# Patient Record
Sex: FEMALE | Race: WHITE | NOT HISPANIC OR LATINO | Employment: FULL TIME | URBAN - METROPOLITAN AREA
[De-identification: names, ages, dates, MRNs, and addresses within clinical notes are randomized per-mention and may not be internally consistent; named-entity substitution may affect disease eponyms.]

---

## 2023-10-09 ENCOUNTER — OFFICE VISIT (OUTPATIENT)
Dept: FAMILY MEDICINE CLINIC | Facility: CLINIC | Age: 53
End: 2023-10-09
Payer: COMMERCIAL

## 2023-10-09 VITALS
HEIGHT: 66 IN | DIASTOLIC BLOOD PRESSURE: 82 MMHG | OXYGEN SATURATION: 98 % | WEIGHT: 136 LBS | BODY MASS INDEX: 21.86 KG/M2 | SYSTOLIC BLOOD PRESSURE: 146 MMHG | RESPIRATION RATE: 14 BRPM | HEART RATE: 78 BPM

## 2023-10-09 DIAGNOSIS — Z00.00 PHYSICAL EXAM, ANNUAL: ICD-10-CM

## 2023-10-09 DIAGNOSIS — F32.A ANXIETY AND DEPRESSION: Chronic | ICD-10-CM

## 2023-10-09 DIAGNOSIS — F17.210 CIGARETTE SMOKER: ICD-10-CM

## 2023-10-09 DIAGNOSIS — G43.809 OTHER MIGRAINE WITHOUT STATUS MIGRAINOSUS, NOT INTRACTABLE: ICD-10-CM

## 2023-10-09 DIAGNOSIS — F44.5 PSYCHOGENIC NONEPILEPTIC SEIZURE: ICD-10-CM

## 2023-10-09 DIAGNOSIS — R07.9 CHEST PAIN, UNSPECIFIED TYPE: ICD-10-CM

## 2023-10-09 DIAGNOSIS — Z12.2 ENCOUNTER FOR SCREENING FOR LUNG CANCER: ICD-10-CM

## 2023-10-09 DIAGNOSIS — Z00.00 MEDICARE ANNUAL WELLNESS VISIT, SUBSEQUENT: Primary | ICD-10-CM

## 2023-10-09 DIAGNOSIS — K06.1 GINGIVAL HYPERPLASIA: ICD-10-CM

## 2023-10-09 DIAGNOSIS — Z72.0 TOBACCO USE: ICD-10-CM

## 2023-10-09 DIAGNOSIS — R41.89 COGNITIVE DECLINE: ICD-10-CM

## 2023-10-09 DIAGNOSIS — I10 ESSENTIAL HYPERTENSION: ICD-10-CM

## 2023-10-09 DIAGNOSIS — R87.810 CERVICAL HIGH RISK HPV (HUMAN PAPILLOMAVIRUS) TEST POSITIVE: ICD-10-CM

## 2023-10-09 DIAGNOSIS — J44.1 CHRONIC OBSTRUCTIVE PULMONARY DISEASE WITH ACUTE EXACERBATION: ICD-10-CM

## 2023-10-09 DIAGNOSIS — F43.10 PTSD (POST-TRAUMATIC STRESS DISORDER): ICD-10-CM

## 2023-10-09 DIAGNOSIS — Z12.31 ENCOUNTER FOR SCREENING MAMMOGRAM FOR MALIGNANT NEOPLASM OF BREAST: ICD-10-CM

## 2023-10-09 DIAGNOSIS — F41.9 ANXIETY AND DEPRESSION: Chronic | ICD-10-CM

## 2023-10-09 RX ORDER — CARVEDILOL 12.5 MG/1
12.5 TABLET ORAL 2 TIMES DAILY WITH MEALS
Qty: 180 TABLET | Refills: 1 | Status: SHIPPED | OUTPATIENT
Start: 2023-10-09

## 2023-10-09 RX ORDER — AMLODIPINE BESYLATE 5 MG/1
5 TABLET ORAL DAILY
Qty: 30 TABLET | Refills: 1 | Status: SHIPPED | OUTPATIENT
Start: 2023-10-09

## 2023-10-09 NOTE — PROGRESS NOTES
The ABCs of the Annual Wellness Visit  Subsequent Medicare Wellness Visit    Subjective    Arcelia Quintero is a 53 y.o. female who presents for a Subsequent Medicare Wellness Visit.    The following portions of the patient's history were reviewed and   updated as appropriate: allergies, current medications, past family history, past medical history, past social history, past surgical history, and problem list.    Compared to one year ago, the patient feels her physical   health is worse.    Compared to one year ago, the patient feels her mental   health is worse.    Recent Hospitalizations:  This patient has had a [facility] admission record on file within the last 365 days.    Current Medical Providers:  Patient Care Team:  Coby Ely PA-C as PCP - General (Physician Assistant)  Carolina Tello MD as Consulting Physician (Neurology)  Elliott Hunt MD as Consulting Physician (Gastroenterology)  Ryder Delarosa III, MD as Cardiologist (Cardiology)    Outpatient Medications Prior to Visit   Medication Sig Dispense Refill    Dulera 200-5 MCG/ACT inhaler       lactulose (CHRONULAC) 10 GM/15ML solution Take 45 mL by mouth 2 (Two) Times a Day.      linaclotide (Linzess) 145 MCG capsule capsule Take 1 capsule by mouth Every Morning Before Breakfast. 90 capsule 3    pantoprazole (PROTONIX) 40 MG EC tablet Take 1 tablet by mouth Daily. 30 tablet 3    polyethylene glycol (MIRALAX) 17 GM/SCOOP powder Take 17 g by mouth Daily. 850 g 2    rizatriptan (Maxalt) 10 MG tablet Take 1 tablet at onset of headache.  May repeat once in 2 hours.  Do not take more than 2 tabs a day or 8 tabs a month 8 tablet 3    Spiriva Respimat 2.5 MCG/ACT aerosol solution inhaler       topiramate (Topamax) 25 MG tablet Take 1 tablet at night for 1 week then one tablet twice a day for 1 week then 1 tablet in the morning and 2 tablets at night for 1 week 42 tablet 0    topiramate (Topamax) 50 MG tablet Take 1 tablet by mouth 2 (Two)  Times a Day. 60 tablet 5    amLODIPine (NORVASC) 10 MG tablet Take 1 tablet by mouth Daily. 90 tablet 1    carvedilol (COREG) 6.25 MG tablet Take 1 tablet by mouth 2 (Two) Times a Day With Meals. 180 tablet 1     No facility-administered medications prior to visit.       No opioid medication identified on active medication list. I have reviewed chart for other potential  high risk medication/s and harmful drug interactions in the elderly.        Aspirin is not on active medication list.  Aspirin use is not indicated based on review of current medical condition/s. Risk of harm outweighs potential benefits.  .    Patient Active Problem List   Diagnosis    Anxiety and depression    Chronic idiopathic constipation    Dyslipidemia    Emphysema/COPD    Primary hypertension    GERD (gastroesophageal reflux disease)    Irritable bowel syndrome    Migraine    H/O Seizures on Keppra    Carpal tunnel syndrome    Internal hemorrhoids    Hemoptysis    Atypical chest pain    Agoraphobia    Genital ulcer, female    Tobacco use    Bilateral leg numbness    Low back pain    History of Guillain[place] syndrome due to influenza immunization    COPD (chronic obstructive pulmonary disease)    Cervical high risk HPV (human papillomavirus) test positive    Abnormal Pap smear of cervix    PTSD (post-traumatic stress disorder)    History of coma    AMS (altered mental status)    Hypertensive emergency    Hypokalemia    History of stroke    CKD (chronic kidney disease), stage III    Former smoker    Psychogenic nonepileptic seizure     Advance Care Planning   Advance Care Planning     Advance Directive is not on file.  ACP discussion was held with the patient during this visit. Patient does not have an advance directive, declines further assistance.     Objective    Vitals:    10/09/23 1502   BP: 146/82   BP Location: Left arm   Patient Position: Sitting   Cuff Size: Adult   Pulse: 78   Resp: 14   SpO2: 98%   Weight: 61.7 kg (136 lb)  "  Height: 167.6 cm (65.98\")   PainSc:   6   PainLoc: Head  Comment: Headache     Estimated body mass index is 21.96 kg/mý as calculated from the following:    Height as of this encounter: 167.6 cm (65.98\").    Weight as of this encounter: 61.7 kg (136 lb).    BMI is within normal parameters. No other follow-up for BMI required.      Does the patient have evidence of cognitive impairment? Yes          HEALTH RISK ASSESSMENT    Smoking Status:  Social History     Tobacco Use   Smoking Status Every Day    Packs/day: 2.00    Years: 41.00    Additional pack years: 0.00    Total pack years: 82.00    Types: Cigarettes    Start date: 1981    Last attempt to quit: 2022    Years since quittin.0    Passive exposure: Current   Smokeless Tobacco Never   Tobacco Comments    quit with chantix in past     Alcohol Consumption:  Social History     Substance and Sexual Activity   Alcohol Use No     Fall Risk Screen:    STEADI Fall Risk Assessment was completed, and patient is at LOW risk for falls.Assessment completed on:10/9/2023    Depression Screening:      10/9/2023     3:05 PM   PHQ-2/PHQ-9 Depression Screening   Little Interest or Pleasure in Doing Things 1-->several days   Feeling Down, Depressed or Hopeless 1-->several days   Trouble Falling or Staying Asleep, or Sleeping Too Much 3-->nearly every day   Feeling Tired or Having Little Energy 3-->nearly every day   Poor Appetite or Overeating 0-->not at all   Feeling Bad about Yourself - or that You are a Failure or Have Let Yourself or Your Family Down 0-->not at all   Trouble Concentrating on Things, Such as Reading the Newspaper or Watching Television 1-->several days   Moving or Speaking So Slowly that Other People Could Have Noticed? Or the Opposite - Being So Fidgety 1-->several days   Thoughts that You Would be Better Off Dead or of Hurting Yourself in Some Way 1-->several days   PHQ-9: Brief Depression Severity Measure Score 11   If You Checked Off Any " Problems, How Difficult Have These Problems Made It For You to Do Your Work, Take Care of Things at Home, or Get Along with Other People? somewhat difficult       Health Habits and Functional and Cognitive Screening:       No data to display                Age-appropriate Screening Schedule:  Refer to the list below for future screening recommendations based on patient's age, sex and/or medical conditions. Orders for these recommended tests are listed in the plan section. The patient has been provided with a written plan.    Health Maintenance   Topic Date Due    MAMMOGRAM  Never done    COVID-19 Vaccine (1) Never done    ZOSTER VACCINE (1 of 2) Never done    TDAP/TD VACCINES (2 - Td or Tdap) 08/03/2021    Pneumococcal Vaccine 0-64 (2 - PCV) 09/01/2021    LUNG CANCER SCREENING  11/15/2022    PAP SMEAR  09/01/2023    LIPID PANEL  07/06/2024    ANNUAL PHYSICAL  10/09/2024    COLORECTAL CANCER SCREENING  09/15/2025    HEPATITIS C SCREENING  Completed                  CMS Preventative Services Quick Reference  Risk Factors Identified During Encounter  Depression/Dysphoria: Referral to Mental Health specialist  Immunizations Discussed/Encouraged: Influenza, Shingrix, and COVID19  Dental Screening Recommended  Vision Screening Recommended  The above risks/problems have been discussed with the patient.  Pertinent information has been shared with the patient in the After Visit Summary.  An After Visit Summary and PPPS were made available to the patient.    Follow Up:   Next Medicare Wellness visit to be scheduled in 1 year.       Additional E&M Note during same encounter follows:  Patient has multiple medical problems which are significant and separately identifiable that require additional work above and beyond the Medicare Wellness Visit.      Chief Complaint  Medicare Wellness-subsequent, Headache, and Annual Exam    Subjective        HPI  Arcelia Quintero is also being seen today for Annual Physical Exam.  Patient has not  "been seen recently.  She has multiple concerns today:  migraines, tobacco abuse, memory/cognitive decline, gingival hyperplasia, chest pain, hypertension.    Review of Systems   Constitutional:  Positive for fatigue. Negative for chills and fever.   HENT:  Positive for mouth sores. Negative for congestion, ear pain, postnasal drip, rhinorrhea, sinus pressure and sore throat.    Eyes:  Negative for visual disturbance.   Respiratory:  Negative for cough, shortness of breath and wheezing.    Cardiovascular:  Positive for chest pain. Negative for palpitations and leg swelling.   Gastrointestinal:  Negative for abdominal pain, blood in stool, diarrhea, nausea and vomiting.   Endocrine: Negative for polyuria.   Genitourinary:  Negative for dysuria, flank pain, frequency and hematuria.   Musculoskeletal:  Negative for arthralgias, gait problem and myalgias.   Skin:  Negative for rash.   Neurological:  Negative for dizziness, seizures and confusion.   Hematological:  Does not bruise/bleed easily.   Psychiatric/Behavioral:  Positive for agitation, dysphoric mood and sleep disturbance. Negative for self-injury and suicidal ideas. The patient is nervous/anxious.        Objective   Vital Signs:  /82 (BP Location: Left arm, Patient Position: Sitting, Cuff Size: Adult)   Pulse 78   Resp 14   Ht 167.6 cm (65.98\")   Wt 61.7 kg (136 lb)   SpO2 98%   BMI 21.96 kg/mý     Physical Exam  Vitals reviewed.   Constitutional:       General: She is not in acute distress.     Appearance: Normal appearance. She is well-developed. She is not ill-appearing or diaphoretic.   HENT:      Head: Normocephalic and atraumatic.      Right Ear: Hearing, tympanic membrane, ear canal and external ear normal.      Left Ear: Hearing, tympanic membrane, ear canal and external ear normal.      Nose: Nose normal.      Right Sinus: No maxillary sinus tenderness or frontal sinus tenderness.      Left Sinus: No maxillary sinus tenderness or frontal " sinus tenderness.      Mouth/Throat:      Dentition: Has dentures. Gingival swelling present.      Pharynx: Uvula midline.      Comments: Not wearing dentures due to gingival hyperplasia.  No lesions appreciated on gums/oral area.  Eyes:      General: Lids are normal.      Extraocular Movements: Extraocular movements intact.      Conjunctiva/sclera: Conjunctivae normal.   Neck:      Thyroid: No thyroid mass or thyromegaly.      Trachea: Trachea and phonation normal.   Cardiovascular:      Rate and Rhythm: Normal rate and regular rhythm.      Heart sounds: Normal heart sounds.   Pulmonary:      Effort: Pulmonary effort is normal.      Breath sounds: Normal breath sounds.   Abdominal:      General: Bowel sounds are normal. There is no distension.      Palpations: Abdomen is soft. Abdomen is not rigid.      Tenderness: There is no abdominal tenderness. There is no guarding.   Musculoskeletal:         General: Normal range of motion.      Cervical back: Normal range of motion.      Right lower leg: No edema.      Left lower leg: No edema.   Lymphadenopathy:      Cervical: No cervical adenopathy.      Right cervical: No superficial cervical adenopathy.     Left cervical: No superficial cervical adenopathy.   Skin:     General: Skin is warm.      Findings: No erythema or rash.      Nails: There is no clubbing.   Neurological:      Mental Status: She is alert and oriented to person, place, and time.      Coordination: Coordination normal.      Gait: Gait normal.      Deep Tendon Reflexes: Reflexes are normal and symmetric.      Comments: CN grossly intact   Psychiatric:         Attention and Perception: Attention and perception normal. She is attentive.         Mood and Affect: Mood is anxious and depressed.         Speech: Speech normal.         Behavior: Behavior normal. Behavior is cooperative.         Thought Content: Thought content normal.         Cognition and Memory: Memory is impaired.         Judgment: Judgment  normal.          Assessment and Plan   Diagnoses and all orders for this visit:    1. Medicare annual wellness visit, subsequent (Primary)    2. Physical exam, annual  PE completed  Preventative labs ordered  Colonoscopy scheduled  Mammogram - referral placed  Gynecologist - referral placed  Dentist - not going, encouraged follow-up  Ophthalmologist - not going, denies issues  Dermatologist - not going, denies issues  Vaccinations discussed - patient declines    3. Cervical high risk HPV (human papillomavirus) test positive  -     Ambulatory Referral to Gynecology    4. Psychogenic nonepileptic seizure  Followed by neurology.  She requests wellbutrin to help her quit smoking.  I declined unless neurology is okay with this.  She will follow-up with neurology.    5. Cognitive decline  Patient states she has cognitive issues.  Recommend discussing with her neurologist.    6. Other migraine without status migrainosus, not intractable  Followed by neurology, Dr. Tello.  She states her headaches have been worse since switching medication.  She will call neurology and follow-up with them regarding this.    7. Essential hypertension  -     carvedilol (COREG) 12.5 MG tablet; Take 1 tablet by mouth 2 (Two) Times a Day With Meals.  Dispense: 180 tablet; Refill: 1  -     amLODIPine (NORVASC) 5 MG tablet; Take 1 tablet by mouth Daily.  Dispense: 30 tablet; Refill: 1  Reduce amlodipine to 5 mg.  Plan to discontinue given gingival hyperplasia.  Increase carvedilol from 6.25 to 12.5 mg twice daily.  Return in 1 month.    8. Chest pain, unspecified type  -     Ambulatory Referral to [facility]  Episodic chest pain.  None today.  Has multiple risk factors.  Will start referral to cardiology.    9. Gingival hyperplasia  Try to reduce amlodipine and discontinue.    10. Anxiety and depression  -     Ambulatory Referral to Behavioral Health    11. PTSD (post-traumatic stress disorder)  -     Ambulatory  Referral to Behavioral Health  Recommend follow-up with behavioral health.  Referral started.  She denies any suicidal thoughts or plans today.    12. Chronic obstructive pulmonary disease with acute exacerbation    13. Tobacco use  -     Discontinue: nicotine (NICOTROL) 10 MG inhaler; Inhale 1 puff As Needed for Smoking Cessation.  Dispense: 168 each; Refill: 1    14. Cigarette smoker  -      CT Chest Low Dose Cancer Screening WO; Future    15. Encounter for screening for lung cancer  -      CT Chest Low Dose Cancer Screening WO; Future    16. Encounter for screening mammogram for malignant neoplasm of breast  -     Mammo Screening Digital Tomosynthesis Bilateral With CAD; Future    Plan of care reviewed with patient at the conclusion of today's visit. Education was provided regarding diet , nutrition , exercise, weight management, supplements, preventative screenings, vaccinations, and mental health diagnosis, management and any prescribed or recommended OTC medications.  Patient verbalizes understanding of and agreement with management plan.          I spent 40 minutes caring for Arcelia on this date of service. This time includes time spent by me in the following activities:preparing for the visit, reviewing tests, obtaining and/or reviewing a separately obtained history, performing a medically appropriate examination and/or evaluation , counseling and educating the patient/family/caregiver, ordering medications, tests, or procedures, referring and communicating with other health care professionals , documenting information in the medical record, independently interpreting results and communicating that information with the patient/family/caregiver, and care coordination  Follow Up   Return in about 4 weeks (around 11/6/2023) for Recheck, HTN.  Patient was given instructions and counseling regarding her condition or for health maintenance advice. Please see specific information pulled into the AVS if appropriate.

## 2023-10-10 ENCOUNTER — TELEPHONE (OUTPATIENT)
Dept: FAMILY MEDICINE CLINIC | Facility: CLINIC | Age: 53
End: 2023-10-10
Payer: MEDICARE

## 2023-10-10 DIAGNOSIS — Z72.0 TOBACCO USE: ICD-10-CM

## 2023-10-10 NOTE — TELEPHONE ENCOUNTER
Caller: [facility] [id number] - [facility] [address] AT [facility] - [phone]  - [phone]     Relationship: Pharmacy    Best call back number: [phone]           What medications are you currently taking:   Current Outpatient Medications on File Prior to Visit   Medication Sig Dispense Refill    amLODIPine (NORVASC) 5 MG tablet Take 1 tablet by mouth Daily. 30 tablet 1    carvedilol (COREG) 12.5 MG tablet Take 1 tablet by mouth 2 (Two) Times a Day With Meals. 180 tablet 1    Dulera 200-5 MCG/ACT inhaler       lactulose (CHRONULAC) 10 GM/15ML solution Take 45 mL by mouth 2 (Two) Times a Day.      linaclotide (Linzess) 145 MCG capsule capsule Take 1 capsule by mouth Every Morning Before Breakfast. 90 capsule 3    nicotine (NICOTROL) 10 MG inhaler Inhale 1 puff As Needed for Smoking Cessation. 168 each 1    pantoprazole (PROTONIX) 40 MG EC tablet Take 1 tablet by mouth Daily. 30 tablet 3    polyethylene glycol (MIRALAX) 17 GM/SCOOP powder Take 17 g by mouth Daily. 850 g 2    rizatriptan (Maxalt) 10 MG tablet Take 1 tablet at onset of headache.  May repeat once in 2 hours.  Do not take more than 2 tabs a day or 8 tabs a month 8 tablet 3    Spiriva Respimat 2.5 MCG/ACT aerosol solution inhaler       topiramate (Topamax) 25 MG tablet Take 1 tablet at night for 1 week then one tablet twice a day for 1 week then 1 tablet in the morning and 2 tablets at night for 1 week 42 tablet 0    topiramate (Topamax) 50 MG tablet Take 1 tablet by mouth 2 (Two) Times a Day. 60 tablet 5     No current facility-administered medications on file prior to visit.          When did you start taking these medications:     Which medication are you concerned about:    nicotine (NICOTROL) 10 MG inhaler       Who prescribed you this medication: NILES POE    What are your concerns: [facility] CALLED AND IS NEEDING CLARIFICATION ON INSTRUCTION OF USE FOR INHALER.     How long have you  had these concerns:

## 2023-10-16 RX ORDER — TOPIRAMATE 25 MG/1
TABLET ORAL
Qty: 42 TABLET | Refills: 3 | Status: SHIPPED | OUTPATIENT
Start: 2023-10-16

## 2023-10-16 NOTE — TELEPHONE ENCOUNTER
Rx Refill Note  Requested Prescriptions     Pending Prescriptions Disp Refills    topiramate (TOPAMAX) 25 MG tablet [Pharmacy Med Name: TOPIRAMATE 25 MG TABLET] 42 tablet 0     Sig: TAKE ONE TABLET BY MOUTH ONCE NIGHTLY FOR 7 DAYS THEN TAKE 1 TABLET BY MOUTH TWICE A DAY FOR 7 DAYS THEN TAKE ONE TABLET BY MOUTH EVERY MORNING AND 2 TABLETS AT NIGHT FOR 7 DAYS      Last filled:09/28/23  Last office visit with prescribing clinician: 9/28/2023      Next office visit with prescribing clinician: 2/1/2024     Yuliya Gorman MA  10/16/23, 13:18 EDT

## 2023-10-18 ENCOUNTER — OFFICE VISIT (OUTPATIENT)
Dept: OBSTETRICS AND GYNECOLOGY | Facility: CLINIC | Age: 53
End: 2023-10-18
Payer: COMMERCIAL

## 2023-10-18 ENCOUNTER — OFFICE VISIT (OUTPATIENT)
Dept: CARDIOLOGY | Facility: HOSPITAL | Age: 53
End: 2023-10-18
Payer: MEDICARE

## 2023-10-18 VITALS
SYSTOLIC BLOOD PRESSURE: 158 MMHG | WEIGHT: 134.2 LBS | DIASTOLIC BLOOD PRESSURE: 90 MMHG | HEIGHT: 66 IN | BODY MASS INDEX: 21.57 KG/M2

## 2023-10-18 VITALS
SYSTOLIC BLOOD PRESSURE: 125 MMHG | DIASTOLIC BLOOD PRESSURE: 72 MMHG | OXYGEN SATURATION: 96 % | HEART RATE: 64 BPM | HEIGHT: 66 IN | RESPIRATION RATE: 16 BRPM | BODY MASS INDEX: 21.5 KG/M2 | WEIGHT: 133.8 LBS | TEMPERATURE: 96.7 F

## 2023-10-18 DIAGNOSIS — R06.09 DYSPNEA ON EXERTION: ICD-10-CM

## 2023-10-18 DIAGNOSIS — E78.5 DYSLIPIDEMIA: ICD-10-CM

## 2023-10-18 DIAGNOSIS — R53.83 OTHER FATIGUE: ICD-10-CM

## 2023-10-18 DIAGNOSIS — Z87.42 HISTORY OF ABNORMAL CERVICAL PAP SMEAR: ICD-10-CM

## 2023-10-18 DIAGNOSIS — Z01.411 ENCOUNTER FOR GYNECOLOGICAL EXAMINATION WITH ABNORMAL FINDING: Primary | ICD-10-CM

## 2023-10-18 DIAGNOSIS — R07.89 OTHER CHEST PAIN: Primary | ICD-10-CM

## 2023-10-18 NOTE — PROGRESS NOTES
Chief Complaint  Chest Pain and Hypertension (/)    Subjective      History of Present Illness {CC  Problem List  Visit  Diagnosis   Encounters  Notes  Medications  Labs  Result Review Imaging  Media :23}     Arcelia Quintero, 53 y.o. female with past medical history significant for anxiety, hyperlipidemia, emphysema/COPD, hypertension, GERD, IBS, seizures, Ingrid Barré syndrome, PTSD, CVA, and CKD stage III, who presents to [facility] for Chest Pain and Hypertension (/)  Patient was evaluated by primary care on 10/9/2023 at which time she had complaints of chest pain.  No EKG was completed at that time.    Since time of evaluation by primary care, she continues to have chest pain occurring at least 4 times a week. Pain is located in center of chest/around left breast. Pain is described as a strain. No exacerbating or relieving factors. Pain is not associated to activity, and appears random. Lasting anywhere from a few minutes to a few hours. She endorses chronic shortness of air which is slightly worse recently. Denies syncope.  She endorses dizziness, mild lower extremity swelling, occasional palpitations noted at rest, and worsening fatigue.  At time of evaluation by primary care, amlodipine was decreased due to mouth sores.  Patient was instructed to increase carvedilol to 12.5 mg twice daily but at time of today's visit, that has not been done.  She does intermittently check blood pressure at home which she states runs 120-130 systolic.  Patient states that she is aware of elevated cholesterol levels.  She states she was taken off her cholesterol medicine approximately 6 years ago and has not been on medication since that time.  Upon further evaluation, atorvastatin was found to be discontinued by primary care on 7/17/2023.  Patient did have concern for liver dysfunction, she has been evaluated by GI.  Unknown why atorvastatin was discontinued.    Of note, patient previously  "followed with Dr. Delarosa with cardiology.    She works as a cook, and is active at work  Caffeine intake of cup of 2 cups of coffee, multiple mountain dews daily  Water intake is inadequate  Nicotine use of 1 PPD   No ETOH      Mother with cardiac problems, data deficient     Stress test 12/10/2021:  Left ventricular ejection fraction is normal. (Calculated EF = 61%).  Prominent apical thinning artifact. No definitive scar or ischemia.  Impressions are consistent with a low risk study.    Echocardiogram 10/31/2019:  Left ventricular systolic function is normal.  Estimated EF appears to be in the range of 66 - 70%.      Objective     Vital Signs:   Vitals:    10/18/23 0808 10/18/23 0809 10/18/23 0810   BP: 140/79 132/84 125/72   BP Location: Right arm Left arm Left arm   Patient Position: Sitting Standing Sitting   Cuff Size: Adult Adult Adult   Pulse: 65 64 64   Resp:   16   Temp: 96.7 °F (35.9 °C) 96.7 °F (35.9 °C) 96.7 °F (35.9 °C)   TempSrc: Temporal Temporal Temporal   SpO2: 97% 96% 96%   Weight:   60.7 kg (133 lb 12.8 oz)   Height:   167.6 cm (66\")     Body mass index is 21.6 kg/m².  Physical Exam  Vitals and nursing note reviewed.   Constitutional:       Appearance: Normal appearance.   HENT:      Head: Normocephalic.   Eyes:      Pupils: Pupils are equal, round, and reactive to light.   Cardiovascular:      Rate and Rhythm: Normal rate and regular rhythm.      Pulses: Normal pulses.      Heart sounds: Normal heart sounds. No murmur heard.  Pulmonary:      Effort: Pulmonary effort is normal.      Breath sounds: Normal breath sounds.   Abdominal:      General: Bowel sounds are normal.      Palpations: Abdomen is soft.   Musculoskeletal:         General: Normal range of motion.      Cervical back: Normal range of motion.      Right lower leg: No edema.      Left lower leg: No edema.   Skin:     General: Skin is warm and dry.      Capillary Refill: Capillary refill takes less than 2 seconds.   Neurological:      " Mental Status: She is alert and oriented to person, place, and time.   Psychiatric:         Mood and Affect: Mood normal.         Thought Content: Thought content normal.                Data Reviewed:{ Labs  Result Review  Imaging  Med Tab  Media :23}     Lab Results   Component Value Date    WBC 4.56 07/06/2023    HGB 13.6 07/06/2023    HCT 40.5 07/06/2023    MCV 90.8 07/06/2023     07/06/2023      Lab Results   Component Value Date    GLUCOSE 95 07/06/2023    BUN 12 07/06/2023    CREATININE 1.00 07/06/2023    EGFR 67.9 07/06/2023    BCR 12.0 07/06/2023    K 3.9 07/06/2023    CO2 27.0 07/06/2023    CALCIUM 9.2 07/06/2023    ALBUMIN 4.3 07/05/2023    BILITOT 0.4 07/05/2023    AST 14 07/05/2023    ALT 10 07/05/2023      Lab Results   Component Value Date    CHOL 163 07/06/2023    CHLPL 139 10/16/2015    TRIG 152 (H) 07/06/2023    HDL 32 (L) 07/06/2023     (H) 07/06/2023      Adult Transthoracic Echo Complete W/ Cont if Necessary Per Protocol (10/31/2019 11:12)  ECG 12 Lead Altered Mental Status (07/05/2023 07:45)  Stress Test With Myocardial Perfusion (1 Day) (12/10/2021 09:56)    Assessment & Plan   Assessment and Plan {CC Problem List  Visit Diagnosis  ROS  Review (Popup)  Health Maintenance  Quality  BestPractice  Medications  SmartSets  SnapShot Encounters  Media :23}     1. Other chest pain  -Chest pain is described as atypical, however this has been ongoing and is very concerning to the patient.  This requires further evaluation  -We will obtain stress test to evaluate for any ischemic causes for patient's symptoms  -The 10-year ASCVD risk score (Franklin BEASLEY, et al., 2019) is: 7.2%    Values used to calculate the score:      Age: 53 years      Sex: Female      Is Non- : No      Diabetic: No      Tobacco smoker: Yes      Systolic Blood Pressure: 125 mmHg      Is BP treated: Yes      HDL Cholesterol: 32 mg/dL      Total Cholesterol: 163 mg/dL   - Stress Test  With Myocardial Perfusion (1 Day); Future  - Adult Transthoracic Echo Complete W/ Cont if Necessary Per Protocol; Future    2. Dyspnea on exertion  -Patient has known COPD, however she states shortness of air has been worsening recently  - Stress Test With Myocardial Perfusion (1 Day); Future  - Adult Transthoracic Echo Complete W/ Cont if Necessary Per Protocol; Future    3. Other fatigue  - Fatigue also worsening recently  - Stress Test With Myocardial Perfusion (1 Day); Future  - Adult Transthoracic Echo Complete W/ Cont if Necessary Per Protocol; Future    4. Dyslipidemia  -Patient noted to have dyslipidemia. Statin was discontinued in July of 2023  - Stress Test With Myocardial Perfusion (1 Day); Future  - Adult Transthoracic Echo Complete W/ Cont if Necessary Per Protocol; Future      Follow Up {Instructions Charge Capture  Follow-up Communications :23}     Return in about 4 weeks (around 11/15/2023) for Chest pain, Result review, Hypertension.    Patient was given instructions and counseling regarding her condition or for health maintenance advice. Please see specific information pulled into the AVS if appropriate.  Patient was instructed to call the [facility] with any questions, concerns, or worsening symptoms.    Dictated Utilizing Dragon Dictation   Please note that portions of this note were completed with a voice recognition program.   Part of this note may be an electronic transcription/translation of spoken language to printed text using the Dragon Dictation System.

## 2023-10-18 NOTE — PROGRESS NOTES
"Chief Complaint  Arcelia Quintero is a 53 y.o.  female presenting for Gynecologic Exam (New GYN, establish care.  Abnormal pap in  with no follow-up.  )    History of Present Illness  Arcelia is a pleasant but quiet 53-year-old woman,  13 para 4, here for GYN exam, and follow-up of abnormal Pap.  She is postmenopausal and uses no hormone replacement therapy.    She had a low-grade squamous intraepithelial lesion on Pap in 2020.  No follow-up.  She is willing to repeat the Pap smear today, and make further plan of care after receiving the Pap results.  She was given reassurance that I do the colposcopies, and can get her back in during the next few weeks if abnormal Pap persists.  She is a smoker, half to 1 pack/day.  She is not currently sexually active for about 1 year.    When she was sexually active, there was no postcoital bleeding.      Her last mammogram was \"years ago\", but she has an appointment.  Her last colonoscopy was 2020; she has an appointment for endoscopy and colonoscopy on .  We will update Pap smear today.      She has a complex medical history;  (See below).    The following portions of the patient's history were reviewed and updated as appropriate: allergies, current medications, past family history, past medical history, past social history, past surgical history, and problem list.    Allergies   Allergen Reactions    Codeine Hives    Influenza Vaccines Other (See Comments)     Got GB syndrome    Propofol Other (See Comments)     Propofol infusion reaction, weakness, tremors, paralysis         Current Outpatient Medications:     amLODIPine (NORVASC) 5 MG tablet, Take 1 tablet by mouth Daily., Disp: 30 tablet, Rfl: 1    carvedilol (COREG) 12.5 MG tablet, Take 1 tablet by mouth 2 (Two) Times a Day With Meals. (Patient taking differently: Take 0.5 tablets by mouth 2 (Two) Times a Day With Meals.), Disp: 180 tablet, Rfl: 1    Dulera 200-5 MCG/ACT inhaler, " Inhale 1 puff 2 (Two) Times a Day., Disp: , Rfl:     lactulose (CHRONULAC) 10 GM/15ML solution, Take 45 mL by mouth 2 (Two) Times a Day., Disp: , Rfl:     linaclotide (Linzess) 145 MCG capsule capsule, Take 1 capsule by mouth Every Morning Before Breakfast., Disp: 90 capsule, Rfl: 3    nicotine (NICOTROL) 10 MG inhaler, Inhale 1 puff Every 2 (Two) Hours. (Patient not taking: Reported on 10/18/2023), Disp: 168 each, Rfl: 1    pantoprazole (PROTONIX) 40 MG EC tablet, Take 1 tablet by mouth Daily., Disp: 30 tablet, Rfl: 3    polyethylene glycol (MIRALAX) 17 GM/SCOOP powder, Take 17 g by mouth Daily. (Patient not taking: Reported on 10/18/2023), Disp: 850 g, Rfl: 2    rizatriptan (Maxalt) 10 MG tablet, Take 1 tablet at onset of headache.  May repeat once in 2 hours.  Do not take more than 2 tabs a day or 8 tabs a month, Disp: 8 tablet, Rfl: 3    Spiriva Respimat 2.5 MCG/ACT aerosol solution inhaler, Inhale 2 puffs Daily., Disp: , Rfl:     topiramate (TOPAMAX) 25 MG tablet, TAKE ONE TABLET BY MOUTH ONCE NIGHTLY FOR 7 DAYS THEN TAKE 1 TABLET BY MOUTH TWICE A DAY FOR 7 DAYS THEN TAKE ONE TABLET BY MOUTH EVERY MORNING AND 2 TABLETS AT NIGHT FOR 7 DAYS, Disp: 42 tablet, Rfl: 3    topiramate (Topamax) 50 MG tablet, Take 1 tablet by mouth 2 (Two) Times a Day. (Patient not taking: Reported on 10/18/2023), Disp: 60 tablet, Rfl: 5    Past Medical History:   Diagnosis Date    Abnormal Pap smear of cervix     Allergic rhinitis     Asthma     Atypical chest pain     Brain tumor     Status post brain tumor resection in 1989.    Candidiasis of mouth     Cervical high risk HPV (human papillomavirus) test positive 09/01/2020    CKD (chronic kidney disease), stage III     Colon polyps 09/09/2020    sessile and tubular adenoma    Conversion disorder     COPD (chronic obstructive pulmonary disease)     COVID-19     Elevated liver enzymes     Former smoker 07/05/2023    H/O Helicobacter infection      Status post EGD 9/4/2012, pathology  "revealed H. pylori infection.    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     Neurology evaluation thought it was numbness from carpal tunnel and not Guillain-Reynolds    Hyperlipidemia     Hypertension     Influenza     Internal hemorrhoids     Low grade squamous intraepith lesion on cytologic smear cervix (lgsil) 09/01/2020    Median neuropathy     Migraine     PTSD (post-traumatic stress disorder)     Seizures     2 seizures after surgery    Small fiber neuropathy     Stroke     Tinea corporis         Past Surgical History:   Procedure Laterality Date    BRAIN SURGERY      status post brain tumor resection    COLONOSCOPY      polyp removal    LIVER BIOPSY      UPPER GASTROINTESTINAL ENDOSCOPY      Status post EGD 9/4/2012, pathology revealed H. pylori infection.       Objective  /90   Ht 167.6 cm (66\")   Wt 60.9 kg (134 lb 3.2 oz)   LMP  (LMP Unknown)   Breastfeeding No   BMI 21.66 kg/m²     Physical Exam  Vitals and nursing note reviewed. Exam conducted with a chaperone present.   Constitutional:       General: She is not in acute distress.     Appearance: Normal appearance. She is not ill-appearing.   HENT:      Head: Normocephalic.   Neck:      Thyroid: No thyroid mass or thyromegaly.   Cardiovascular:      Rate and Rhythm: Normal rate and regular rhythm.      Heart sounds: Normal heart sounds. No murmur heard.  Pulmonary:      Effort: Pulmonary effort is normal. No respiratory distress.      Breath sounds: Normal breath sounds.   Chest:   Breasts:     Right: No inverted nipple, mass or nipple discharge.      Left: No inverted nipple, mass or nipple discharge.   Abdominal:      Palpations: Abdomen is soft. There is no mass.      Tenderness: There is no abdominal tenderness.   Genitourinary:     General: Normal vulva.      Labia:         Right: No rash, tenderness or lesion.         Left: No rash, tenderness or lesion.       Vagina: Normal. No erythema. "      Cervix: No discharge, lesion or erythema.      Uterus: Not enlarged and not tender.       Adnexa:         Right: No mass or tenderness.          Left: No mass or tenderness.        Comments: No leukoplakia or atypical vessels.  No abnormal discharge.  Prominent uterus, but probably wnl for parity (grand multip).  Anus appears wnl.  No rectal exam performed.  Lymphadenopathy:      Upper Body:      Right upper body: No supraclavicular or axillary adenopathy.      Left upper body: No supraclavicular or axillary adenopathy.   Skin:     General: Skin is warm and dry.   Neurological:      Mental Status: She is alert and oriented to person, place, and time.   Psychiatric:         Mood and Affect: Mood normal.         Behavior: Behavior normal.         Assessment/Plan   Diagnoses and all orders for this visit:    1. Encounter for gynecological examination with abnormal finding (Primary)    2. History of abnormal cervical Pap smear  -     LIQUID-BASED PAP SMEAR WITH HPV GENOTYPING IF ASCUS ([facility])    Encouraged to keep all her appts / praised for getting back in to take care of herself.  Will plan to colpo/Bx/ECC right away if abnormal pap persists, or has worsened.    Procedures    40 to 64: Counseling/Anticipatory Guidance Discussed: misuse of tobacco, screenings, and self-breast exam    Return in about 1 year (around 10/18/2024) for Annual physical.    Mary Lr, APRN  10/18/2023

## 2023-10-18 NOTE — TELEPHONE ENCOUNTER
Rx Refill Note  Requested Prescriptions     Pending Prescriptions Disp Refills    Dulera 200-5 MCG/ACT inhaler [Pharmacy Med Name: DULERA 200 MCG-5 MCG INHALER] 13 g      Sig: INHALE TWO PUFFS BY MOUTH TWICE A DAY      Last office visit with prescribing clinician: 2/13/2023   Last telemedicine visit with prescribing clinician: Visit date not found   Next office visit with prescribing clinician: Visit date not found                         Would you like a call back once the refill request has been completed: [] Yes [] No    If the office needs to give you a call back, can they leave a voicemail: [] Yes [] No    Laury Bertrand MA  10/18/23, 08:01 EDT

## 2023-10-20 ENCOUNTER — OFFICE VISIT (OUTPATIENT)
Age: 53
End: 2023-10-20
Payer: MEDICARE

## 2023-10-20 VITALS
WEIGHT: 137.8 LBS | DIASTOLIC BLOOD PRESSURE: 84 MMHG | SYSTOLIC BLOOD PRESSURE: 130 MMHG | HEART RATE: 71 BPM | HEIGHT: 66 IN | OXYGEN SATURATION: 97 % | BODY MASS INDEX: 22.14 KG/M2

## 2023-10-20 DIAGNOSIS — F51.05 INSOMNIA DUE TO OTHER MENTAL DISORDER: ICD-10-CM

## 2023-10-20 DIAGNOSIS — Z79.899 MEDICATION MANAGEMENT: ICD-10-CM

## 2023-10-20 DIAGNOSIS — F99 INSOMNIA DUE TO OTHER MENTAL DISORDER: ICD-10-CM

## 2023-10-20 DIAGNOSIS — F33.2 SEVERE EPISODE OF RECURRENT MAJOR DEPRESSIVE DISORDER, WITHOUT PSYCHOTIC FEATURES: Primary | ICD-10-CM

## 2023-10-20 RX ORDER — MIRTAZAPINE 7.5 MG/1
7.5 TABLET, FILM COATED ORAL NIGHTLY
Qty: 30 TABLET | Refills: 1 | Status: SHIPPED | OUTPATIENT
Start: 2023-10-20

## 2023-10-20 RX ORDER — FLUOXETINE HYDROCHLORIDE 20 MG/1
20 CAPSULE ORAL DAILY
COMMUNITY
Start: 2023-10-18
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
## 2023-10-23 RX ORDER — MOMETASONE FUROATE AND FORMOTEROL FUMARATE DIHYDRATE 200; 5 UG/1; UG/1
2 AEROSOL RESPIRATORY (INHALATION) 2 TIMES DAILY
Qty: 18 G | Refills: 2 | Status: SHIPPED | OUTPATIENT
Start: 2023-10-23 | End: 2024-01-21

## 2023-10-24 LAB
1OH-MIDAZOLAM UR QL SCN: NOT DETECTED NG/MG CREAT
6MAM UR QL SCN: NEGATIVE NG/ML
7AMINOCLONAZEPAM/CREAT UR: NOT DETECTED NG/MG CREAT
A-OH ALPRAZ/CREAT UR: NOT DETECTED NG/MG CREAT
A-OH-TRIAZOLAM/CREAT UR CFM: NOT DETECTED NG/MG CREAT
ACP UR QL CFM: NOT DETECTED
ALPRAZ/CREAT UR CFM: NOT DETECTED NG/MG CREAT
AMPHETAMINES UR QL SCN: NEGATIVE NG/ML
APAP UR QL SCN: NORMAL UG/ML
APAP UR QL: NORMAL
APAP UR-MCNC: PRESENT UG/ML
BARBITURATES UR QL SCN: NEGATIVE NG/ML
BENZODIAZ SCN METH UR: NEGATIVE
BUPRENORPHINE UR QL SCN: NEGATIVE
BUPRENORPHINE/CREAT UR: NOT DETECTED NG/MG CREAT
CANNABINOIDS UR QL SCN: NEGATIVE NG/ML
CARISOPRODOL UR QL: NEGATIVE NG/ML
CLONAZEPAM/CREAT UR CFM: NOT DETECTED NG/MG CREAT
COCAINE+BZE UR QL SCN: NEGATIVE NG/ML
CREAT UR-MCNC: 198 MG/DL
D-METHORPHAN UR-MCNC: NOT DETECTED NG/ML
D-METHORPHAN+LEVORPHANOL UR QL: NOT DETECTED
DESALKYLFLURAZ/CREAT UR: NOT DETECTED NG/MG CREAT
DIAZEPAM/CREAT UR: NOT DETECTED NG/MG CREAT
ETHANOL UR QL SCN: NEGATIVE G/DL
ETHANOL UR QL SCN: NEGATIVE NG/ML
FENTANYL CTO UR SCN-MCNC: NEGATIVE NG/ML
FENTANYL/CREAT UR: NOT DETECTED NG/MG CREAT
FLUNITRAZEPAM UR QL SCN: NOT DETECTED NG/MG CREAT
GABAPENTIN UR-MCNC: NEGATIVE UG/ML
HYPNOTICS UR QL SCN: NEGATIVE
KETAMINE UR QL: NOT DETECTED
LORAZEPAM/CREAT UR: NOT DETECTED NG/MG CREAT
MEPERIDINE UR QL SCN: NEGATIVE NG/ML
METHADONE UR QL SCN: NEGATIVE NG/ML
METHADONE+METAB UR QL SCN: NEGATIVE NG/ML
MIDAZOLAM/CREAT UR CFM: NOT DETECTED NG/MG CREAT
MISCELLANEOUS, UR: NEGATIVE
NORBUPRENORPHINE/CREAT UR: NOT DETECTED NG/MG CREAT
NORDIAZEPAM/CREAT UR: NOT DETECTED NG/MG CREAT
NORFENTANYL/CREAT UR: NOT DETECTED NG/MG CREAT
NORFLUNITRAZEPAM UR-MCNC: NOT DETECTED NG/MG CREAT
NORKETAMINE UR-MCNC: NOT DETECTED UG/ML
OPIATES UR SCN-MCNC: NEGATIVE NG/ML
OTHER HALLUCINOGENS UR: NEGATIVE
OXAZEPAM/CREAT UR: NOT DETECTED NG/MG CREAT
OXYCODONE CTO UR SCN-MCNC: NEGATIVE NG/ML
PCP UR QL SCN: NEGATIVE NG/ML
PRESCRIBED MEDICATIONS: NORMAL
PRESCRIBED MEDICATIONS: NORMAL
PROPOXYPH UR QL SCN: NEGATIVE NG/ML
TAPENTADOL CTO UR SCN-MCNC: NEGATIVE NG/ML
TEMAZEPAM/CREAT UR: NOT DETECTED NG/MG CREAT
TRAMADOL UR QL SCN: NEGATIVE NG/ML
ZALEPLON UR-MCNC: NOT DETECTED NG/ML
ZOLPIDEM PHENYL-4-CARB UR QL SCN: NOT DETECTED
ZOLPIDEM UR QL SCN: NOT DETECTED
ZOPICLONE-N-OXIDE UR-MCNC: NOT DETECTED NG/ML

## 2023-10-26 LAB — REF LAB TEST METHOD: NORMAL

## 2023-11-02 ENCOUNTER — OFFICE VISIT (OUTPATIENT)
Dept: OBSTETRICS AND GYNECOLOGY | Facility: CLINIC | Age: 53
End: 2023-11-02
Payer: MEDICARE

## 2023-11-02 VITALS
RESPIRATION RATE: 16 BRPM | BODY MASS INDEX: 21.64 KG/M2 | DIASTOLIC BLOOD PRESSURE: 82 MMHG | SYSTOLIC BLOOD PRESSURE: 140 MMHG | WEIGHT: 134 LBS

## 2023-11-02 DIAGNOSIS — R87.610 ASCUS WITH POSITIVE HIGH RISK HPV CERVICAL: Primary | ICD-10-CM

## 2023-11-02 DIAGNOSIS — R87.810 ASCUS WITH POSITIVE HIGH RISK HPV CERVICAL: Primary | ICD-10-CM

## 2023-11-03 LAB — REF LAB TEST METHOD: NORMAL

## 2023-11-06 ENCOUNTER — OFFICE VISIT (OUTPATIENT)
Dept: FAMILY MEDICINE CLINIC | Facility: CLINIC | Age: 53
End: 2023-11-06
Payer: MEDICARE

## 2023-11-06 VITALS
SYSTOLIC BLOOD PRESSURE: 142 MMHG | BODY MASS INDEX: 21.83 KG/M2 | HEART RATE: 77 BPM | DIASTOLIC BLOOD PRESSURE: 82 MMHG | HEIGHT: 66 IN | OXYGEN SATURATION: 96 % | WEIGHT: 135.8 LBS

## 2023-11-06 DIAGNOSIS — K06.1 GINGIVAL HYPERPLASIA: ICD-10-CM

## 2023-11-06 DIAGNOSIS — J44.1 CHRONIC OBSTRUCTIVE PULMONARY DISEASE WITH ACUTE EXACERBATION: ICD-10-CM

## 2023-11-06 DIAGNOSIS — I10 ESSENTIAL HYPERTENSION: Primary | ICD-10-CM

## 2023-11-06 PROCEDURE — 1159F MED LIST DOCD IN RCRD: CPT | Performed by: PHYSICIAN ASSISTANT

## 2023-11-06 PROCEDURE — 99214 OFFICE O/P EST MOD 30 MIN: CPT | Performed by: PHYSICIAN ASSISTANT

## 2023-11-06 PROCEDURE — 3079F DIAST BP 80-89 MM HG: CPT | Performed by: PHYSICIAN ASSISTANT

## 2023-11-06 PROCEDURE — 1160F RVW MEDS BY RX/DR IN RCRD: CPT | Performed by: PHYSICIAN ASSISTANT

## 2023-11-06 PROCEDURE — 3077F SYST BP >= 140 MM HG: CPT | Performed by: PHYSICIAN ASSISTANT

## 2023-11-06 RX ORDER — AMLODIPINE BESYLATE 5 MG/1
5 TABLET ORAL DAILY
Qty: 90 TABLET | Refills: 1 | Status: SHIPPED | OUTPATIENT
Start: 2023-11-06

## 2023-11-06 NOTE — PROGRESS NOTES
Chief Complaint   Patient presents with    Hypertension       Arcelia Quintero is a pleasant 53 y.o. female who is here for routine follow-up of gingival hyperplasia, hypertension and COPD.  Patient is compliant on all medications.  Blood pressure is borderline today.  She states it has been normal when at other appointments.  No symptoms of hypertension.  Her gums are feeling better.  She has chronic dry mouth and using mouth wash.  She is using Breztri and feels this is working better than other inhalers.    Past Medical History:   Diagnosis Date    Abnormal Pap smear of cervix     Allergic rhinitis     Asthma     Atypical chest pain     Brain tumor     Status post brain tumor resection in 1989.    Candidiasis of mouth     Cervical high risk HPV (human papillomavirus) test positive 09/01/2020    CKD (chronic kidney disease), stage III     Colon polyps 09/09/2020    sessile and tubular adenoma    Conversion disorder     COPD (chronic obstructive pulmonary disease)     COVID-19     Elevated liver enzymes     Former smoker 07/05/2023    H/O Helicobacter infection      Status post EGD 9/4/2012, pathology revealed H. pylori infection.    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     Neurology evaluation thought it was numbness from carpal tunnel and not Guillain-Cataldo    Hyperlipidemia     Hypertension     Influenza     Internal hemorrhoids     Low grade squamous intraepith lesion on cytologic smear cervix (lgsil) 09/01/2020    Median neuropathy     Migraine     PTSD (post-traumatic stress disorder)     Seizures     2 seizures after surgery    Small fiber neuropathy     Stroke     Tinea corporis        Past Surgical History:   Procedure Laterality Date    BRAIN SURGERY      status post brain tumor resection    COLONOSCOPY      polyp removal    LIVER BIOPSY      UPPER GASTROINTESTINAL ENDOSCOPY      Status post EGD 9/4/2012, pathology revealed H. pylori infection.  "      Family History   Adopted: Yes   Problem Relation Age of Onset    Lung disease Mother     Heart disease Mother     Stroke Mother     Throat cancer Mother     Uterine cancer Mother     Ovarian cancer Mother     Prostate cancer Father     No Known Problems Sister     No Known Problems Brother     Breast cancer Other     Diabetes Other     Colon cancer Other        Social History     Socioeconomic History    Marital status: Legally    Tobacco Use    Smoking status: Every Day     Packs/day: 0.50     Years: 41.00     Additional pack years: 0.00     Total pack years: 20.50     Types: Cigarettes     Start date: 1981     Last attempt to quit: 2022     Years since quittin.1     Passive exposure: Current    Smokeless tobacco: Never    Tobacco comments:     quit with chantix in past   Vaping Use    Vaping Use: Never used   Substance and Sexual Activity    Alcohol use: No    Drug use: No    Sexual activity: Yes     Partners: Male     Birth control/protection: Post-menopausal       Allergies   Allergen Reactions    Codeine Hives    Influenza Vaccines Other (See Comments)     Got GB syndrome    Propofol Other (See Comments)     Propofol infusion reaction, weakness, tremors, paralysis       ROS  Review of Systems   Constitutional:  Negative for chills and fever.   HENT:  Negative for mouth sores.    Respiratory:  Negative for cough, shortness of breath and wheezing.    Cardiovascular:  Negative for chest pain, palpitations and leg swelling.   Neurological:  Negative for dizziness and headache.       Vitals:    23 1308   BP: 142/82   Pulse: 77   SpO2: 96%   Weight: 61.6 kg (135 lb 12.8 oz)   Height: 167.6 cm (65.98\")     Body mass index is 21.93 kg/m².    Current Outpatient Medications on File Prior to Visit   Medication Sig Dispense Refill    carvedilol (COREG) 12.5 MG tablet Take 1 tablet by mouth 2 (Two) Times a Day With Meals. (Patient taking differently: Take 0.5 tablets by mouth 2 (Two) Times " a Day With Meals.) 180 tablet 1    FLUoxetine (PROzac) 20 MG capsule Take 1 capsule by mouth Daily.      lactulose (CHRONULAC) 10 GM/15ML solution Take 45 mL by mouth 2 (Two) Times a Day.      linaclotide (Linzess) 145 MCG capsule capsule Take 1 capsule by mouth Every Morning Before Breakfast. 90 capsule 3    mirtazapine (REMERON) 7.5 MG tablet Take 1 tablet by mouth Every Night. 30 tablet 1    pantoprazole (PROTONIX) 40 MG EC tablet Take 1 tablet by mouth Daily. 30 tablet 3    rizatriptan (Maxalt) 10 MG tablet Take 1 tablet at onset of headache.  May repeat once in 2 hours.  Do not take more than 2 tabs a day or 8 tabs a month 8 tablet 3    topiramate (TOPAMAX) 25 MG tablet TAKE ONE TABLET BY MOUTH ONCE NIGHTLY FOR 7 DAYS THEN TAKE 1 TABLET BY MOUTH TWICE A DAY FOR 7 DAYS THEN TAKE ONE TABLET BY MOUTH EVERY MORNING AND 2 TABLETS AT NIGHT FOR 7 DAYS 42 tablet 3    [DISCONTINUED] amLODIPine (NORVASC) 5 MG tablet Take 1 tablet by mouth Daily. 30 tablet 1    [DISCONTINUED] Dulera 200-5 MCG/ACT inhaler INHALE TWO PUFFS BY MOUTH TWICE A DAY 18 g 2    [DISCONTINUED] Spiriva Respimat 2.5 MCG/ACT aerosol solution inhaler Inhale 2 puffs Daily.      [DISCONTINUED] nicotine (NICOTROL) 10 MG inhaler Inhale 1 puff Every 2 (Two) Hours. (Patient not taking: Reported on 2023) 168 each 1     No current facility-administered medications on file prior to visit.       Results for orders placed or performed in visit on 23   TISSUE EXAM, [facility] ([facility])    Specimen: Cervix; Tissue   Result Value Ref Range    Reference Lab Report       [facility]  [address] [place] [address]  Phone: [phone] or [phone]  Fax: [phone]  Christopher Mills M.D., Medical Director    PATIENT NAME                           LABORATORY NO.  SOTERO LEA                          [id number]  [id number]                         AGE              SEX  SSN           CLIENT REF #  [facility]     53      1970      [id number]   [id number]    [address] [facility] [address]   REQUESTING M.D.     ATTENDING M.D.     COPY TO.  [address] [place]  DATE COLLECTED      DATE RECEIVED      DATE REPORTED  11/02/2023 11/02/2023 11/03/2023    DIAGNOSIS:  A.   ENDOCERVIX, CURETTINGS:  Extremely scant superficial fragments of squamous mucosa with focal  features suggestive of mild  HPV cytopathic effect  Negative for high-grade dysplasia or carcinoma    B.   CERVIX, BIOPSY, 12:00:  Squamous mucosa with focal reactive changes  Negative  for dysplasia or carcinoma    C.   CERVIX, BIOPSY, 5:00:  Squamous mucosa with reactive changes  Negative for dysplasia or carcinoma      CLINICAL HISTORY:  Atypical squamous cells of undetermined significance with positive high risk  HPV cervical    SPECIMENS RECEIVED:  A.  ENDOCERVIX, CURETTINGS  B.  CERVIX, BIOPSY , 12:00  C.  CERVIX, BIOPSY , 5:00    MICROSCOPIC DESCRIPTION:  Tissue blocks are prepared and slides are examined microscopically on all  specimens. See diagnosis for details.    Professional interpretation rendered by Juan Alberto Munguia M.D.,F.C.A.P. at [employer] [employer], [address] [place] [address].    GROSS DESCRIPTION:  A.  Labeled \"ECC\".  Consists of a small amount of tan soft tissue measuring  0.1 x 0.1 x 0.1 cm in aggregate and is filtered and submitted entirely in 1  block.  The specimen may not survive processing.  MARCELO  B.  Labeled \"cervix 12:00\".  Consists of 1 piece of tan soft tissue measuring  0.2 x 0.2 x 0.1 cm and is submitted entirely in 1 block.  C.   Labeled \"5:00\".  Consists of 1 piece of tan soft tissue measuring 0.3 x 0.2 x  0.2 cm and is submitted entirely in 1 block.    REVIEWED, DIAGNOSED AND ELECTRONICALLY  SIGNED BY:    Juan Alberto Munguia M.D.,F.C.A.P.  CPT CODES:  88305x3         PE    Physical Exam  Vitals reviewed.   Constitutional:       General: She is not in acute distress.   "   Appearance: Normal appearance. She is well-developed and normal weight. She is not ill-appearing or diaphoretic.   HENT:      Head: Normocephalic and atraumatic.   Eyes:      Extraocular Movements: Extraocular movements intact.      Conjunctiva/sclera: Conjunctivae normal.   Pulmonary:      Effort: No respiratory distress.   Musculoskeletal:         General: Normal range of motion.      Cervical back: Normal range of motion.   Neurological:      General: No focal deficit present.      Mental Status: She is alert.   Psychiatric:         Attention and Perception: She is attentive.         Mood and Affect: Mood normal.         Speech: Speech normal.         Behavior: Behavior normal. Behavior is cooperative.         Thought Content: Thought content normal.         Judgment: Judgment normal.         BMI is within normal parameters. No other follow-up for BMI required.      A/P    Diagnoses and all orders for this visit:    1. Essential hypertension (Primary)  -     amLODIPine (NORVASC) 5 MG tablet; Take 1 tablet by mouth Daily.  Dispense: 90 tablet; Refill: 1  Borderline today.  Tolerating medication changes.  No symptoms of hypertension.  States BP has been good at other appointment.    2. Chronic obstructive pulmonary disease with acute exacerbation  -     Budeson-Glycopyrrol-Formoterol (BREZTRI) 160-9-4.8 MCG/ACT aerosol inhaler; Inhale 2 puffs 2 (Two) Times a Day.  Dispense: 10.7 g; Refill: 11  Breztri is working well.  Continues to smoke.  Aware she needs to quit.    3. Gingival hyperplasia  Improving with reduction in CCB.       Plan of care reviewed with patient at the conclusion of today's visit. Education was provided regarding diagnosis, management and any prescribed or recommended OTC medications.  Patient verbalizes understanding of and agreement with management plan.    Dictated Utilizing Dragon Dictation     Please note that portions of this note were completed with a voice recognition program.     Part  of this note may be an electronic transcription/translation of spoken language to printed text using the Dragon Dictation System.    Return in about 5 months (around 4/6/2024) for Recheck, month follup.     Coby Ely PA-C

## 2023-11-09 ENCOUNTER — HOSPITAL ENCOUNTER (OUTPATIENT)
Dept: CARDIOLOGY | Facility: HOSPITAL | Age: 53
Discharge: HOME | End: 2023-11-09
Payer: MEDICARE

## 2023-11-15 RX ORDER — SODIUM, POTASSIUM,MAG SULFATES 17.5-3.13G
1 SOLUTION, RECONSTITUTED, ORAL ORAL EVERY 12 HOURS
Qty: 354 ML | Refills: 0 | Status: SHIPPED | OUTPATIENT
Start: 2023-11-15

## 2023-11-17 DIAGNOSIS — K21.9 GASTROESOPHAGEAL REFLUX DISEASE, UNSPECIFIED WHETHER ESOPHAGITIS PRESENT: ICD-10-CM

## 2023-11-17 RX ORDER — PANTOPRAZOLE SODIUM 40 MG/1
40 TABLET, DELAYED RELEASE ORAL DAILY
Qty: 90 TABLET | Refills: 1 | Status: SHIPPED | OUTPATIENT
Start: 2023-11-17

## 2023-11-17 NOTE — TELEPHONE ENCOUNTER
Rx Refill Note  Requested Prescriptions     Pending Prescriptions Disp Refills    pantoprazole (PROTONIX) 40 MG EC tablet [Pharmacy Med Name: PANTOPRAZOLE SOD DR 40 MG TAB] 30 tablet 3     Sig: TAKE 1 TABLET BY MOUTH DAILY      Last office visit with prescribing clinician: 11/6/2023     Next office visit with prescribing clinician: 4/8/2024   Melissa Gee MA  11/17/23, 10:18 EST

## 2023-11-20 ENCOUNTER — OFFICE VISIT (OUTPATIENT)
Age: 53
End: 2023-11-20
Payer: MEDICARE

## 2023-11-20 VITALS
SYSTOLIC BLOOD PRESSURE: 154 MMHG | HEIGHT: 66 IN | DIASTOLIC BLOOD PRESSURE: 92 MMHG | WEIGHT: 140.7 LBS | HEART RATE: 75 BPM | BODY MASS INDEX: 22.61 KG/M2 | OXYGEN SATURATION: 95 %

## 2023-11-20 DIAGNOSIS — F51.05 INSOMNIA DUE TO OTHER MENTAL DISORDER: ICD-10-CM

## 2023-11-20 DIAGNOSIS — F33.2 SEVERE EPISODE OF RECURRENT MAJOR DEPRESSIVE DISORDER, WITHOUT PSYCHOTIC FEATURES: Primary | ICD-10-CM

## 2023-11-20 DIAGNOSIS — F99 INSOMNIA DUE TO OTHER MENTAL DISORDER: ICD-10-CM

## 2023-11-20 PROCEDURE — 1159F MED LIST DOCD IN RCRD: CPT | Performed by: NURSE PRACTITIONER

## 2023-11-20 PROCEDURE — 99214 OFFICE O/P EST MOD 30 MIN: CPT | Performed by: NURSE PRACTITIONER

## 2023-11-20 PROCEDURE — 3080F DIAST BP >= 90 MM HG: CPT | Performed by: NURSE PRACTITIONER

## 2023-11-20 PROCEDURE — 3077F SYST BP >= 140 MM HG: CPT | Performed by: NURSE PRACTITIONER

## 2023-11-20 PROCEDURE — 1160F RVW MEDS BY RX/DR IN RCRD: CPT | Performed by: NURSE PRACTITIONER

## 2023-11-20 RX ORDER — MIRTAZAPINE 7.5 MG/1
15 TABLET, FILM COATED ORAL NIGHTLY
Qty: 30 TABLET | Refills: 0 | Status: SHIPPED | OUTPATIENT
Start: 2023-11-20

## 2023-11-20 RX ORDER — FLUOXETINE HYDROCHLORIDE 20 MG/1
20 CAPSULE ORAL DAILY
Qty: 30 CAPSULE | Refills: 0 | Status: SHIPPED | OUTPATIENT
Start: 2023-11-20
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
## 2023-11-21 ENCOUNTER — HOSPITAL ENCOUNTER (OUTPATIENT)
Dept: CARDIOLOGY | Facility: HOSPITAL | Age: 53
Discharge: HOME | End: 2023-11-21
Admitting: NURSE PRACTITIONER
Payer: MEDICARE

## 2023-11-21 VITALS — BODY MASS INDEX: 22.6 KG/M2 | WEIGHT: 140.65 LBS | HEIGHT: 66 IN

## 2023-11-21 DIAGNOSIS — R07.89 OTHER CHEST PAIN: ICD-10-CM

## 2023-11-21 DIAGNOSIS — R06.09 DYSPNEA ON EXERTION: ICD-10-CM

## 2023-11-21 DIAGNOSIS — E78.5 DYSLIPIDEMIA: ICD-10-CM

## 2023-11-21 DIAGNOSIS — R53.83 OTHER FATIGUE: ICD-10-CM

## 2023-11-21 PROCEDURE — 93306 TTE W/DOPPLER COMPLETE: CPT

## 2023-11-22 LAB
ASCENDING AORTA: 2.8 CM
BH CV ECHO MEAS - AO MAX PG: 5.4 MMHG
BH CV ECHO MEAS - AO MEAN PG: 2.9 MMHG
BH CV ECHO MEAS - AO ROOT DIAM: 3.3 CM
BH CV ECHO MEAS - AO V2 MAX: 116.1 CM/SEC
BH CV ECHO MEAS - AO V2 VTI: 24.6 CM
BH CV ECHO MEAS - AVA(I,D): 2.34 CM2
BH CV ECHO MEAS - EDV(CUBED): 108.5 ML
BH CV ECHO MEAS - EDV(MOD-SP2): 81 ML
BH CV ECHO MEAS - EDV(MOD-SP4): 74 ML
BH CV ECHO MEAS - EF(MOD-BP): 61 %
BH CV ECHO MEAS - EF(MOD-SP2): 61.7 %
BH CV ECHO MEAS - EF(MOD-SP4): 60.8 %
BH CV ECHO MEAS - ESV(CUBED): 33.2 ML
BH CV ECHO MEAS - ESV(MOD-SP2): 31 ML
BH CV ECHO MEAS - ESV(MOD-SP4): 29 ML
BH CV ECHO MEAS - FS: 32.6 %
BH CV ECHO MEAS - IVS/LVPW: 0.99 CM
BH CV ECHO MEAS - IVSD: 0.82 CM
BH CV ECHO MEAS - LA DIMENSION: 3 CM
BH CV ECHO MEAS - LAT PEAK E' VEL: 10.3 CM/SEC
BH CV ECHO MEAS - LV DIASTOLIC VOL/BSA (35-75): 43.1 CM2
BH CV ECHO MEAS - LV MASS(C)D: 130.3 GRAMS
BH CV ECHO MEAS - LV MAX PG: 3.3 MMHG
BH CV ECHO MEAS - LV MEAN PG: 1.54 MMHG
BH CV ECHO MEAS - LV SYSTOLIC VOL/BSA (12-30): 16.9 CM2
BH CV ECHO MEAS - LV V1 MAX: 91.4 CM/SEC
BH CV ECHO MEAS - LV V1 VTI: 18.4 CM
BH CV ECHO MEAS - LVIDD: 4.8 CM
BH CV ECHO MEAS - LVIDS: 3.2 CM
BH CV ECHO MEAS - LVOT AREA: 3.1 CM2
BH CV ECHO MEAS - LVOT DIAM: 2 CM
BH CV ECHO MEAS - LVPWD: 0.83 CM
BH CV ECHO MEAS - MED PEAK E' VEL: 8.6 CM/SEC
BH CV ECHO MEAS - MV A MAX VEL: 84.5 CM/SEC
BH CV ECHO MEAS - MV DEC TIME: 0.26 SEC
BH CV ECHO MEAS - MV E MAX VEL: 69.1 CM/SEC
BH CV ECHO MEAS - MV E/A: 0.82
BH CV ECHO MEAS - MV MAX PG: 3.6 MMHG
BH CV ECHO MEAS - MV MEAN PG: 1.62 MMHG
BH CV ECHO MEAS - MV V2 VTI: 26.6 CM
BH CV ECHO MEAS - MVA(VTI): 2.17 CM2
BH CV ECHO MEAS - PA ACC SLOPE: 545.3 CM/SEC2
BH CV ECHO MEAS - PA ACC TIME: 0.17 SEC
BH CV ECHO MEAS - PA V2 MAX: 98.2 CM/SEC
BH CV ECHO MEAS - SI(MOD-SP2): 29.1 ML/M2
BH CV ECHO MEAS - SI(MOD-SP4): 26.2 ML/M2
BH CV ECHO MEAS - SV(LVOT): 57.8 ML
BH CV ECHO MEAS - SV(MOD-SP2): 50 ML
BH CV ECHO MEAS - SV(MOD-SP4): 45 ML
BH CV ECHO MEAS - TAPSE (>1.6): 2.7 CM
BH CV ECHO MEASUREMENTS AVERAGE E/E' RATIO: 7.31
BH CV VAS BP LEFT ARM: NORMAL MMHG
BH CV XLRA - RV BASE: 2.6 CM
BH CV XLRA - RV LENGTH: 7.8 CM
BH CV XLRA - RV MID: 2 CM
BH CV XLRA - TDI S': 11.8 CM/SEC
IVRT: 109 MS
LEFT ATRIUM VOLUME INDEX: 19.2 ML/M2

## 2023-11-22 NOTE — PROGRESS NOTES
Your echocardiogram is normal.  Your heart contracts normally.  There are no significant valvular abnormalities noted.    If you have any questions regarding this, we can discuss in further detail at our next follow-up appointment.    Sincerely yours,        Karol NORIEGA

## 2023-11-27 ENCOUNTER — TELEPHONE (OUTPATIENT)
Dept: GASTROENTEROLOGY | Facility: CLINIC | Age: 53
End: 2023-11-27
Payer: MEDICARE

## 2023-11-27 NOTE — TELEPHONE ENCOUNTER
Hub staff attempted to follow warm transfer process and was unsuccessful     Caller: Arcelia Quintero    Relationship to patient: Self    Best call back number: [phone]    Patient is needing: PATIENT CALLED IN AND STATED THAT SHE WAS RETURNING A MISSED CALL. PATIENT STATED OKAY TO CALL BACK ANYTIME, BUT SHE CAN NOT GET INTO HER VOICEMAIL TO RETRIEVE MESSAGES. PLEASE CALL BACK.

## 2023-11-29 ENCOUNTER — OUTSIDE FACILITY SERVICE (OUTPATIENT)
Dept: GASTROENTEROLOGY | Facility: CLINIC | Age: 53
End: 2023-11-29
Payer: MEDICARE

## 2023-11-29 PROCEDURE — 43239 EGD BIOPSY SINGLE/MULTIPLE: CPT | Performed by: INTERNAL MEDICINE

## 2023-11-29 PROCEDURE — 88305 TISSUE EXAM BY PATHOLOGIST: CPT

## 2023-11-29 PROCEDURE — 45385 COLONOSCOPY W/LESION REMOVAL: CPT | Performed by: INTERNAL MEDICINE

## 2023-11-30 ENCOUNTER — LAB REQUISITION (OUTPATIENT)
Dept: LAB | Facility: HOSPITAL | Age: 53
End: 2023-11-30
Payer: MEDICARE

## 2023-11-30 DIAGNOSIS — R10.13 EPIGASTRIC PAIN: ICD-10-CM

## 2023-11-30 DIAGNOSIS — K21.9 GASTRO-ESOPHAGEAL REFLUX DISEASE WITHOUT ESOPHAGITIS: ICD-10-CM

## 2023-11-30 DIAGNOSIS — K44.9 DIAPHRAGMATIC HERNIA WITHOUT OBSTRUCTION OR GANGRENE: ICD-10-CM

## 2023-11-30 DIAGNOSIS — K59.00 CONSTIPATION, UNSPECIFIED: ICD-10-CM

## 2023-11-30 DIAGNOSIS — D12.4 BENIGN NEOPLASM OF DESCENDING COLON: ICD-10-CM

## 2023-11-30 DIAGNOSIS — K58.9 IRRITABLE BOWEL SYNDROME WITHOUT DIARRHEA: ICD-10-CM

## 2023-11-30 DIAGNOSIS — K29.70 GASTRITIS, UNSPECIFIED, WITHOUT BLEEDING: ICD-10-CM

## 2023-11-30 DIAGNOSIS — Z86.010 PERSONAL HISTORY OF COLONIC POLYPS: ICD-10-CM

## 2023-12-01 LAB — REF LAB TEST METHOD: NORMAL

## 2023-12-02 DIAGNOSIS — F99 INSOMNIA DUE TO OTHER MENTAL DISORDER: ICD-10-CM

## 2023-12-02 DIAGNOSIS — F51.05 INSOMNIA DUE TO OTHER MENTAL DISORDER: ICD-10-CM

## 2023-12-02 DIAGNOSIS — F33.2 SEVERE EPISODE OF RECURRENT MAJOR DEPRESSIVE DISORDER, WITHOUT PSYCHOTIC FEATURES: ICD-10-CM

## 2023-12-04 ENCOUNTER — HOSPITAL ENCOUNTER (OUTPATIENT)
Dept: MAMMOGRAPHY | Facility: HOSPITAL | Age: 53
Discharge: HOME | End: 2023-12-04
Payer: MEDICARE

## 2023-12-04 ENCOUNTER — APPOINTMENT (OUTPATIENT)
Dept: OTHER | Facility: HOSPITAL | Age: 53
End: 2023-12-04
Payer: MEDICARE

## 2023-12-04 ENCOUNTER — HOSPITAL ENCOUNTER (OUTPATIENT)
Dept: CT IMAGING | Facility: HOSPITAL | Age: 53
Discharge: HOME | End: 2023-12-04
Admitting: PHYSICIAN ASSISTANT
Payer: MEDICARE

## 2023-12-04 DIAGNOSIS — Z12.2 ENCOUNTER FOR SCREENING FOR LUNG CANCER: ICD-10-CM

## 2023-12-04 DIAGNOSIS — F17.210 CIGARETTE SMOKER: ICD-10-CM

## 2023-12-04 DIAGNOSIS — Z12.31 ENCOUNTER FOR SCREENING MAMMOGRAM FOR MALIGNANT NEOPLASM OF BREAST: ICD-10-CM

## 2023-12-04 PROCEDURE — 77063 BREAST TOMOSYNTHESIS BI: CPT

## 2023-12-04 PROCEDURE — 71271 CT THORAX LUNG CANCER SCR C-: CPT

## 2023-12-04 PROCEDURE — 77067 SCR MAMMO BI INCL CAD: CPT

## 2023-12-04 RX ORDER — MIRTAZAPINE 7.5 MG/1
15 TABLET, FILM COATED ORAL NIGHTLY
Qty: 30 TABLET | Refills: 0 | OUTPATIENT
Start: 2023-12-04

## 2023-12-04 NOTE — TELEPHONE ENCOUNTER
Rx Refill Note  Requested Prescriptions     Pending Prescriptions Disp Refills    mirtazapine (REMERON) 7.5 MG tablet [Pharmacy Med Name: MIRTAZAPINE 7.5 MG TABLET] 30 tablet 0     Sig: TAKE TWO TABLETS BY MOUTH ONCE NIGHTLY      Last office visit with prescribing clinician: 11/20/2023     Next office visit with prescribing clinician: 12/21/2023     It looks like the original prescription was for 2 tablets every night with a dispense of 30 tablets to equal a 15 day supply.

## 2023-12-05 ENCOUNTER — TELEPHONE (OUTPATIENT)
Dept: FAMILY MEDICINE CLINIC | Facility: CLINIC | Age: 53
End: 2023-12-05
Payer: MEDICARE

## 2023-12-05 ENCOUNTER — HOSPITAL ENCOUNTER (OUTPATIENT)
Dept: CARDIOLOGY | Facility: HOSPITAL | Age: 53
Discharge: HOME | End: 2023-12-05
Admitting: NURSE PRACTITIONER
Payer: MEDICARE

## 2023-12-05 VITALS
WEIGHT: 140.65 LBS | DIASTOLIC BLOOD PRESSURE: 90 MMHG | SYSTOLIC BLOOD PRESSURE: 156 MMHG | HEIGHT: 66 IN | BODY MASS INDEX: 22.6 KG/M2 | HEART RATE: 61 BPM

## 2023-12-05 DIAGNOSIS — R06.09 DYSPNEA ON EXERTION: ICD-10-CM

## 2023-12-05 DIAGNOSIS — R53.83 OTHER FATIGUE: ICD-10-CM

## 2023-12-05 DIAGNOSIS — R07.89 OTHER CHEST PAIN: ICD-10-CM

## 2023-12-05 DIAGNOSIS — E78.5 DYSLIPIDEMIA: ICD-10-CM

## 2023-12-05 DIAGNOSIS — J18.9 PNEUMONIA OF LEFT LOWER LOBE DUE TO INFECTIOUS ORGANISM: Primary | ICD-10-CM

## 2023-12-05 LAB
BH CV REST NUCLEAR ISOTOPE DOSE: 9.5 MCI
BH CV STRESS BP STAGE 1: NORMAL
BH CV STRESS DURATION MIN STAGE 1: 3
BH CV STRESS DURATION MIN STAGE 2: 1
BH CV STRESS DURATION SEC STAGE 1: 0
BH CV STRESS DURATION SEC STAGE 2: 16
BH CV STRESS GRADE STAGE 1: 10
BH CV STRESS GRADE STAGE 2: 12
BH CV STRESS HR STAGE 1: 101
BH CV STRESS HR STAGE 2: 117
BH CV STRESS METS STAGE 1: 5
BH CV STRESS METS STAGE 2: 7.5
BH CV STRESS NUCLEAR ISOTOPE DOSE: 31.9 MCI
BH CV STRESS O2 STAGE 1: 96
BH CV STRESS PROTOCOL 1: NORMAL
BH CV STRESS PROTOCOL 2 BP STAGE 1: NORMAL
BH CV STRESS PROTOCOL 2 BP STAGE 3: 105
BH CV STRESS PROTOCOL 2 BP STAGE 4: 105
BH CV STRESS PROTOCOL 2 COMMENTS STAGE 1: NORMAL
BH CV STRESS PROTOCOL 2 DOSE REGADENOSON STAGE 1: 0.4
BH CV STRESS PROTOCOL 2 DURATION MIN STAGE 1: 1
BH CV STRESS PROTOCOL 2 DURATION MIN STAGE 2: 1
BH CV STRESS PROTOCOL 2 DURATION MIN STAGE 3: 1
BH CV STRESS PROTOCOL 2 DURATION MIN STAGE 4: 1
BH CV STRESS PROTOCOL 2 HR STAGE 1: 108
BH CV STRESS PROTOCOL 2 HR STAGE 2: 106
BH CV STRESS PROTOCOL 2 HR STAGE 4: NORMAL
BH CV STRESS PROTOCOL 2 O2 STAGE 1: 97
BH CV STRESS PROTOCOL 2 O2 STAGE 2: 98
BH CV STRESS PROTOCOL 2 O2 STAGE 3: 98
BH CV STRESS PROTOCOL 2 O2 STAGE 4: 95
BH CV STRESS PROTOCOL 2 STAGE 1: 1
BH CV STRESS PROTOCOL 2 STAGE 2: 2
BH CV STRESS PROTOCOL 2 STAGE 3: 3
BH CV STRESS PROTOCOL 2 STAGE 4: 4
BH CV STRESS PROTOCOL 2: NORMAL
BH CV STRESS RECOVERY BP: NORMAL MMHG
BH CV STRESS RECOVERY HR: 75 BPM
BH CV STRESS RECOVERY O2: 95 %
BH CV STRESS SPEED STAGE 1: 1.7
BH CV STRESS SPEED STAGE 2: 2.5
BH CV STRESS STAGE 1: 1
BH CV STRESS STAGE 2: 2
LV EF NUC BP: 71 %
MAXIMAL PREDICTED HEART RATE: 168 BPM
PERCENT MAX PREDICTED HR: 71.43 %
STRESS BASELINE BP: NORMAL MMHG
STRESS BASELINE HR: 63 BPM
STRESS O2 SAT REST: 95 %
STRESS PERCENT HR: 84 %
STRESS POST EXERCISE DUR MIN: 4 MIN
STRESS POST EXERCISE DUR SEC: 0 SEC
STRESS POST O2 SAT PEAK: 95 %
STRESS POST PEAK BP: NORMAL MMHG
STRESS POST PEAK HR: 120 BPM
STRESS TARGET HR: 143 BPM

## 2023-12-05 PROCEDURE — 93017 CV STRESS TEST TRACING ONLY: CPT

## 2023-12-05 PROCEDURE — 0 TECHNETIUM SESTAMIBI: Performed by: NURSE PRACTITIONER

## 2023-12-05 PROCEDURE — A9500 TC99M SESTAMIBI: HCPCS | Performed by: NURSE PRACTITIONER

## 2023-12-05 PROCEDURE — 78452 HT MUSCLE IMAGE SPECT MULT: CPT

## 2023-12-05 PROCEDURE — 25010000002 REGADENOSON 0.4 MG/5ML SOLUTION: Performed by: NURSE PRACTITIONER

## 2023-12-05 RX ORDER — PREDNISONE 20 MG/1
20 TABLET ORAL DAILY
Qty: 5 TABLET | Refills: 0 | Status: SHIPPED | OUTPATIENT
Start: 2023-12-05 | End: 2023-12-10

## 2023-12-05 RX ORDER — LEVOFLOXACIN 750 MG/1
750 TABLET, FILM COATED ORAL DAILY
Qty: 7 TABLET | Refills: 0 | Status: SHIPPED | OUTPATIENT
Start: 2023-12-05

## 2023-12-05 RX ORDER — ALBUTEROL SULFATE 90 UG/1
2 AEROSOL, METERED RESPIRATORY (INHALATION)
Status: DISCONTINUED | OUTPATIENT
Start: 2023-12-05 | End: 2023-12-06 | Stop reason: HOSPADM

## 2023-12-05 RX ORDER — CAFFEINE CITRATE 20 MG/ML
60 SOLUTION INTRAVENOUS ONCE
Status: DISCONTINUED | OUTPATIENT
Start: 2023-12-05 | End: 2023-12-06 | Stop reason: HOSPADM

## 2023-12-05 RX ORDER — REGADENOSON 0.08 MG/ML
0.4 INJECTION, SOLUTION INTRAVENOUS ONCE
Status: COMPLETED | OUTPATIENT
Start: 2023-12-05 | End: 2023-12-05

## 2023-12-05 RX ADMIN — TECHNETIUM TC 99M SESTAMIBI 1 DOSE: 1 INJECTION INTRAVENOUS at 11:15

## 2023-12-05 RX ADMIN — TECHNETIUM TC 99M SESTAMIBI 1 DOSE: 1 INJECTION INTRAVENOUS at 09:00

## 2023-12-05 RX ADMIN — REGADENOSON 0.4 MG: 0.08 INJECTION, SOLUTION INTRAVENOUS at 11:10

## 2023-12-05 NOTE — TELEPHONE ENCOUNTER
Reviewed recent CT chest with patient.  It shows LLL pneumonia.  Patient states that she has been coughing and feeling more short of breath.  She thought it was just her COPD getting worse.  Will treat with steroids and levaquin.  Patient will call if symptoms do not improve with medications.  Would want to recheck chest x-ray if symptoms persist.

## 2023-12-08 ENCOUNTER — TELEPHONE (OUTPATIENT)
Dept: CARDIOLOGY | Facility: HOSPITAL | Age: 53
End: 2023-12-08
Payer: MEDICARE

## 2023-12-08 DIAGNOSIS — R07.89 OTHER CHEST PAIN: ICD-10-CM

## 2023-12-08 DIAGNOSIS — R94.39 ABNORMAL NUCLEAR STRESS TEST: Primary | ICD-10-CM

## 2023-12-08 RX ORDER — ATORVASTATIN CALCIUM 40 MG/1
40 TABLET, FILM COATED ORAL DAILY
Qty: 30 TABLET | Refills: 1 | Status: SHIPPED | OUTPATIENT
Start: 2023-12-08

## 2023-12-08 RX ORDER — ASPIRIN 81 MG/1
81 TABLET ORAL DAILY
Start: 2023-12-08

## 2023-12-08 NOTE — TELEPHONE ENCOUNTER
Attempted to call patient to discuss recent stress test.  No answer.  Message was left asking patient to call back.      Called to speak with patient regarding recent stress test.  When asked about ongoing symptoms, patient endorses having ongoing chest pain, and shortness of air.  Of note, patient has had recent diagnosis of pneumonia and is currently being treated with antibiotics and steroids.  After discussion with patient, decision was made to set patient up for cardiac catheterization as stress test did show some abnormality, and she has significant risk factors such as hyperlipidemia, ongoing tobacco use, and medical noncompliance. She has been off her atorvastatin and aspirin for multiple months. She is unable to tell me the reason for this.  We discussed restarting aspirin 81 mg daily, and atorvastatin at 40 mg daily.  I will send in a script for the atorvastatin. She is agreeable to plan and will await call from scheduling to set up cardiac catheterization.

## 2023-12-11 ENCOUNTER — TELEPHONE (OUTPATIENT)
Dept: FAMILY MEDICINE CLINIC | Facility: CLINIC | Age: 53
End: 2023-12-11
Payer: MEDICARE

## 2023-12-11 DIAGNOSIS — R94.39 ABNORMAL NUCLEAR STRESS TEST: Primary | ICD-10-CM

## 2023-12-11 DIAGNOSIS — E78.5 DYSLIPIDEMIA: ICD-10-CM

## 2023-12-11 DIAGNOSIS — R06.09 DYSPNEA ON EXERTION: ICD-10-CM

## 2023-12-11 PROBLEM — R07.89 OTHER CHEST PAIN: Status: ACTIVE | Noted: 2023-12-08

## 2023-12-16 ENCOUNTER — PREP FOR SURGERY (OUTPATIENT)
Dept: OTHER | Facility: HOSPITAL | Age: 53
End: 2023-12-16
Payer: MEDICARE

## 2023-12-16 RX ORDER — ASPIRIN 81 MG/1
81 TABLET ORAL DAILY
OUTPATIENT
Start: 2023-12-17

## 2023-12-16 RX ORDER — ASPIRIN 81 MG/1
324 TABLET, CHEWABLE ORAL ONCE
OUTPATIENT
Start: 2023-12-16 | End: 2023-12-16

## 2023-12-27 ENCOUNTER — HOSPITAL ENCOUNTER (OUTPATIENT)
Facility: HOSPITAL | Age: 53
Setting detail: HOSPITAL OUTPATIENT SURGERY
Discharge: HOME | End: 2023-12-27
Attending: INTERNAL MEDICINE | Admitting: INTERNAL MEDICINE
Payer: MEDICARE

## 2023-12-27 VITALS
HEIGHT: 67 IN | BODY MASS INDEX: 21.45 KG/M2 | SYSTOLIC BLOOD PRESSURE: 168 MMHG | TEMPERATURE: 97.8 F | RESPIRATION RATE: 15 BRPM | HEART RATE: 60 BPM | OXYGEN SATURATION: 92 % | WEIGHT: 136.69 LBS | DIASTOLIC BLOOD PRESSURE: 88 MMHG

## 2023-12-27 DIAGNOSIS — R07.89 OTHER CHEST PAIN: ICD-10-CM

## 2023-12-27 DIAGNOSIS — R94.39 ABNORMAL NUCLEAR STRESS TEST: ICD-10-CM

## 2023-12-27 LAB
ALBUMIN SERPL-MCNC: 4.3 G/DL (ref 3.5–5.2)
ALBUMIN/GLOB SERPL: 2 G/DL
ALP SERPL-CCNC: 94 U/L (ref 39–117)
ALT SERPL W P-5'-P-CCNC: 10 U/L (ref 1–33)
ANION GAP SERPL CALCULATED.3IONS-SCNC: 9 MMOL/L (ref 5–15)
AST SERPL-CCNC: 9 U/L (ref 1–32)
BILIRUB SERPL-MCNC: 0.5 MG/DL (ref 0–1.2)
BUN SERPL-MCNC: 15 MG/DL (ref 6–20)
BUN/CREAT SERPL: 17.4 (ref 7–25)
CALCIUM SPEC-SCNC: 9.4 MG/DL (ref 8.6–10.5)
CHLORIDE SERPL-SCNC: 104 MMOL/L (ref 98–107)
CHOLEST SERPL-MCNC: 185 MG/DL (ref 0–200)
CO2 SERPL-SCNC: 26 MMOL/L (ref 22–29)
CREAT BLDA-MCNC: 0.8 MG/DL (ref 0.6–1.3)
CREAT SERPL-MCNC: 0.86 MG/DL (ref 0.57–1)
DEPRECATED RDW RBC AUTO: 42.5 FL (ref 37–54)
EGFRCR SERPLBLD CKD-EPI 2021: 81.4 ML/MIN/1.73
ERYTHROCYTE [DISTWIDTH] IN BLOOD BY AUTOMATED COUNT: 13.1 % (ref 12.3–15.4)
GLOBULIN UR ELPH-MCNC: 2.2 GM/DL
GLUCOSE SERPL-MCNC: 92 MG/DL (ref 65–99)
HCT VFR BLD AUTO: 44.4 % (ref 34–46.6)
HDLC SERPL-MCNC: 45 MG/DL (ref 40–60)
HGB BLD-MCNC: 15.1 G/DL (ref 12–15.9)
LDLC SERPL CALC-MCNC: 120 MG/DL (ref 0–100)
LDLC/HDLC SERPL: 2.61 {RATIO}
MCH RBC QN AUTO: 30.4 PG (ref 26.6–33)
MCHC RBC AUTO-ENTMCNC: 34 G/DL (ref 31.5–35.7)
MCV RBC AUTO: 89.5 FL (ref 79–97)
PLATELET # BLD AUTO: 204 10*3/MM3 (ref 140–450)
PMV BLD AUTO: 10.5 FL (ref 6–12)
POTASSIUM SERPL-SCNC: 4.4 MMOL/L (ref 3.5–5.2)
PROT SERPL-MCNC: 6.5 G/DL (ref 6–8.5)
RBC # BLD AUTO: 4.96 10*6/MM3 (ref 3.77–5.28)
SODIUM SERPL-SCNC: 139 MMOL/L (ref 136–145)
TRIGL SERPL-MCNC: 112 MG/DL (ref 0–150)
VLDLC SERPL-MCNC: 20 MG/DL (ref 5–40)
WBC NRBC COR # BLD AUTO: 7.39 10*3/MM3 (ref 3.4–10.8)

## 2023-12-27 PROCEDURE — C1769 GUIDE WIRE: HCPCS | Performed by: INTERNAL MEDICINE

## 2023-12-27 PROCEDURE — 25010000002 MIDAZOLAM PER 1 MG: Performed by: INTERNAL MEDICINE

## 2023-12-27 PROCEDURE — 25010000002 HEPARIN (PORCINE) PER 1000 UNITS: Performed by: INTERNAL MEDICINE

## 2023-12-27 PROCEDURE — 93458 L HRT ARTERY/VENTRICLE ANGIO: CPT | Performed by: INTERNAL MEDICINE

## 2023-12-27 PROCEDURE — 25810000003 SODIUM CHLORIDE 0.9 % SOLUTION: Performed by: PHYSICIAN ASSISTANT

## 2023-12-27 PROCEDURE — C1894 INTRO/SHEATH, NON-LASER: HCPCS | Performed by: INTERNAL MEDICINE

## 2023-12-27 PROCEDURE — 85027 COMPLETE CBC AUTOMATED: CPT | Performed by: PHYSICIAN ASSISTANT

## 2023-12-27 PROCEDURE — 80061 LIPID PANEL: CPT | Performed by: PHYSICIAN ASSISTANT

## 2023-12-27 PROCEDURE — 82565 ASSAY OF CREATININE: CPT

## 2023-12-27 PROCEDURE — 25810000003 SODIUM CHLORIDE 0.9 % SOLUTION: Performed by: INTERNAL MEDICINE

## 2023-12-27 PROCEDURE — 25010000002 FENTANYL CITRATE (PF) 50 MCG/ML SOLUTION: Performed by: INTERNAL MEDICINE

## 2023-12-27 PROCEDURE — 25510000001 IOPAMIDOL PER 1 ML: Performed by: INTERNAL MEDICINE

## 2023-12-27 PROCEDURE — 80053 COMPREHEN METABOLIC PANEL: CPT | Performed by: PHYSICIAN ASSISTANT

## 2023-12-27 RX ORDER — HEPARIN SODIUM 1000 [USP'U]/ML
INJECTION, SOLUTION INTRAVENOUS; SUBCUTANEOUS
Status: DISCONTINUED | OUTPATIENT
Start: 2023-12-27 | End: 2023-12-27 | Stop reason: HOSPADM

## 2023-12-27 RX ORDER — LIDOCAINE HYDROCHLORIDE 10 MG/ML
INJECTION, SOLUTION EPIDURAL; INFILTRATION; INTRACAUDAL; PERINEURAL
Status: DISCONTINUED | OUTPATIENT
Start: 2023-12-27 | End: 2023-12-27 | Stop reason: HOSPADM

## 2023-12-27 RX ORDER — ASPIRIN 81 MG/1
81 TABLET ORAL DAILY
Status: DISCONTINUED | OUTPATIENT
Start: 2023-12-28 | End: 2023-12-27 | Stop reason: HOSPADM

## 2023-12-27 RX ORDER — ACETAMINOPHEN 325 MG/1
650 TABLET ORAL EVERY 4 HOURS PRN
Status: DISCONTINUED | OUTPATIENT
Start: 2023-12-27 | End: 2023-12-27 | Stop reason: HOSPADM

## 2023-12-27 RX ORDER — NITROGLYCERIN 0.4 MG/1
0.4 TABLET SUBLINGUAL
Status: DISCONTINUED | OUTPATIENT
Start: 2023-12-27 | End: 2023-12-27 | Stop reason: HOSPADM

## 2023-12-27 RX ORDER — ASPIRIN 81 MG/1
324 TABLET, CHEWABLE ORAL ONCE
Status: COMPLETED | OUTPATIENT
Start: 2023-12-27 | End: 2023-12-27

## 2023-12-27 RX ORDER — MIDAZOLAM HYDROCHLORIDE 1 MG/ML
INJECTION INTRAMUSCULAR; INTRAVENOUS
Status: DISCONTINUED | OUTPATIENT
Start: 2023-12-27 | End: 2023-12-27 | Stop reason: HOSPADM

## 2023-12-27 RX ORDER — NICARDIPINE HCL-0.9% SOD CHLOR 1 MG/10 ML
SYRINGE (ML) INTRAVENOUS
Status: DISCONTINUED | OUTPATIENT
Start: 2023-12-27 | End: 2023-12-27 | Stop reason: HOSPADM

## 2023-12-27 RX ORDER — FENTANYL CITRATE 50 UG/ML
INJECTION, SOLUTION INTRAMUSCULAR; INTRAVENOUS
Status: DISCONTINUED | OUTPATIENT
Start: 2023-12-27 | End: 2023-12-27 | Stop reason: HOSPADM

## 2023-12-27 RX ADMIN — SODIUM CHLORIDE 191.4 ML: 9 INJECTION, SOLUTION INTRAVENOUS at 11:48

## 2023-12-27 RX ADMIN — ASPIRIN 324 MG: 81 TABLET, CHEWABLE ORAL at 11:53

## 2023-12-27 NOTE — H&P
[facility] Cardiology   History and Physical           IDENTIFICATION: 53-year-old female residing in [place], [place]      Active Hospital Problems    Diagnosis  POA    Abnormal nuclear stress test [R94.39]  Unknown    Other chest pain [R07.89]  Unknown   Problem list:   Anxiety  Hyperlipidemia  Emphysema/COPD  Hypertension  GERD  IBS  Seizures Guillain-Barré syndrome  PTSD  CVA  CKD stage III  Ongoing tobacco use, 1 PPD  Sleep apnea-has been ordered home oxygen at  but does not use it.                 History of Present Illness: Arcelia Quintero is a 53-year-old female with above medical history, and multiple cardiac risk factors, who presents today for left heart catheterization for definitive evaluation following abnormal stress test.  Patient has recent history of atypical, midsternal/left-sided chest pain/strain, without exacerbating or alleviating factors.  Pain lasts anywhere from a few minutes to few hours.  Stress test 12/5 showed small sized infarct with mild judson-infarct ischemia.  Patient does have chronic dyspnea, which she feels has worsened recently.  She endorses dizziness, mild LE edema, palpitations, and fatigue, but denies any syncopal episode.  Amlodipine recently decreased due to mouth sores.  Patient checks intermittent blood pressure at home, which typically runs 120s to 130s systolic.  She is currently free of chest pain or shortness of breath.    Allergies   Allergen Reactions    Codeine Hives    Influenza Vaccines Other (See Comments)     Got GB syndrome    Propofol Other (See Comments)     Propofol infusion reaction, weakness, tremors, paralysis       Prior to Admission medications    Medication Sig Start Date End Date Taking? Authorizing Provider   amLODIPine (NORVASC) 5 MG tablet Take 1 tablet by mouth Daily. 11/6/23  Yes Coby Ely PA-C   aspirin 81 MG EC tablet Take 1 tablet by mouth Daily. 12/8/23  Yes Karol Fitch APRN    atorvastatin (LIPITOR) 40 MG tablet Take 1 tablet by mouth Daily. 12/8/23  Yes Karol Fitch APRN   Budeson-Glycopyrrol-Formoterol (BREZTRI) 160-9-4.8 MCG/ACT aerosol inhaler Inhale 2 puffs 2 (Two) Times a Day. 11/6/23  Yes Coby Ely PA-C   carvedilol (COREG) 12.5 MG tablet Take 1 tablet by mouth 2 (Two) Times a Day With Meals.  Patient taking differently: Take 0.5 tablets by mouth 2 (Two) Times a Day With Meals. 10/9/23  Yes Coby Ely PA-C   FLUoxetine (PROzac) 20 MG capsule Take 1 capsule by mouth Daily. 11/20/23  Yes Mary Canales APRN   lactulose (CHRONULAC) 10 GM/15ML solution Take 45 mL by mouth 2 (Two) Times a Day. 8/17/23  Yes ProviderDenis MD   linaclotide (Linzess) 145 MCG capsule capsule Take 1 capsule by mouth Every Morning Before Breakfast. 9/22/23  Yes Kelby Sawant APRN   mirtazapine (REMERON) 7.5 MG tablet Take 2 tablets by mouth Every Night. 11/20/23  Yes Mary Canales APRN   pantoprazole (PROTONIX) 40 MG EC tablet TAKE 1 TABLET BY MOUTH DAILY 11/17/23  Yes Coby Ely PA-C   topiramate (TOPAMAX) 25 MG tablet TAKE ONE TABLET BY MOUTH ONCE NIGHTLY FOR 7 DAYS THEN TAKE 1 TABLET BY MOUTH TWICE A DAY FOR 7 DAYS THEN TAKE ONE TABLET BY MOUTH EVERY MORNING AND 2 TABLETS AT NIGHT FOR 7 DAYS  Patient taking differently: Take 1 tablet by mouth 2 (Two) Times a Day. TAKE ONE TABLET BY MOUTH ONCE NIGHTLY FOR 7 DAYS THEN TAKE 1 TABLET BY MOUTH TWICE A DAY FOR 7 DAYS THEN TAKE ONE TABLET BY MOUTH EVERY MORNING AND 2 TABLETS AT NIGHT FOR 7 DAYS 10/16/23  Yes Carolina Tello MD   rizatriptan (Maxalt) 10 MG tablet Take 1 tablet at onset of headache.  May repeat once in 2 hours.  Do not take more than 2 tabs a day or 8 tabs a month 9/28/23   Carolina Tello MD   levoFLOXacin (Levaquin) 750 MG tablet Take 1 tablet by mouth Daily. 12/5/23 12/27/23  Coby Ely PA-C       Past Medical History:   Diagnosis Date    Abnormal Pap smear of  cervix     Allergic rhinitis     Asthma     Atypical chest pain     Brain tumor     Status post brain tumor resection in 1989.    Candidiasis of mouth     Cervical high risk HPV (human papillomavirus) test positive 09/01/2020    CKD (chronic kidney disease), stage III     Colon polyps 09/09/2020    sessile and tubular adenoma    Conversion disorder     COPD (chronic obstructive pulmonary disease)     COVID-19     Elevated liver enzymes     Former smoker 07/05/2023    H/O Helicobacter infection      Status post EGD 9/4/2012, pathology revealed H. pylori infection.    Headache     migraines    History of Guillain-Post syndrome due to influenza immunization     Neurology evaluation thought it was numbness from carpal tunnel and not Guillain-Post    Hyperlipidemia     Hypertension     Influenza     Internal hemorrhoids     Low grade squamous intraepith lesion on cytologic smear cervix (lgsil) 09/01/2020    Median neuropathy     Migraine     PTSD (post-traumatic stress disorder)     Seizures     2 seizures after surgery    Small fiber neuropathy     Stroke     Tinea corporis        Past Surgical History:   Procedure Laterality Date    BRAIN SURGERY      status post brain tumor resection    COLONOSCOPY      polyp removal    LIVER BIOPSY      UPPER GASTROINTESTINAL ENDOSCOPY      Status post EGD 9/4/2012, pathology revealed H. pylori infection.       Family History   Adopted: Yes   Problem Relation Age of Onset    Lung disease Mother     Heart disease Mother     Stroke Mother     Throat cancer Mother     Uterine cancer Mother     Ovarian cancer Mother     Prostate cancer Father     No Known Problems Sister     No Known Problems Brother     Breast cancer Other     Diabetes Other     Colon cancer Other        Social History     Tobacco Use   Smoking Status Every Day    Packs/day: 0.50    Years: 41.00    Additional pack years: 0.00    Total pack years: 20.50    Types: Cigarettes    Start date: 1/1/1981    Last attempt to  quit: 2022    Years since quittin.2    Passive exposure: Current   Smokeless Tobacco Never   Tobacco Comments    quit with chantix in past       Social History     Substance and Sexual Activity   Alcohol Use No         Review of Systems:   Review of Systems   Cardiovascular:  Positive for chest pain, dyspnea on exertion, leg swelling and palpitations.            Vital Sign Min/Max for last 24 hours  Temp  Min: 97.8 °F (36.6 °C)  Max: 97.8 °F (36.6 °C)   BP  Min: 143/86  Max: 154/90   Pulse  Min: 62  Max: 62   Resp  Min: 16  Max: 16   SpO2  Min: 96 %  Max: 96 %   No data recorded    No intake or output data in the 24 hours ending 23 1216        Tele: Normal sinus rhythm    Vitals reviewed.   Constitutional:       General: Awake.   Pulmonary:      Effort: Pulmonary effort is normal.      Breath sounds: Normal breath sounds.   Cardiovascular:      PMI at left midclavicular line. Normal rate. Regular rhythm. Normal S1. Normal S2.       Murmurs: There is no murmur.   Edema:     Peripheral edema absent.   Skin:     General: Skin is warm and dry.      Capillary Refill: Capillary refill takes less than 2 seconds.   Neurological:      Mental Status: Alert.              DATA REVIEW:      ECHO (2023):     Left ventricular systolic function is normal. Calculated left ventricular EF = 61% Left ventricular ejection fraction appears to be 61 - 65%.    Left ventricular diastolic function was normal.       [unfilled]    Results from last 7 days   Lab Units 23  1140   CREATININE mg/dL 0.80     Lab Results   Lab Value Date/Time    PHOS 4.2 2023 0408    MG 2.0 2023 0408         Results from last 7 days   Lab Units 23  1130   WBC 10*3/mm3 7.39   HEMOGLOBIN g/dL 15.1   HEMATOCRIT % 44.4   PLATELETS 10*3/mm3 204     Lab Results   Component Value Date    HGBA1C 5.40 2023     Lab Results   Component Value Date    CHOL 163 2023    CHLPL 139 10/16/2015    TRIG 152 (H) 2023     HDL 32 (L) 07/06/2023     (H) 07/06/2023              Abnormal stress test  MPS 12/5: small sized infarct with mild judson-infarct ischemia. Frequent PVCs with exercise.   Atypical chest pain  Hyperlipidemia  Atorvastatin 40 mg  Ongoing tobacco use                 Labs reviewed.  Proceed with left heart catheterization plus or minus catheter-based intervention via right radial approach.  Procedure, risks, benefits have been discussed and patient is agreeable to proceed.  Patient is on aspirin 81 mg daily, and will be premedicated with aspirin 325 mg prior to procedure.  Further recommendations to follow procedure.    LEANN Gonzalez        Electronically signed by LEANN Gonzalez, 12/27/23, 12:16 PM EST.

## 2024-01-09 ENCOUNTER — HOSPITAL ENCOUNTER (EMERGENCY)
Facility: HOSPITAL | Age: 54
Discharge: HOME | End: 2024-01-09
Attending: EMERGENCY MEDICINE | Admitting: EMERGENCY MEDICINE
Payer: MEDICARE

## 2024-01-09 ENCOUNTER — APPOINTMENT (OUTPATIENT)
Dept: CT IMAGING | Facility: HOSPITAL | Age: 54
End: 2024-01-09
Payer: MEDICARE

## 2024-01-09 ENCOUNTER — APPOINTMENT (OUTPATIENT)
Dept: GENERAL RADIOLOGY | Facility: HOSPITAL | Age: 54
End: 2024-01-09
Payer: MEDICARE

## 2024-01-09 VITALS
HEART RATE: 61 BPM | OXYGEN SATURATION: 92 % | WEIGHT: 130 LBS | BODY MASS INDEX: 20.4 KG/M2 | DIASTOLIC BLOOD PRESSURE: 84 MMHG | RESPIRATION RATE: 14 BRPM | HEIGHT: 67 IN | TEMPERATURE: 98.5 F | SYSTOLIC BLOOD PRESSURE: 141 MMHG

## 2024-01-09 DIAGNOSIS — R55 SYNCOPE AND COLLAPSE: Primary | ICD-10-CM

## 2024-01-09 DIAGNOSIS — R56.9 GENERALIZED SEIZURE: ICD-10-CM

## 2024-01-09 DIAGNOSIS — J44.1 COPD EXACERBATION: ICD-10-CM

## 2024-01-09 LAB
ALT SERPL W P-5'-P-CCNC: 15 U/L (ref 1–33)
AMPHET+METHAMPHET UR QL: NEGATIVE
AMPHETAMINES UR QL: NEGATIVE
APTT PPP: 32.1 SECONDS (ref 22–39)
AST SERPL-CCNC: 15 U/L (ref 1–32)
BACTERIA UR QL AUTO: NORMAL /HPF
BARBITURATES UR QL SCN: NEGATIVE
BASOPHILS # BLD AUTO: 0.06 10*3/MM3 (ref 0–0.2)
BASOPHILS NFR BLD AUTO: 0.8 % (ref 0–1.5)
BENZODIAZ UR QL SCN: POSITIVE
BILIRUB UR QL STRIP: NEGATIVE
BUN BLDA-MCNC: 11 MG/DL (ref 8–26)
BUPRENORPHINE SERPL-MCNC: NEGATIVE NG/ML
CA-I BLDA-SCNC: 1.3 MMOL/L (ref 1.2–1.32)
CANNABINOIDS SERPL QL: NEGATIVE
CHLORIDE BLDA-SCNC: 102 MMOL/L (ref 98–109)
CLARITY UR: CLEAR
CO2 BLDA-SCNC: 26 MMOL/L (ref 24–29)
COCAINE UR QL: NEGATIVE
COLOR UR: YELLOW
CREAT BLDA-MCNC: 0.8 MG/DL (ref 0.6–1.3)
D-LACTATE SERPL-SCNC: 1.3 MMOL/L (ref 0.5–2)
DEPRECATED RDW RBC AUTO: 43 FL (ref 37–54)
EGFRCR SERPLBLD CKD-EPI 2021: 88.8 ML/MIN/1.73
EOSINOPHIL # BLD AUTO: 0.11 10*3/MM3 (ref 0–0.4)
EOSINOPHIL NFR BLD AUTO: 1.4 % (ref 0.3–6.2)
ERYTHROCYTE [DISTWIDTH] IN BLOOD BY AUTOMATED COUNT: 12.6 % (ref 12.3–15.4)
FENTANYL UR-MCNC: NEGATIVE NG/ML
FLUAV RNA RESP QL NAA+PROBE: NOT DETECTED
FLUBV RNA RESP QL NAA+PROBE: NOT DETECTED
GLUCOSE BLDC GLUCOMTR-MCNC: 125 MG/DL (ref 70–130)
GLUCOSE UR STRIP-MCNC: NEGATIVE MG/DL
HCT VFR BLD AUTO: 42.9 % (ref 34–46.6)
HCT VFR BLDA CALC: 43 % (ref 38–51)
HGB BLD-MCNC: 14.6 G/DL (ref 12–15.9)
HGB BLDA-MCNC: 14.6 G/DL (ref 12–17)
HGB UR QL STRIP.AUTO: ABNORMAL
HOLD SPECIMEN: NORMAL
HYALINE CASTS UR QL AUTO: NORMAL /LPF
IMM GRANULOCYTES # BLD AUTO: 0.03 10*3/MM3 (ref 0–0.05)
IMM GRANULOCYTES NFR BLD AUTO: 0.4 % (ref 0–0.5)
INR PPP: 1.2 (ref 0.8–1.2)
KETONES UR QL STRIP: NEGATIVE
LEUKOCYTE ESTERASE UR QL STRIP.AUTO: NEGATIVE
LYMPHOCYTES # BLD AUTO: 1.17 10*3/MM3 (ref 0.7–3.1)
LYMPHOCYTES NFR BLD AUTO: 14.7 % (ref 19.6–45.3)
MCH RBC QN AUTO: 31.4 PG (ref 26.6–33)
MCHC RBC AUTO-ENTMCNC: 34 G/DL (ref 31.5–35.7)
MCV RBC AUTO: 92.3 FL (ref 79–97)
METHADONE UR QL SCN: NEGATIVE
MONOCYTES # BLD AUTO: 0.21 10*3/MM3 (ref 0.1–0.9)
MONOCYTES NFR BLD AUTO: 2.6 % (ref 5–12)
NEUTROPHILS NFR BLD AUTO: 6.39 10*3/MM3 (ref 1.7–7)
NEUTROPHILS NFR BLD AUTO: 80.1 % (ref 42.7–76)
NITRITE UR QL STRIP: NEGATIVE
NRBC BLD AUTO-RTO: 0 /100 WBC (ref 0–0.2)
OPIATES UR QL: NEGATIVE
OXYCODONE UR QL SCN: NEGATIVE
PCP UR QL SCN: NEGATIVE
PH UR STRIP.AUTO: 6 [PH] (ref 5–8)
PLATELET # BLD AUTO: 187 10*3/MM3 (ref 140–450)
PMV BLD AUTO: 11 FL (ref 6–12)
POTASSIUM BLDA-SCNC: 4.1 MMOL/L (ref 3.5–4.9)
PROT UR QL STRIP: NEGATIVE
PROTHROMBIN TIME: 13.9 SECONDS (ref 12.8–15.2)
RBC # BLD AUTO: 4.65 10*6/MM3 (ref 3.77–5.28)
RBC # UR STRIP: NORMAL /HPF
REF LAB TEST METHOD: NORMAL
SARS-COV-2 RNA RESP QL NAA+PROBE: NOT DETECTED
SODIUM BLD-SCNC: 140 MMOL/L (ref 138–146)
SP GR UR STRIP: <=1.005 (ref 1–1.03)
SQUAMOUS #/AREA URNS HPF: NORMAL /HPF
TRICYCLICS UR QL SCN: NEGATIVE
TROPONIN T SERPL HS-MCNC: <6 NG/L
TROPONIN T SERPL HS-MCNC: <6 NG/L
UROBILINOGEN UR QL STRIP: ABNORMAL
WBC # UR STRIP: NORMAL /HPF
WBC NRBC COR # BLD AUTO: 7.97 10*3/MM3 (ref 3.4–10.8)
WHOLE BLOOD HOLD COAG: NORMAL
WHOLE BLOOD HOLD SPECIMEN: NORMAL

## 2024-01-09 PROCEDURE — 84460 ALANINE AMINO (ALT) (SGPT): CPT | Performed by: EMERGENCY MEDICINE

## 2024-01-09 PROCEDURE — 85730 THROMBOPLASTIN TIME PARTIAL: CPT | Performed by: EMERGENCY MEDICINE

## 2024-01-09 PROCEDURE — 0042T HC CT CEREBRAL PERFUSION W/WO CONTRAST: CPT

## 2024-01-09 PROCEDURE — 99284 EMERGENCY DEPT VISIT MOD MDM: CPT

## 2024-01-09 PROCEDURE — 85014 HEMATOCRIT: CPT

## 2024-01-09 PROCEDURE — 81001 URINALYSIS AUTO W/SCOPE: CPT | Performed by: EMERGENCY MEDICINE

## 2024-01-09 PROCEDURE — 71045 X-RAY EXAM CHEST 1 VIEW: CPT

## 2024-01-09 PROCEDURE — 84450 TRANSFERASE (AST) (SGOT): CPT | Performed by: EMERGENCY MEDICINE

## 2024-01-09 PROCEDURE — 99285 EMERGENCY DEPT VISIT HI MDM: CPT

## 2024-01-09 PROCEDURE — 96375 TX/PRO/DX INJ NEW DRUG ADDON: CPT

## 2024-01-09 PROCEDURE — 85610 PROTHROMBIN TIME: CPT

## 2024-01-09 PROCEDURE — 70498 CT ANGIOGRAPHY NECK: CPT

## 2024-01-09 PROCEDURE — P9612 CATHETERIZE FOR URINE SPEC: HCPCS

## 2024-01-09 PROCEDURE — 85025 COMPLETE CBC W/AUTO DIFF WBC: CPT | Performed by: EMERGENCY MEDICINE

## 2024-01-09 PROCEDURE — 36415 COLL VENOUS BLD VENIPUNCTURE: CPT

## 2024-01-09 PROCEDURE — 93005 ELECTROCARDIOGRAM TRACING: CPT | Performed by: EMERGENCY MEDICINE

## 2024-01-09 PROCEDURE — 84484 ASSAY OF TROPONIN QUANT: CPT | Performed by: EMERGENCY MEDICINE

## 2024-01-09 PROCEDURE — 80307 DRUG TEST PRSMV CHEM ANLYZR: CPT | Performed by: EMERGENCY MEDICINE

## 2024-01-09 PROCEDURE — 70496 CT ANGIOGRAPHY HEAD: CPT

## 2024-01-09 PROCEDURE — 80047 BASIC METABLC PNL IONIZED CA: CPT

## 2024-01-09 PROCEDURE — 25010000002 KETOROLAC TROMETHAMINE PER 15 MG: Performed by: EMERGENCY MEDICINE

## 2024-01-09 PROCEDURE — 25510000001 IOPAMIDOL PER 1 ML

## 2024-01-09 PROCEDURE — 25010000002 METHYLPREDNISOLONE PER 125 MG: Performed by: EMERGENCY MEDICINE

## 2024-01-09 PROCEDURE — 25010000002 LEVETRIRACETAM PER 10 MG: Performed by: EMERGENCY MEDICINE

## 2024-01-09 PROCEDURE — 70450 CT HEAD/BRAIN W/O DYE: CPT

## 2024-01-09 PROCEDURE — 83605 ASSAY OF LACTIC ACID: CPT | Performed by: EMERGENCY MEDICINE

## 2024-01-09 PROCEDURE — 96374 THER/PROPH/DIAG INJ IV PUSH: CPT

## 2024-01-09 PROCEDURE — 94640 AIRWAY INHALATION TREATMENT: CPT

## 2024-01-09 PROCEDURE — 25810000003 SODIUM CHLORIDE 0.9 % SOLUTION: Performed by: EMERGENCY MEDICINE

## 2024-01-09 PROCEDURE — 87636 SARSCOV2 & INF A&B AMP PRB: CPT | Performed by: EMERGENCY MEDICINE

## 2024-01-09 RX ORDER — METHYLPREDNISOLONE SODIUM SUCCINATE 125 MG/2ML
125 INJECTION, POWDER, LYOPHILIZED, FOR SOLUTION INTRAMUSCULAR; INTRAVENOUS ONCE
Status: COMPLETED | OUTPATIENT
Start: 2024-01-09 | End: 2024-01-09

## 2024-01-09 RX ORDER — KETOROLAC TROMETHAMINE 15 MG/ML
15 INJECTION, SOLUTION INTRAMUSCULAR; INTRAVENOUS ONCE
Status: COMPLETED | OUTPATIENT
Start: 2024-01-09 | End: 2024-01-09

## 2024-01-09 RX ORDER — LEVETIRACETAM 100 MG/ML
500 SOLUTION ORAL 2 TIMES DAILY
Qty: 300 ML | Refills: 0 | Status: SHIPPED | OUTPATIENT
Start: 2024-01-09

## 2024-01-09 RX ORDER — AZITHROMYCIN 250 MG/1
500 TABLET, FILM COATED ORAL ONCE
Status: COMPLETED | OUTPATIENT
Start: 2024-01-09 | End: 2024-01-09

## 2024-01-09 RX ORDER — BENZONATATE 200 MG/1
200 CAPSULE ORAL 3 TIMES DAILY PRN
Qty: 9 CAPSULE | Refills: 0 | Status: SHIPPED | OUTPATIENT
Start: 2024-01-09

## 2024-01-09 RX ORDER — AZITHROMYCIN 250 MG/1
250 TABLET, FILM COATED ORAL DAILY
Qty: 4 TABLET | Refills: 0 | Status: SHIPPED | OUTPATIENT
Start: 2024-01-09

## 2024-01-09 RX ORDER — PREDNISONE 20 MG/1
TABLET ORAL
Qty: 10 TABLET | Refills: 0 | Status: SHIPPED | OUTPATIENT
Start: 2024-01-09

## 2024-01-09 RX ORDER — SODIUM CHLORIDE 0.9 % (FLUSH) 0.9 %
10 SYRINGE (ML) INJECTION AS NEEDED
Status: DISCONTINUED | OUTPATIENT
Start: 2024-01-09 | End: 2024-01-09 | Stop reason: HOSPADM

## 2024-01-09 RX ORDER — LEVETIRACETAM 500 MG/5ML
1000 INJECTION, SOLUTION, CONCENTRATE INTRAVENOUS ONCE
Status: COMPLETED | OUTPATIENT
Start: 2024-01-09 | End: 2024-01-09

## 2024-01-09 RX ORDER — ALBUTEROL SULFATE 2.5 MG/3ML
2.5 SOLUTION RESPIRATORY (INHALATION) ONCE
Status: COMPLETED | OUTPATIENT
Start: 2024-01-09 | End: 2024-01-09

## 2024-01-09 RX ADMIN — AZITHROMYCIN DIHYDRATE 500 MG: 250 TABLET ORAL at 16:24

## 2024-01-09 RX ADMIN — ALBUTEROL SULFATE 2.5 MG: 2.5 SOLUTION RESPIRATORY (INHALATION) at 15:47

## 2024-01-09 RX ADMIN — IOPAMIDOL 115 ML: 755 INJECTION, SOLUTION INTRAVENOUS at 12:46

## 2024-01-09 RX ADMIN — LEVETIRACETAM 1000 MG: 100 INJECTION, SOLUTION INTRAVENOUS at 16:25

## 2024-01-09 RX ADMIN — METHYLPREDNISOLONE SODIUM SUCCINATE 125 MG: 125 INJECTION INTRAMUSCULAR; INTRAVENOUS at 16:23

## 2024-01-09 RX ADMIN — KETOROLAC TROMETHAMINE 15 MG: 15 INJECTION, SOLUTION INTRAMUSCULAR; INTRAVENOUS at 14:07

## 2024-01-09 RX ADMIN — SODIUM CHLORIDE 1000 ML: 9 INJECTION, SOLUTION INTRAVENOUS at 14:06

## 2024-01-09 NOTE — DISCHARGE INSTRUCTIONS
Call your primary care provider and arrange close follow-up.  Drink extra liquids.  I have sent a prescription for cough medicine, seizure medicine, antibiotics, and prednisone.  Return if any concerns.  Someone from the neurology clinic will call you to arrange follow-up as well.
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
## 2024-01-09 NOTE — ED PROVIDER NOTES
Subjective   History of Present Illness  Mrs. Quintero is brought by EMS with altered mental status.  Her son reported that he heard her make a strange noise and went to her bedroom and found her confused.  She tells me she does not remember that but does remember feeling dizzy earlier today.  She tells me she has had headache, cough, and runny nose for 3 to 4 days.  She denies fevers or chills.      Review of Systems    Past Medical History:   Diagnosis Date    Abnormal Pap smear of cervix     Allergic rhinitis     Asthma     Atypical chest pain     Brain tumor     Status post brain tumor resection in 1989.    Candidiasis of mouth     Cervical high risk HPV (human papillomavirus) test positive 09/01/2020    CKD (chronic kidney disease), stage III     Colon polyps 09/09/2020    sessile and tubular adenoma    Conversion disorder     COPD (chronic obstructive pulmonary disease)     COVID-19     Elevated liver enzymes     Former smoker 07/05/2023    H/O Helicobacter infection      Status post EGD 9/4/2012, pathology revealed H. pylori infection.    Headache     migraines    History of Guillain-Aitkin syndrome due to influenza immunization     Neurology evaluation thought it was numbness from carpal tunnel and not Guillain-Aitkin    Hyperlipidemia     Hypertension     Influenza     Internal hemorrhoids     Low grade squamous intraepith lesion on cytologic smear cervix (lgsil) 09/01/2020    Median neuropathy     Migraine     PTSD (post-traumatic stress disorder)     Seizures     2 seizures after surgery    Small fiber neuropathy     Stroke     Tinea corporis        Allergies   Allergen Reactions    Codeine Hives    Influenza Vaccines Other (See Comments)     Got GB syndrome    Propofol Other (See Comments)     Propofol infusion reaction, weakness, tremors, paralysis       Past Surgical History:   Procedure Laterality Date    BRAIN SURGERY      status post brain tumor resection    CARDIAC CATHETERIZATION N/A 12/27/2023     Procedure: Left Heart Cath;  Surgeon: Ryder Delarosa III, MD;  Location: [facility] CATH INVASIVE LOCATION;  Service: Cardiovascular;  Laterality: N/A;    COLONOSCOPY      polyp removal    LIVER BIOPSY      UPPER GASTROINTESTINAL ENDOSCOPY      Status post EGD 2012, pathology revealed H. pylori infection.       Family History   Adopted: Yes   Problem Relation Age of Onset    Lung disease Mother     Heart disease Mother     Stroke Mother     Throat cancer Mother     Uterine cancer Mother     Ovarian cancer Mother     Prostate cancer Father     No Known Problems Sister     No Known Problems Brother     Breast cancer Other     Diabetes Other     Colon cancer Other        Social History     Socioeconomic History    Marital status: Legally    Tobacco Use    Smoking status: Every Day     Packs/day: 0.50     Years: 41.00     Additional pack years: 0.00     Total pack years: 20.50     Types: Cigarettes     Start date: 1981     Last attempt to quit: 2022     Years since quittin.3     Passive exposure: Current    Smokeless tobacco: Never    Tobacco comments:     quit with chantix in past   Vaping Use    Vaping Use: Never used   Substance and Sexual Activity    Alcohol use: No    Drug use: No    Sexual activity: Yes     Partners: Male     Birth control/protection: Post-menopausal           Objective   Physical Exam  Vitals and nursing note reviewed.   Constitutional:       General: She is not in acute distress.     Appearance: Normal appearance.      Comments: She is mildly lethargic.  She answers questions appropriately but when not engaged in conversation she lies with her eyes closed   HENT:      Head: Normocephalic and atraumatic.      Nose: Nose normal. No congestion or rhinorrhea.   Eyes:      General: No scleral icterus.     Conjunctiva/sclera: Conjunctivae normal.   Neck:      Comments: No JVD   Cardiovascular:      Rate and Rhythm: Normal rate and regular rhythm.      Heart sounds: No murmur  heard.     No friction rub.   Pulmonary:      Effort: Pulmonary effort is normal.      Breath sounds: Normal breath sounds. No wheezing or rales.   Abdominal:      General: Bowel sounds are normal.      Palpations: Abdomen is soft.      Tenderness: There is no abdominal tenderness. There is no guarding or rebound.   Musculoskeletal:         General: No tenderness.      Cervical back: Normal range of motion and neck supple.      Right lower leg: No edema.      Left lower leg: No edema.   Skin:     General: Skin is warm and dry.      Coloration: Skin is not pale.      Findings: No erythema.   Neurological:      General: No focal deficit present.      Mental Status: She is confused.      Motor: No weakness.      Coordination: Coordination normal.      Comments: She is unable to tell me the date although knows that she is in a hospital   Psychiatric:         Mood and Affect: Mood normal.         Behavior: Behavior normal.         Thought Content: Thought content normal.         Procedures           ED Course  ED Course as of 01/10/24 0628   Tue Jan 09, 2024   1255 I saw her emergently in the CT scan as a result of a code stroke page.  I reviewed her CT head and is negative for bleed or acute problem.  Have conferred with the stroke navigator.  We have proceeded with perfusion study and angiograms.  Exam nonfocal, we agree she is not a candidate for consideration of thrombolysis. [DT]   1424 Family is at bedside.  This includes 2 daughters.  They told me that when her son found her at home she was unresponsive .  They feel she is currently not confused and at her baseline.  They confirm history of several days of upper respiratory symptoms.  Sounds like syncope or seizure. [DT]   1504 COVID and flu swabs are negative. [DT]   1505 She is awake and alert.  Neck supple.  Lung exam now shows wheezing.  Daughter tells me she appeared today like she did after her last seizure which was several months ago.  She has been on  Keppra in the past.  Her daughter tells me she did not have any suspicious episodes while on Keppra but since stopping Keppra has had a couple of episodes they felt were seizures.  Will put her back on Keppra.  I reminded her of state law for admitting her from driving for 90 days.  Will treat her COPD exacerbation with Zithromax and prednisone.  Will write a month supply of Keppra and refer her to [facility] neurology.  I told her she would also need to see her primary care provider in the interim.  She tells me she prefers the liquid Keppra which I will prescribe [DT]      ED Course User Index  [DT] Shlomo Patel MD                          Total (NIH Stroke Scale): 5                  Medical Decision Making  Problems Addressed:  COPD exacerbation: complicated acute illness or injury  Generalized seizure: complicated acute illness or injury  Syncope and collapse: complicated acute illness or injury    Amount and/or Complexity of Data Reviewed  Labs: ordered.  Radiology: ordered.  ECG/medicine tests: ordered.    Risk  Prescription drug management.        Final diagnoses:   Syncope and collapse   COPD exacerbation   Generalized seizure       ED Disposition  ED Disposition       ED Disposition   Discharge    Condition   Stable    Comment   --               Coby Ely PA-C  [address] [id number]  [phone]          Carolina Tello MD  [address] [facility] [address]  [facility] [address] [phone]               Medication List        New Prescriptions      azithromycin 250 MG tablet  Commonly known as: ZITHROMAX  Take 1 tablet by mouth Daily. Take 1 pill daily for 4 days starting tomorrow     benzonatate 200 MG capsule  Commonly known as: TESSALON  Take 1 capsule by mouth 3 (Three) Times a Day As Needed for Cough.     levETIRAcetam 100 MG/ML solution  Commonly known as: Keppra  Take 5 mL by mouth 2 (Two) Times a Day.     predniSONE 20 MG tablet  Commonly known as: DELTASONE  2  pills daily for 5 days            Changed      topiramate 25 MG tablet  Commonly known as: TOPAMAX  TAKE ONE TABLET BY MOUTH ONCE NIGHTLY FOR 7 DAYS THEN TAKE 1 TABLET BY MOUTH TWICE A DAY FOR 7 DAYS THEN TAKE ONE TABLET BY MOUTH EVERY MORNING AND 2 TABLETS AT NIGHT FOR 7 DAYS  What changed:   how much to take  how to take this  when to take this               Where to Get Your Medications        These medications were sent to [facility] [id number] - [place] [address] [address] AT [facility] - [phone]  - [phone]   [address], [facility] [address]      Phone: [phone]   azithromycin 250 MG tablet  benzonatate 200 MG capsule  levETIRAcetam 100 MG/ML solution  predniSONE 20 MG tablet            Shlomo Patel MD  01/10/24 0628

## 2024-01-10 LAB
QT INTERVAL: 422 MS
QTC INTERVAL: 455 MS

## 2024-01-29 RX ORDER — TOPIRAMATE 25 MG/1
TABLET ORAL
Qty: 42 TABLET | Refills: 3 | OUTPATIENT
Start: 2024-01-29

## 2024-01-29 NOTE — TELEPHONE ENCOUNTER
Rx Refill Note  Requested Prescriptions     Pending Prescriptions Disp Refills    topiramate (TOPAMAX) 25 MG tablet [Pharmacy Med Name: TOPIRAMATE 25 MG TABLET] 42 tablet 3     Sig: TAKE ONE TABLET BY MOUTH ONCE NIGHTLY FOR 7 DAYS, THEN TAKE 1 TABLET BY MOUTH TWICE A DAY FOR 7 DAYS, THEN TAKE ONE TABLET BY MOUTH EVERY MORNING AND TAKE TWO TABLETS BY MOUTH ONCE NIGHTLY FOR 7 DAYS      Last filled:10/16/23  Last office visit with prescribing clinician: 9/28/2023      Next office visit with prescribing clinician: 2/1/2024     Yuliya Gorman MA  01/29/24, 10:31 EST

## 2024-01-29 NOTE — TELEPHONE ENCOUNTER
She is supposed to be on a dose of Topamax 50 mg twice daily therefore this prescription is not valid anymore.

## 2024-02-14 ENCOUNTER — TELEPHONE (OUTPATIENT)
Dept: CARDIOLOGY | Facility: CLINIC | Age: 54
End: 2024-02-14

## 2024-04-02 ENCOUNTER — APPOINTMENT (OUTPATIENT)
Dept: GENERAL RADIOLOGY | Facility: HOSPITAL | Age: 54
DRG: 077 | End: 2024-04-02
Payer: MEDICARE

## 2024-04-02 ENCOUNTER — HOSPITAL ENCOUNTER (INPATIENT)
Facility: HOSPITAL | Age: 54
LOS: 6 days | Discharge: HOME | DRG: 077 | End: 2024-04-08
Attending: EMERGENCY MEDICINE | Admitting: HOSPITALIST
Payer: MEDICARE

## 2024-04-02 ENCOUNTER — APPOINTMENT (OUTPATIENT)
Dept: CT IMAGING | Facility: HOSPITAL | Age: 54
DRG: 077 | End: 2024-04-02
Payer: MEDICARE

## 2024-04-02 DIAGNOSIS — I10 PRIMARY HYPERTENSION: ICD-10-CM

## 2024-04-02 DIAGNOSIS — J96.01 ACUTE RESPIRATORY FAILURE WITH HYPOXIA: Primary | ICD-10-CM

## 2024-04-02 DIAGNOSIS — Z87.898 HISTORY OF PSYCHOGENIC NONEPILEPTIC SEIZURE: ICD-10-CM

## 2024-04-02 DIAGNOSIS — R13.10 DYSPHAGIA, UNSPECIFIED TYPE: ICD-10-CM

## 2024-04-02 DIAGNOSIS — I16.1 HYPERTENSIVE EMERGENCY: ICD-10-CM

## 2024-04-02 DIAGNOSIS — R41.82 ALTERED MENTAL STATUS, UNSPECIFIED ALTERED MENTAL STATUS TYPE: ICD-10-CM

## 2024-04-02 PROBLEM — G93.40 ENCEPHALOPATHY ACUTE: Status: ACTIVE | Noted: 2024-04-02

## 2024-04-02 PROBLEM — J96.02 ACUTE RESPIRATORY FAILURE WITH HYPERCAPNIA: Status: ACTIVE | Noted: 2024-04-02

## 2024-04-02 LAB
ALBUMIN SERPL-MCNC: 4.2 G/DL (ref 3.5–5.2)
ALBUMIN/GLOB SERPL: 1.6 G/DL
ALP SERPL-CCNC: 106 U/L (ref 39–117)
ALT SERPL W P-5'-P-CCNC: 11 U/L (ref 1–33)
AMPHET+METHAMPHET UR QL: NEGATIVE
AMPHETAMINES UR QL: NEGATIVE
ANION GAP SERPL CALCULATED.3IONS-SCNC: 9 MMOL/L (ref 5–15)
APAP SERPL-MCNC: <5 MCG/ML (ref 0–30)
ARTERIAL PATENCY WRIST A: ABNORMAL
AST SERPL-CCNC: 13 U/L (ref 1–32)
ATMOSPHERIC PRESS: ABNORMAL MM[HG]
BARBITURATES UR QL SCN: NEGATIVE
BASE EXCESS BLDA CALC-SCNC: -5.6 MMOL/L (ref 0–2)
BASOPHILS # BLD AUTO: 0.05 10*3/MM3 (ref 0–0.2)
BASOPHILS NFR BLD AUTO: 0.8 % (ref 0–1.5)
BDY SITE: ABNORMAL
BENZODIAZ UR QL SCN: NEGATIVE
BILIRUB SERPL-MCNC: 0.3 MG/DL (ref 0–1.2)
BILIRUB UR QL STRIP: NEGATIVE
BODY TEMPERATURE: 37
BUN BLDA-MCNC: 16 MG/DL (ref 8–26)
BUN SERPL-MCNC: 15 MG/DL (ref 6–20)
BUN/CREAT SERPL: 18.3 (ref 7–25)
BUPRENORPHINE SERPL-MCNC: NEGATIVE NG/ML
CA-I BLDA-SCNC: 1.24 MMOL/L (ref 1.2–1.32)
CALCIUM SPEC-SCNC: 9.3 MG/DL (ref 8.6–10.5)
CANNABINOIDS SERPL QL: NEGATIVE
CHLORIDE BLDA-SCNC: 107 MMOL/L (ref 98–109)
CHLORIDE SERPL-SCNC: 104 MMOL/L (ref 98–107)
CK SERPL-CCNC: 52 U/L (ref 20–180)
CLARITY UR: CLEAR
CO2 BLDA-SCNC: 22 MMOL/L (ref 24–29)
CO2 BLDA-SCNC: 23.5 MMOL/L (ref 22–33)
CO2 SERPL-SCNC: 23 MMOL/L (ref 22–29)
COCAINE UR QL: NEGATIVE
COHGB MFR BLD: 3.2 % (ref 0–2)
COLOR UR: YELLOW
CREAT BLDA-MCNC: 1 MG/DL (ref 0.6–1.3)
CREAT SERPL-MCNC: 0.82 MG/DL (ref 0.57–1)
D-LACTATE SERPL-SCNC: 1.2 MMOL/L (ref 0.5–2)
DEPRECATED RDW RBC AUTO: 44.5 FL (ref 37–54)
EGFRCR SERPLBLD CKD-EPI 2021: 67.9 ML/MIN/1.73
EGFRCR SERPLBLD CKD-EPI 2021: 86.2 ML/MIN/1.73
EOSINOPHIL # BLD AUTO: 0.21 10*3/MM3 (ref 0–0.4)
EOSINOPHIL NFR BLD AUTO: 3.3 % (ref 0.3–6.2)
EPAP: 0
ERYTHROCYTE [DISTWIDTH] IN BLOOD BY AUTOMATED COUNT: 13.4 % (ref 12.3–15.4)
ETHANOL BLD-MCNC: <10 MG/DL (ref 0–10)
FENTANYL UR-MCNC: NEGATIVE NG/ML
GLOBULIN UR ELPH-MCNC: 2.7 GM/DL
GLUCOSE BLDC GLUCOMTR-MCNC: 100 MG/DL (ref 70–130)
GLUCOSE BLDC GLUCOMTR-MCNC: 195 MG/DL (ref 70–130)
GLUCOSE SERPL-MCNC: 104 MG/DL (ref 65–99)
GLUCOSE UR STRIP-MCNC: NEGATIVE MG/DL
HCO3 BLDA-SCNC: 22 MMOL/L (ref 20–26)
HCT VFR BLD AUTO: 44.5 % (ref 34–46.6)
HCT VFR BLD CALC: 49.6 % (ref 38–51)
HCT VFR BLDA CALC: 43 % (ref 38–51)
HGB BLD-MCNC: 15.3 G/DL (ref 12–15.9)
HGB BLDA-MCNC: 14.6 G/DL (ref 12–17)
HGB BLDA-MCNC: 16.2 G/DL (ref 14–18)
HGB UR QL STRIP.AUTO: NEGATIVE
HOLD SPECIMEN: NORMAL
IMM GRANULOCYTES # BLD AUTO: 0.01 10*3/MM3 (ref 0–0.05)
IMM GRANULOCYTES NFR BLD AUTO: 0.2 % (ref 0–0.5)
INHALED O2 CONCENTRATION: 100 %
INR PPP: 1.3 (ref 0.8–1.2)
IPAP: 0
KETONES UR QL STRIP: NEGATIVE
LEUKOCYTE ESTERASE UR QL STRIP.AUTO: NEGATIVE
LIPASE SERPL-CCNC: 43 U/L (ref 13–60)
LYMPHOCYTES # BLD AUTO: 2.67 10*3/MM3 (ref 0.7–3.1)
LYMPHOCYTES NFR BLD AUTO: 42.2 % (ref 19.6–45.3)
MAGNESIUM SERPL-MCNC: 2.1 MG/DL (ref 1.6–2.6)
MCH RBC QN AUTO: 31 PG (ref 26.6–33)
MCHC RBC AUTO-ENTMCNC: 34.4 G/DL (ref 31.5–35.7)
MCV RBC AUTO: 90.1 FL (ref 79–97)
METHADONE UR QL SCN: NEGATIVE
METHGB BLD QL: 0.4 % (ref 0–1.5)
MODALITY: ABNORMAL
MONOCYTES # BLD AUTO: 0.59 10*3/MM3 (ref 0.1–0.9)
MONOCYTES NFR BLD AUTO: 9.3 % (ref 5–12)
NEUTROPHILS NFR BLD AUTO: 2.8 10*3/MM3 (ref 1.7–7)
NEUTROPHILS NFR BLD AUTO: 44.2 % (ref 42.7–76)
NITRITE UR QL STRIP: NEGATIVE
NRBC BLD AUTO-RTO: 0 /100 WBC (ref 0–0.2)
OPIATES UR QL: NEGATIVE
OXYCODONE UR QL SCN: NEGATIVE
OXYHGB MFR BLDV: 96.4 % (ref 94–99)
PAW @ PEAK INSP FLOW SETTING VENT: 0 CMH2O
PCO2 BLDA: 49.3 MM HG (ref 35–45)
PCO2 TEMP ADJ BLD: 49.3 MM HG (ref 35–45)
PCP UR QL SCN: NEGATIVE
PEEP RESPIRATORY: 5 CM[H2O]
PH BLDA: 7.26 PH UNITS (ref 7.35–7.45)
PH UR STRIP.AUTO: 6 [PH] (ref 5–8)
PH, TEMP CORRECTED: 7.26 PH UNITS
PLATELET # BLD AUTO: 186 10*3/MM3 (ref 140–450)
PMV BLD AUTO: 11.4 FL (ref 6–12)
PO2 BLDA: 228 MM HG (ref 83–108)
PO2 TEMP ADJ BLD: 228 MM HG (ref 83–108)
POTASSIUM BLDA-SCNC: 3.7 MMOL/L (ref 3.5–4.9)
POTASSIUM SERPL-SCNC: 3.8 MMOL/L (ref 3.5–5.2)
PROCALCITONIN SERPL-MCNC: 0.04 NG/ML (ref 0–0.25)
PROT SERPL-MCNC: 6.9 G/DL (ref 6–8.5)
PROT UR QL STRIP: NEGATIVE
PROTHROMBIN TIME: 15.6 SECONDS (ref 12.8–15.2)
RBC # BLD AUTO: 4.94 10*6/MM3 (ref 3.77–5.28)
SALICYLATES SERPL-MCNC: <0.3 MG/DL
SODIUM BLD-SCNC: 142 MMOL/L (ref 138–146)
SODIUM SERPL-SCNC: 136 MMOL/L (ref 136–145)
SP GR UR STRIP: 1.02 (ref 1–1.03)
T4 FREE SERPL-MCNC: 0.83 NG/DL (ref 0.92–1.68)
TOTAL RATE: 14 BREATHS/MINUTE
TRICYCLICS UR QL SCN: NEGATIVE
TROPONIN T SERPL HS-MCNC: <6 NG/L
TSH SERPL DL<=0.05 MIU/L-ACNC: 1.85 UIU/ML (ref 0.27–4.2)
UROBILINOGEN UR QL STRIP: NORMAL
VENTILATOR MODE: ABNORMAL
VT ON VENT VENT: 0.45 ML
WBC NRBC COR # BLD AUTO: 6.33 10*3/MM3 (ref 3.4–10.8)
WHOLE BLOOD HOLD COAG: NORMAL
WHOLE BLOOD HOLD SPECIMEN: NORMAL

## 2024-04-02 PROCEDURE — 85610 PROTHROMBIN TIME: CPT

## 2024-04-02 PROCEDURE — 51702 INSERT TEMP BLADDER CATH: CPT

## 2024-04-02 PROCEDURE — 0BH17EZ INSERTION OF ENDOTRACHEAL AIRWAY INTO TRACHEA, VIA NATURAL OR ARTIFICIAL OPENING: ICD-10-PCS | Performed by: EMERGENCY MEDICINE

## 2024-04-02 PROCEDURE — 84439 ASSAY OF FREE THYROXINE: CPT | Performed by: EMERGENCY MEDICINE

## 2024-04-02 PROCEDURE — 71045 X-RAY EXAM CHEST 1 VIEW: CPT

## 2024-04-02 PROCEDURE — 82550 ASSAY OF CK (CPK): CPT | Performed by: EMERGENCY MEDICINE

## 2024-04-02 PROCEDURE — 94799 UNLISTED PULMONARY SVC/PX: CPT

## 2024-04-02 PROCEDURE — 80307 DRUG TEST PRSMV CHEM ANLYZR: CPT | Performed by: EMERGENCY MEDICINE

## 2024-04-02 PROCEDURE — 99285 EMERGENCY DEPT VISIT HI MDM: CPT

## 2024-04-02 PROCEDURE — 99222 1ST HOSP IP/OBS MODERATE 55: CPT | Performed by: NURSE PRACTITIONER

## 2024-04-02 PROCEDURE — 70450 CT HEAD/BRAIN W/O DYE: CPT

## 2024-04-02 PROCEDURE — 82077 ASSAY SPEC XCP UR&BREATH IA: CPT | Performed by: EMERGENCY MEDICINE

## 2024-04-02 PROCEDURE — 36600 WITHDRAWAL OF ARTERIAL BLOOD: CPT

## 2024-04-02 PROCEDURE — 85014 HEMATOCRIT: CPT

## 2024-04-02 PROCEDURE — 83050 HGB METHEMOGLOBIN QUAN: CPT

## 2024-04-02 PROCEDURE — 25010000002 ONDANSETRON PER 1 MG: Performed by: NURSE PRACTITIONER

## 2024-04-02 PROCEDURE — 0042T HC CT CEREBRAL PERFUSION W/WO CONTRAST: CPT

## 2024-04-02 PROCEDURE — 94002 VENT MGMT INPAT INIT DAY: CPT

## 2024-04-02 PROCEDURE — 83735 ASSAY OF MAGNESIUM: CPT | Performed by: EMERGENCY MEDICINE

## 2024-04-02 PROCEDURE — 25010000002 FENTANYL 10 MCG/1 ML NS: Performed by: INTERNAL MEDICINE

## 2024-04-02 PROCEDURE — 25010000002 PROPOFOL 10 MG/ML EMULSION: Performed by: EMERGENCY MEDICINE

## 2024-04-02 PROCEDURE — 31500 INSERT EMERGENCY AIRWAY: CPT

## 2024-04-02 PROCEDURE — 80053 COMPREHEN METABOLIC PANEL: CPT | Performed by: EMERGENCY MEDICINE

## 2024-04-02 PROCEDURE — 25510000001 IOPAMIDOL PER 1 ML: Performed by: EMERGENCY MEDICINE

## 2024-04-02 PROCEDURE — 81003 URINALYSIS AUTO W/O SCOPE: CPT | Performed by: EMERGENCY MEDICINE

## 2024-04-02 PROCEDURE — 0 MIDAZOLAM 1 MG/ML 100ML NS 100 MG/100ML SOLUTION: Performed by: EMERGENCY MEDICINE

## 2024-04-02 PROCEDURE — 25010000002 LEVETRIRACETAM PER 10 MG: Performed by: EMERGENCY MEDICINE

## 2024-04-02 PROCEDURE — 84484 ASSAY OF TROPONIN QUANT: CPT | Performed by: EMERGENCY MEDICINE

## 2024-04-02 PROCEDURE — 70498 CT ANGIOGRAPHY NECK: CPT

## 2024-04-02 PROCEDURE — 82375 ASSAY CARBOXYHB QUANT: CPT

## 2024-04-02 PROCEDURE — 80047 BASIC METABLC PNL IONIZED CA: CPT

## 2024-04-02 PROCEDURE — 83690 ASSAY OF LIPASE: CPT | Performed by: INTERNAL MEDICINE

## 2024-04-02 PROCEDURE — 85025 COMPLETE CBC W/AUTO DIFF WBC: CPT | Performed by: EMERGENCY MEDICINE

## 2024-04-02 PROCEDURE — 74018 RADEX ABDOMEN 1 VIEW: CPT

## 2024-04-02 PROCEDURE — 36415 COLL VENOUS BLD VENIPUNCTURE: CPT

## 2024-04-02 PROCEDURE — 80179 DRUG ASSAY SALICYLATE: CPT | Performed by: EMERGENCY MEDICINE

## 2024-04-02 PROCEDURE — 84145 PROCALCITONIN (PCT): CPT | Performed by: INTERNAL MEDICINE

## 2024-04-02 PROCEDURE — 93005 ELECTROCARDIOGRAM TRACING: CPT | Performed by: EMERGENCY MEDICINE

## 2024-04-02 PROCEDURE — 80143 DRUG ASSAY ACETAMINOPHEN: CPT | Performed by: EMERGENCY MEDICINE

## 2024-04-02 PROCEDURE — 83605 ASSAY OF LACTIC ACID: CPT | Performed by: EMERGENCY MEDICINE

## 2024-04-02 PROCEDURE — 5A1945Z RESPIRATORY VENTILATION, 24-96 CONSECUTIVE HOURS: ICD-10-PCS | Performed by: EMERGENCY MEDICINE

## 2024-04-02 PROCEDURE — 84443 ASSAY THYROID STIM HORMONE: CPT | Performed by: EMERGENCY MEDICINE

## 2024-04-02 PROCEDURE — 82948 REAGENT STRIP/BLOOD GLUCOSE: CPT

## 2024-04-02 PROCEDURE — 70496 CT ANGIOGRAPHY HEAD: CPT

## 2024-04-02 PROCEDURE — 82805 BLOOD GASES W/O2 SATURATION: CPT

## 2024-04-02 PROCEDURE — 99291 CRITICAL CARE FIRST HOUR: CPT | Performed by: INTERNAL MEDICINE

## 2024-04-02 RX ORDER — SODIUM CHLORIDE 0.9 % (FLUSH) 0.9 %
10 SYRINGE (ML) INJECTION AS NEEDED
Status: DISCONTINUED | OUTPATIENT
Start: 2024-04-02 | End: 2024-04-06

## 2024-04-02 RX ORDER — AMOXICILLIN 250 MG
2 CAPSULE ORAL 2 TIMES DAILY
Status: DISCONTINUED | OUTPATIENT
Start: 2024-04-02 | End: 2024-04-03

## 2024-04-02 RX ORDER — SODIUM CHLORIDE 0.9 % (FLUSH) 0.9 %
10 SYRINGE (ML) INJECTION EVERY 12 HOURS SCHEDULED
Status: DISCONTINUED | OUTPATIENT
Start: 2024-04-02 | End: 2024-04-08 | Stop reason: HOSPADM

## 2024-04-02 RX ORDER — SODIUM CHLORIDE 9 MG/ML
40 INJECTION, SOLUTION INTRAVENOUS AS NEEDED
Status: DISCONTINUED | OUTPATIENT
Start: 2024-04-02 | End: 2024-04-06

## 2024-04-02 RX ORDER — ROCURONIUM BROMIDE 10 MG/ML
INJECTION, SOLUTION INTRAVENOUS
Status: COMPLETED | OUTPATIENT
Start: 2024-04-02 | End: 2024-04-02

## 2024-04-02 RX ORDER — ONDANSETRON 2 MG/ML
4 INJECTION INTRAMUSCULAR; INTRAVENOUS EVERY 6 HOURS PRN
Status: DISCONTINUED | OUTPATIENT
Start: 2024-04-02 | End: 2024-04-08 | Stop reason: HOSPADM

## 2024-04-02 RX ORDER — PROPOFOL 10 MG/ML
VIAL (ML) INTRAVENOUS
Status: ACTIVE
Start: 2024-04-02 | End: 2024-04-03

## 2024-04-02 RX ORDER — BISACODYL 10 MG
10 SUPPOSITORY, RECTAL RECTAL DAILY PRN
Status: DISCONTINUED | OUTPATIENT
Start: 2024-04-02 | End: 2024-04-03

## 2024-04-02 RX ORDER — BISACODYL 5 MG/1
5 TABLET, DELAYED RELEASE ORAL DAILY PRN
Status: DISCONTINUED | OUTPATIENT
Start: 2024-04-02 | End: 2024-04-03

## 2024-04-02 RX ORDER — PROPOFOL 10 MG/ML
VIAL (ML) INTRAVENOUS
Status: COMPLETED | OUTPATIENT
Start: 2024-04-02 | End: 2024-04-02

## 2024-04-02 RX ORDER — POLYETHYLENE GLYCOL 3350 17 G/17G
17 POWDER, FOR SOLUTION ORAL DAILY PRN
Status: DISCONTINUED | OUTPATIENT
Start: 2024-04-02 | End: 2024-04-03

## 2024-04-02 RX ORDER — MIDAZOLAM IN NACL,ISO-OSMOT/PF 50 MG/50ML
1-10 INFUSION BOTTLE (ML) INTRAVENOUS
Status: DISCONTINUED | OUTPATIENT
Start: 2024-04-02 | End: 2024-04-03

## 2024-04-02 RX ORDER — LEVETIRACETAM 500 MG/5ML
1500 INJECTION, SOLUTION, CONCENTRATE INTRAVENOUS ONCE
Status: COMPLETED | OUTPATIENT
Start: 2024-04-02 | End: 2024-04-02

## 2024-04-02 RX ORDER — NITROGLYCERIN 0.4 MG/1
0.4 TABLET SUBLINGUAL
Status: DISCONTINUED | OUTPATIENT
Start: 2024-04-02 | End: 2024-04-08 | Stop reason: HOSPADM

## 2024-04-02 RX ADMIN — IOPAMIDOL 115 ML: 755 INJECTION, SOLUTION INTRAVENOUS at 15:48

## 2024-04-02 RX ADMIN — Medication 100 MCG/HR: at 17:30

## 2024-04-02 RX ADMIN — LEVETIRACETAM 1500 MG: 100 INJECTION, SOLUTION INTRAVENOUS at 15:54

## 2024-04-02 RX ADMIN — MIDAZOLAM HYDROCHLORIDE 1 MG/HR: 1 INJECTION, SOLUTION INTRAVENOUS at 15:13

## 2024-04-02 RX ADMIN — ROCURONIUM BROMIDE 100 MG: 10 SOLUTION INTRAVENOUS at 15:05

## 2024-04-02 RX ADMIN — ONDANSETRON 4 MG: 2 INJECTION INTRAMUSCULAR; INTRAVENOUS at 20:56

## 2024-04-02 RX ADMIN — Medication 10 ML: at 20:22

## 2024-04-02 RX ADMIN — PROPOFOL 100 MG: 10 INJECTION, EMULSION INTRAVENOUS at 15:05

## 2024-04-02 NOTE — CONSULTS
"Stroke Consult Note    Patient Name: Arcelia Quintero   MRN: [id number]  Age: 53 y.o.  Sex: female  : 1970    Primary Care Physician: Coby Ely PA-C  Referring Physician: Dr. Garcia    TIME STROKE TEAM CALLED: 15:28 EST     TIME PATIENT SEEN: 15:35 EST    Handedness: Right  Race:      Chief Complaint/Reason for Consultation: Unresponsiveness    Subjective .  HPI: Arcelia Quintero is a 53-year-old, , right-handed female with known diagnosis of HTN, HLD, migraines, conversion disorder, history of seizure disorder including nonepileptic seizures, history of brain tumor s/p resection in  (data deficient, no evidence of this on CT head), COPD, stage III CKD, elevated liver enzymes, and current everyday smoker who presented with unresponsiveness.  Apparently patient was last known well around 1400 when she told her daughter she did not feel well and went to lay down.  When her daughter went to check on her she was found completely unresponsive for which her daughter called EMS.  Upon arrival to [facility] patient reportedly had a GCS of 6 and was completely unresponsive to painful stimuli, unresponsive to sternal rub.  Initial SBP  > 250.  Per ED provider she periodically opened her eyes and looked around the room, she was able to move all extremities but not to command, she did speak saying \"I do not feel well,\" before becoming unresponsive once again.  This happened at least 3 times.  Patient was intubated for airway protection, she was not hypoxic.  Patient was taken for stat CT head and we were asked to see the patient in consult.    On exam patient remains intubated and sedated on a Versed drip.  Her pupils are round and reactive.  No corneal response.  No response to nailbed pressure in any extremity.  Negative babinski.  Patient's daughter is not present but I was able to speak with her boyfriend.  Patient has apparently been complaining of feeling unwell for several days and has been " "having periods of, \"blacking out,\" at one point she was found on the floor and did not know how she got there.  He is not sure of what medication she is on but knows that she does not take them regularly.  She follows with Dr. 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  She was taking Topamax for headaches.  Per documentation review patient has had several admissions with a similar presentation and hypertensive emergency in the past.    Last Known Normal Date/Time: estimated 1400    Review of Systems   Unable to perform ROS: Intubated      Past Medical History:   Diagnosis Date    Abnormal Pap smear of cervix     Allergic rhinitis     Asthma     Atypical chest pain     Brain tumor     Status post brain tumor resection in 1989.    Candidiasis of mouth     Cervical high risk HPV (human papillomavirus) test positive 09/01/2020    CKD (chronic kidney disease), stage III     Colon polyps 09/09/2020    sessile and tubular adenoma    Conversion disorder     COPD (chronic obstructive pulmonary disease)     COVID-19     Elevated liver enzymes     Former smoker 07/05/2023    H/O Helicobacter infection      Status post EGD 9/4/2012, pathology revealed H. pylori infection.    Headache     migraines    History of Guillain-Pesotum syndrome due to influenza immunization     Neurology evaluation thought it was numbness from carpal tunnel and not Guillain-Pesotum    Hyperlipidemia     Hypertension     Influenza     Internal hemorrhoids     Low grade squamous intraepith lesion on cytologic smear cervix (lgsil) 09/01/2020    Median neuropathy     Migraine     PTSD (post-traumatic stress disorder)     Seizures     2 seizures after surgery    Small fiber neuropathy     Stroke     Tinea corporis      Past Surgical History:   Procedure Laterality Date    BRAIN SURGERY      status post brain tumor " resection    CARDIAC CATHETERIZATION N/A 2023    Procedure: Left Heart Cath;  Surgeon: Ryder Delarosa III, MD;  Location: [facility] CATH INVASIVE LOCATION;  Service: Cardiovascular;  Laterality: N/A;    COLONOSCOPY      polyp removal    LIVER BIOPSY      UPPER GASTROINTESTINAL ENDOSCOPY      Status post EGD 2012, pathology revealed H. pylori infection.     Family History   Adopted: Yes   Problem Relation Age of Onset    Lung disease Mother     Heart disease Mother     Stroke Mother     Throat cancer Mother     Uterine cancer Mother     Ovarian cancer Mother     Prostate cancer Father     No Known Problems Sister     No Known Problems Brother     Breast cancer Other     Diabetes Other     Colon cancer Other      Social History     Socioeconomic History    Marital status: Legally    Tobacco Use    Smoking status: Every Day     Current packs/day: 0.00     Average packs/day: 0.5 packs/day for 41.7 years (20.9 ttl pk-yrs)     Types: Cigarettes     Start date: 1981     Last attempt to quit: 2022     Years since quittin.5     Passive exposure: Current    Smokeless tobacco: Never    Tobacco comments:     quit with chantix in past   Vaping Use    Vaping status: Never Used   Substance and Sexual Activity    Alcohol use: No    Drug use: No    Sexual activity: Yes     Partners: Male     Birth control/protection: Post-menopausal     Allergies   Allergen Reactions    Codeine Hives    Influenza Vaccines Other (See Comments)     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 Authorizing Provider   amLODIPine (NORVASC) 5 MG tablet Take 1 tablet by mouth Daily. 23   Coby Ely PA-C   aspirin 81 MG EC tablet Take 1 tablet by mouth Daily. 23   Karol Fitch APRN   atorvastatin (LIPITOR) 40 MG tablet Take 1 tablet by mouth Daily. 23    Karol Fitch APRN   azithromycin (Zithromax Z-Jeremy) 250 MG tablet Take 2 tablets by mouth on day 1, then 1 tablet daily on days 2-5 2/29/24   Elliott Nam MD   benzonatate (TESSALON) 200 MG capsule Take 1 capsule by mouth 3 (Three) Times a Day As Needed for Cough. 1/9/24   Shlomo Patel MD   Budeson-Glycopyrrol-Formoterol (BREZTRI) 160-9-4.8 MCG/ACT aerosol inhaler Inhale 2 puffs 2 (Two) Times a Day. 11/6/23   Coby Ely PA-C   carvedilol (COREG) 12.5 MG tablet Take 1 tablet by mouth 2 (Two) Times a Day With Meals. 10/9/23   Coby Ely PA-C   FLUoxetine (PROzac) 20 MG capsule Take 1 capsule by mouth Daily. 11/20/23   Mary Canales APRN   lactulose (CHRONULAC) 10 GM/15ML solution Take 45 mL by mouth 2 (Two) Times a Day. 8/17/23   Denis Navarro MD   levETIRAcetam (Keppra) 100 MG/ML solution Take 5 mL by mouth 2 (Two) Times a Day. 1/9/24   Shlomo Patel MD   linaclotide (Linzess) 145 MCG capsule capsule Take 1 capsule by mouth Every Morning Before Breakfast. 9/22/23   Kelby Sawant APRN   methylPREDNISolone (MEDROL) 4 MG dose pack Take as directed on package instructions. 2/29/24   Elliott Nam MD   mirtazapine (REMERON) 7.5 MG tablet Take 2 tablets by mouth Every Night. 11/20/23   Mary Canales APRN   pantoprazole (PROTONIX) 40 MG EC tablet TAKE 1 TABLET BY MOUTH DAILY 11/17/23   Coby Ely PA-C   rizatriptan (Maxalt) 10 MG tablet Take 1 tablet at onset of headache.  May repeat once in 2 hours.  Do not take more than 2 tabs a day or 8 tabs a month 9/28/23   Carolina Tello MD   topiramate (TOPAMAX) 25 MG tablet TAKE ONE TABLET BY MOUTH ONCE NIGHTLY FOR 7 DAYS THEN TAKE 1 TABLET BY MOUTH TWICE A DAY FOR 7 DAYS THEN TAKE ONE TABLET BY MOUTH EVERY MORNING AND 2 TABLETS AT NIGHT FOR 7 DAYS  Patient taking differently: Take 1 tablet by mouth 2 (Two) Times a Day. TAKE ONE TABLET BY MOUTH ONCE NIGHTLY FOR 7 DAYS THEN TAKE 1 TABLET  BY MOUTH TWICE A DAY FOR 7 DAYS THEN TAKE ONE TABLET BY MOUTH EVERY MORNING AND 2 TABLETS AT NIGHT FOR 7 DAYS 10/16/23   Carolina Tello MD             Objective     Temp:  [97.4 °F (36.3 °C)] 97.4 °F (36.3 °C)  Heart Rate:  [] 118  Resp:  [16] 16  BP: (217-250)/(119-166) 219/142  FiO2 (%):  [100 %] 100 %  Neurological Exam  Mental Status  Unresponsive to painful stimuli. Patient is nonverbal. Language: intubated.    Cranial Nerves  CN II: No response to visual threat.  CN III, IV, VI: Pupils equal round and reactive to light bilaterally.  CN V:  Right: Absent corneal reflex.  Left: Absent corneal reflex.  CN VII: ETT.  CN IX, X:  Right: Gag is reduced.  CN XI: Shoulder shrug strength is normal.    Motor  Normal muscle bulk throughout. Normal muscle tone. Strength is 5/5 in all four extremities except as noted.  0/5 x 4.    Sensory  Pinprick abnormality:     Reflexes  Right Plantar: mute  Left Plantar: mute    Gait    Not tested.      Physical Exam  Vitals reviewed.   Constitutional:       Interventions: She is sedated and intubated.   HENT:      Head: Normocephalic and atraumatic.   Eyes:      Pupils: Pupils are equal, round, and reactive to light.   Cardiovascular:      Rate and Rhythm: Regular rhythm. Tachycardia present.   Pulmonary:      Effort: Pulmonary effort is normal. She is intubated.      Comments: intubated  Musculoskeletal:         General: No swelling.      Cervical back: Normal range of motion.   Skin:     General: Skin is warm and dry.   Neurological:      Sensory: No sensory deficit.      Motor: Weakness present.         Acute Stroke Data    IV Thrombolytic (TPA/Tenecteplase) Inclusion / Exclusion Criteria    Time: 16:11 EDT  Person Administering Scale: LEANN Up    Inclusion Criteria  [x]   18 years of age or greater   []   Onset of symptoms < 4.5 hours before beginning treatment (stroke onset = time patient was last seen well or without symptoms).   []   Diagnosis of acute  ischemic stroke causing measurable disabling deficit (Complete Hemianopia, Any Aphasia, Visual or Sensory Extinction, Any weakness limiting sustained effort against gravity)   []   Any remaining deficit considered potentially disabling in view of patient and practitioner   Exclusion criteria (Do not proceed with Alteplase if any are checked under exclusion criteria)  []   Onset unknown or GREATER than 4.5 hours   []   ICH on CT/MRI   []   CT demonstrates hypodensity representing acute or subacute infarct   []   Significant head trauma or prior stroke in the previous 3 months   []   Symptoms suggestive of subarachnoid hemorrhage   []   History of un-ruptured intracranial aneurysm GREATER than 10 mm   []   Recent intracranial or intraspinal surgery within the last 3 months   []   Arterial puncture at a non-compressible site in the previous 7 days   []   Active internal bleeding   []   Acute bleeding tendency   []   Platelet count LESS than 100,000 for known hematological diseases such as leukemia, thrombocytopenia or chronic cirrhosis   []   Current use of anticoagulant with INR GREATER than 1.7 or PT GREATER than 15 seconds, aPTT GREATER than 40 seconds   []   Heparin received within 48 hours, resulting in abnormally elevated aPTT GREATER than upper limit of normal   []   Current use of direct thrombin inhibitors or direct factor Xa inhibitors in the past 48 hours   []   Elevated blood pressure refractory to treatment (systolic GREATER than 185 mm/Hg or diastolic  GREATER than 110 mm/Hg   []   Suspected infective endocarditis and aortic arch dissection   []   Current use of therapeutic treatment dose of low-molecular-weight heparin (LMWH) within the previous 24 hours   []   Structural GI malignancy or bleed   Relative exclusion for all patients  []   Only minor nondisabling symptoms   []   Pregnancy   [x]   Seizure at onset with postictal residual neurological impairments   []   Major surgery or previous trauma  within past 14 days   []   History of previous spontaneous ICH, intracranial neoplasm, or AV malformation   []   Postpartum (within previous 14 days)   []   Recent GI or urinary tract hemorrhage (within previous 21 days)   []   Recent acute MI (within previous 3 months)   []   History of unruptured intracranial aneurysm LESS than 10 mm   []   History of ruptured intracranial aneurysm   []   Blood glucose LESS than 50 mg/dL (2.7 mmol/L)   []   Dural puncture within the last 7 days   []   Known GREATER than 10 cerebral microbleeds   Additional exclusions for patients with symptoms onset between 3 and 4.5 hours.  []   Age > 80.   []   On any anticoagulants regardless of INR  >>> Warfarin (Coumadin), Heparin, Enoxaparin (Lovenox), fondaparinux (Arixtra), bivalirudin (Angiomax), Argatroban, dabigatran (Pradaxa), rivaroxaban (Xarelto), or apixaban (Eliquis)   []   Severe stroke (NIHSS > 25).   []   History of BOTH diabetes and previous ischemic stroke.   []   The risks and benefits have been discussed with the patient or family related to the administration of IV alteplase for stroke symptoms.   []   I have discussed and reviewed the patient's case and imaging with the attending prior to IV Thrombolytic (TPA/Tenecteplase).    Time Thrombolytic administered   Non focal exam    Hospital Meds:  Scheduled- senna-docusate sodium, 2 tablet, Oral, BID  sodium chloride, 10 mL, Intravenous, Q12H      Infusions- midazolam, 1-10 mg/hr, Last Rate: 1 mg/hr (04/02/24 1513)       PRNs-   senna-docusate sodium **AND** polyethylene glycol **AND** bisacodyl **AND** bisacodyl    Calcium Replacement - Follow Nurse / BPA Driven Protocol    Magnesium Standard Dose Replacement - Follow Nurse / BPA Driven Protocol    nitroglycerin    Phosphorus Replacement - Follow Nurse / BPA Driven Protocol    Potassium Replacement - Follow Nurse / BPA Driven Protocol    sodium chloride    sodium chloride    sodium chloride    Functional Status Prior to  Current Stroke/Tenzin Score: MRS 0    NIH Stroke Scale  Time: 16:11 EDT  Person Administering Scale: LEANN Up    1a  Level of consciousness: 3=responds only with reflex motor or automatic effects or totally unresponsive, flaccid, areflexic   1b. LOC questions:  1=intubated   1c. LOC commands: 2=Performs neither task correctly   2.  Best Gaze: 2=forced deviation, or total gaze paresis not overcome by oculocephalic maneuver   3.  Visual: 3=Bilateral hemianopia (blind including cortical blindness)   4. Facial Palsy: 0=Normal symmetric movement   5a.  Motor left arm: 4=No movement   5b.  Motor right arm: 4=No movement   6a. motor left le=No movement   6b  Motor right le=No movement   7. Limb Ataxia: 0=Absent   8.  Sensory: 0=Normal; no sensory loss   9. Best Language:  3=Mute, global aphasia; no usable speech or auditory comprehension   10. Dysarthria: UN= intubated   11. Extinction and Inattention: 0=No abnormality    Total:   30       Results Reviewed:  I have personally reviewed current lab, radiology, and data and agree with results.    WBC   Date Value Ref Range Status   2024 6.33 3.40 - 10.80 10*3/mm3 Final     RBC   Date Value Ref Range Status   2024 4.94 3.77 - 5.28 10*6/mm3 Final     Hemoglobin   Date Value Ref Range Status   2024 15.3 12.0 - 15.9 g/dL Final   2024 14.6 12.0 - 17.0 g/dL Final     Comment:     Serial Number: [id number]:  [id number]     Hematocrit   Date Value Ref Range Status   2024 44.5 34.0 - 46.6 % Final   2024 43 38 - 51 % Final     MCV   Date Value Ref Range Status   2024 90.1 79.0 - 97.0 fL Final     MCH   Date Value Ref Range Status   2024 31.0 26.6 - 33.0 pg Final     MCHC   Date Value Ref Range Status   2024 34.4 31.5 - 35.7 g/dL Final     RDW   Date Value Ref Range Status   2024 13.4 12.3 - 15.4 % Final     RDW-SD   Date Value Ref Range Status   2024 44.5 37.0 - 54.0 fl Final     MPV   Date Value  Ref Range Status   04/02/2024 11.4 6.0 - 12.0 fL Final     Platelets   Date Value Ref Range Status   04/02/2024 186 140 - 450 10*3/mm3 Final     Neutrophil %   Date Value Ref Range Status   04/02/2024 44.2 42.7 - 76.0 % Final     Lymphocyte %   Date Value Ref Range Status   04/02/2024 42.2 19.6 - 45.3 % Final     Monocyte %   Date Value Ref Range Status   04/02/2024 9.3 5.0 - 12.0 % Final     Eosinophil %   Date Value Ref Range Status   04/02/2024 3.3 0.3 - 6.2 % Final     Basophil %   Date Value Ref Range Status   04/02/2024 0.8 0.0 - 1.5 % Final     Immature Grans %   Date Value Ref Range Status   04/02/2024 0.2 0.0 - 0.5 % Final     Neutrophils, Absolute   Date Value Ref Range Status   04/02/2024 2.80 1.70 - 7.00 10*3/mm3 Final     Lymphocytes, Absolute   Date Value Ref Range Status   04/02/2024 2.67 0.70 - 3.10 10*3/mm3 Final     Monocytes, Absolute   Date Value Ref Range Status   04/02/2024 0.59 0.10 - 0.90 10*3/mm3 Final     Eosinophils, Absolute   Date Value Ref Range Status   04/02/2024 0.21 0.00 - 0.40 10*3/mm3 Final     Basophils, Absolute   Date Value Ref Range Status   04/02/2024 0.05 0.00 - 0.20 10*3/mm3 Final     Immature Grans, Absolute   Date Value Ref Range Status   04/02/2024 0.01 0.00 - 0.05 10*3/mm3 Final     nRBC   Date Value Ref Range Status   04/02/2024 0.0 0.0 - 0.2 /100 WBC Final     Lab Results   Component Value Date    GLUCOSE 104 (H) 04/02/2024    BUN 15 04/02/2024    CREATININE 0.82 04/02/2024    EGFR 86.2 04/02/2024    BCR 18.3 04/02/2024    K 3.8 04/02/2024    CO2 23.0 04/02/2024    CALCIUM 9.3 04/02/2024    ALBUMIN 4.2 04/02/2024    BILITOT 0.3 04/02/2024    AST 13 04/02/2024    ALT 11 04/02/2024     CK 52   lactate 1.2  Ethanol <10  UDS negative      CT Angiogram Head w AI Analysis of LVO    Result Date: 4/2/2024  1.No hemodynamically significant stenosis, large vessel cut or aneurysms in intracranial circulation 2.No hemodynamically significant stenosis, dissection or aneurysms in  extracranial circulation. Electronically Signed: Shlomo Bal MD  4/2/2024 4:07 PM EDT  Workstation ID: [id number]    CT Angiogram Neck    Result Date: 4/2/2024  1.No hemodynamically significant stenosis, large vessel cut or aneurysms in intracranial circulation 2.No hemodynamically significant stenosis, dissection or aneurysms in extracranial circulation. Electronically Signed: Shlomo Bal MD  4/2/2024 4:07 PM EDT  Workstation ID: [id number]    CT CEREBRAL PERFUSION WITH & WITHOUT CONTRAST    Result Date: 4/2/2024   1. No evidence of core infarct nor ischemic brain at risk. Electronically Signed: Shlomo Bal MD  4/2/2024 3:58 PM EDT  Workstation ID: [id number]    CT Head Without Contrast Stroke Protocol    Result Date: 4/2/2024  Impression: No acute intracranial findings. Electronically Signed: Mario Oropeza MD  4/2/2024 3:33 PM EDT  Workstation ID: [id number]       Results for orders placed during the hospital encounter of 11/21/23    Adult Transthoracic Echo Complete W/ Cont if Necessary Per Protocol    Interpretation Summary    Left ventricular systolic function is normal. Calculated left ventricular EF = 61% Left ventricular ejection fraction appears to be 61 - 65%.    Left ventricular diastolic function was normal.        Assessment/Plan:  53-year-old, , right-handed female with known diagnosis of HTN, HLD, migraines, conversion disorder, history of seizure disorder including nonepileptic seizures, history of brain tumor s/p resection in 1989 (data deficient, no evidence of this on CT head), COPD, stage III CKD, elevated liver enzymes, and current everyday smoker who presented with unresponsiveness.  Last known well around 1400, patient was found unresponsive by family soon after.  Upon arrival to [facility] patient reportedly had a GCS of 6 and was completely unresponsive to painful stimuli, unresponsive to sternal rub.  Initial SBP  > 250.  Per ED provider she periodically opened her eyes and looked around  "the room, she was able to move all extremities but not to command, she did speak saying \"I do not feel well,\" before becoming unresponsive once again.  This happened at least 3 times.  Patient was intubated for airway protection, she was not hypoxic.  NIHS 30; (patient was evaluated soon after being intubated and sedated).  Not a candidate for IV TNK given nonfocal exam and concern for possible seizure.    CT head is negative for acute abnormality  CT perfusion is negative  CTA head and neck is negative for hemodynamically significant stenosis, no LVO or aneurysm    PTA antiplatelet: Aspirin 81 mg daily  PTA anticoagulant: None      Unresponsiveness  -Suspect hypertensive emergency rather than cerebrovascular event or seizure  -Recommend stat MRI to evaluate for any possibility of stroke  -Cardene for SBP > 220; if MRI is negative for stroke would recommend goal systolic 200  -Will hold off on adding stroke order set pending MRI results  -Received Keppra 1500 mg in the ED  -Follows with Dr. Tello; has a history of PNES. Home Keppra was DC'd per Dr. Tello.  Patient is on topamax for migraines  -Family reports noncompliance with medications  -Consider EEG in a.m. if not improved  -If MRI is negative will consult general neurology    Plan of care was discussed with family and Dr. Garcia.    Amara Valle, LEANN  April 2, 2024  16:11 EDT              "

## 2024-04-02 NOTE — H&P
"INTENSIVIST   INITIAL HOSPITAL VISIT NOTE     Chief Complaint: Found unresponsive by family    Subjective   S   Arcelia Quintero is a 53 y.o. female who presents to [facility] ED 04/02/24 with suspected CVA.     Patient has a PMH of tobacco use, emphysema/COPD, chronic migraines on Topamax, seizure disorder including nonepileptic seizures (follows with Dr. 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     Per report, patient has been feeling unwell for the past several days with intermittent episodes of \"blacking out.\" Her LKW was ~1400 today at which time she told her daughter she did not feel well and went to lay down for a nap. Upon going to check in on her later this afternoon found her unresponsive, prompting call to EMS. Upon their arrival she was additionally noted to be significantly hypertensive ('s) and was brought to our ED for further evaluation.     Upon ED arrival initial VS as follows: Temp 97.4 F, HR 84, /120 mmHg, RR 16, and SpO2 95% on RA, however was intubated for airway protection. Post-intubation ABG with hypercapnia. CXR with ETT in good position and was negative for cardiopulmonary process. Labs unremarkable with UA and UDS negative. CTH was negative for an acute intracranial abnormality with subsequent CTA H/N negative for flow limiting stenosis and CTP negative for LVO.     She was evaluated by Neurology who recommended Keppra load and STAT MRI. Ms. Quintero will be admitted to ICU for further management.     Time spent: 10 minutes  Electronically signed by Gerri Gastelum, LOUIS, APRN, 04/02/24, 4:48 PM EDT.     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   PSxH: She  has a past surgical history that includes Brain surgery; Colonoscopy; Upper gastrointestinal endoscopy; Liver biopsy; and Cardiac catheterization (N/A, 12/27/2023).      Medications:  No current facility-administered medications on file prior to encounter.     Current Outpatient Medications on File Prior to Encounter   Medication Sig    amLODIPine (NORVASC) 5 MG tablet Take 1 tablet by mouth Daily.    aspirin 81 MG EC tablet Take 1 tablet by mouth Daily.    atorvastatin (LIPITOR) 40 MG tablet Take 1 tablet by mouth Daily.    azithromycin (Zithromax Z-Jeremy) 250 MG tablet Take 2 tablets by mouth on day 1, then 1 tablet daily on days 2-5    benzonatate (TESSALON) 200 MG capsule Take 1 capsule by mouth 3 (Three) Times a Day As Needed for Cough.    Budeson-Glycopyrrol-Formoterol (BREZTRI) 160-9-4.8 MCG/ACT aerosol inhaler Inhale 2 puffs 2 (Two) Times a Day.    carvedilol (COREG) 12.5 MG tablet Take 1 tablet by mouth 2 (Two) Times a Day With Meals.    FLUoxetine (PROzac) 20 MG capsule Take 1 capsule by mouth Daily.    lactulose (CHRONULAC) 10 GM/15ML solution Take 45 mL by mouth 2 (Two) Times a Day.    levETIRAcetam (Keppra) 100 MG/ML solution Take 5 mL by mouth 2 (Two) Times a Day.    linaclotide (Linzess) 145 MCG capsule capsule Take 1 capsule by mouth Every Morning Before Breakfast.    methylPREDNISolone (MEDROL) 4 MG dose pack Take as directed on package  instructions.    mirtazapine (REMERON) 7.5 MG tablet Take 2 tablets by mouth Every Night.    pantoprazole (PROTONIX) 40 MG EC tablet TAKE 1 TABLET BY MOUTH DAILY    rizatriptan (Maxalt) 10 MG tablet Take 1 tablet at onset of headache.  May repeat once in 2 hours.  Do not take more than 2 tabs a day or 8 tabs a month    topiramate (TOPAMAX) 25 MG tablet TAKE ONE TABLET BY MOUTH ONCE NIGHTLY FOR 7 DAYS THEN TAKE 1 TABLET BY MOUTH TWICE A DAY FOR 7 DAYS THEN TAKE ONE TABLET BY MOUTH EVERY MORNING AND 2 TABLETS AT NIGHT FOR 7 DAYS (Patient taking differently: Take 1 tablet by mouth 2 (Two) Times a Day. TAKE ONE TABLET BY MOUTH ONCE NIGHTLY FOR 7 DAYS THEN TAKE 1 TABLET BY MOUTH TWICE A DAY FOR 7 DAYS THEN TAKE ONE TABLET BY MOUTH EVERY MORNING AND 2 TABLETS AT NIGHT FOR 7 DAYS)     Allergies: She is allergic to codeine, influenza vaccines, and propofol.   FH: Her family history includes Breast cancer in an other family member; Colon cancer in an other family member; Diabetes in an other family member; Heart disease in her mother; Lung disease in her mother; No Known Problems in her brother and sister; Ovarian cancer in her mother; Prostate cancer in her father; Stroke in her mother; Throat cancer in her mother; Uterine cancer in her mother. She was adopted.   SH: She  reports that she has been smoking cigarettes. She started smoking about 43 years ago. She has a 20.9 pack-year smoking history. She has been exposed to tobacco smoke. She has never used smokeless tobacco. She reports that she does not drink alcohol and does not use drugs.     The patient's relevant past medical, surgical and social history were reviewed and updated in Epic as appropriate.     Objective   O     Medications (drips):  midazolam, Last Rate: 1 mg/hr (04/02/24 1513)  niCARdipine    Physical Examination:  Vital Signs: Blood pressure (!) 171/128, pulse 96, temperature 97.4 °F (36.3 °C), temperature source Axillary, resp. rate 16, height 170.2 cm  "(67\"), weight 65.8 kg (145 lb), SpO2 99%, not currently breastfeeding.    General: Appears comfortable on mechanical ventilation.  Hypertensive, tachycardic.  Afebrile.  Chest: Clear to auscultation bilaterally, No wheezing, rhonchi, or rales.  Appropriate oxygen saturations on mechanical ventilation.  Cardiac: Tachycardic.  S1S2 auscultated. No murmurs, rubs or gallops.   Abdomen: Soft, non-tender, non-distended, positive bowel sounds in all four quadrants.   Extremities: No lower extremity edema. No clubbing or cyanosis.  Neuro: Intubated, sedated.  Does not wince to noxious stimuli.    Lines, Drains & Airways       Active LDAs       Name Placement date Placement time Site Days    Peripheral IV 04/02/24 Anterior;Distal;Left;Upper Arm 04/02/24  --  Arm  less than 1    Peripheral IV Right Antecubital --  --  Antecubital  --    Peripheral IV 04/02/24 Left Antecubital 04/02/24  --  Antecubital  less than 1    NG/OG Tube Nasogastric 16 Fr Right nostril 04/02/24  1510  Right nostril  less than 1    Urethral Catheter 16 Fr. 04/02/24  1513  -- less than 1    ETT  04/02/24  1507  -- less than 1        Results from last 7 days   Lab Units 04/02/24  1510 04/02/24  1501   WBC 10*3/mm3 6.33  --    HEMOGLOBIN g/dL 15.3  --    HEMOGLOBIN, POC g/dL  --  14.6   MCV fL 90.1  --    PLATELETS 10*3/mm3 186  --      Results from last 7 days   Lab Units 04/02/24  1507 04/02/24  1501   SODIUM mmol/L 136  --    POTASSIUM mmol/L 3.8  --    CO2 mmol/L 23.0  --    CREATININE mg/dL 0.82 1.00   MAGNESIUM mg/dL 2.1  --      Estimated Creatinine Clearance: 83.4 mL/min (by C-G formula based on SCr of 0.82 mg/dL).  Results from last 7 days   Lab Units 04/02/24  1507   ALK PHOS U/L 106   BILIRUBIN mg/dL 0.3   ALT (SGPT) U/L 11   AST (SGOT) U/L 13     Respiratory (ie nasal cannula, HFNC, BiPAP, mechanical ventilation settings) support: MV    Images:  XR Chest 1 View    Result Date: 4/2/2024  Impression: 1. ET tube and NG tube appear to be in " satisfactory position. 2. No evidence of active chest disease is seen. Electronically Signed: Jonathan Gonzalez MD  4/2/2024 4:16 PM EDT  Workstation ID: [id number]    XR Abdomen KUB    Result Date: 4/2/2024  Impression: Nasogastric tube terminates proximally in the stomach with the proximal side port near the GE junction. Electronically Signed: Shaquille Avalos MD  4/2/2024 4:11 PM EDT  Workstation ID: [id number]    CT Angiogram Head w AI Analysis of LVO    Result Date: 4/2/2024  1.No hemodynamically significant stenosis, large vessel cut or aneurysms in intracranial circulation 2.No hemodynamically significant stenosis, dissection or aneurysms in extracranial circulation. Electronically Signed: Shlomo Bal MD  4/2/2024 4:07 PM EDT  Workstation ID: [id number]    CT Angiogram Neck    Result Date: 4/2/2024  1.No hemodynamically significant stenosis, large vessel cut or aneurysms in intracranial circulation 2.No hemodynamically significant stenosis, dissection or aneurysms in extracranial circulation. Electronically Signed: Shlomo Bal MD  4/2/2024 4:07 PM EDT  Workstation ID: [id number]    CT CEREBRAL PERFUSION WITH & WITHOUT CONTRAST    Result Date: 4/2/2024   1. No evidence of core infarct nor ischemic brain at risk. Electronically Signed: Shlomo Bal MD  4/2/2024 3:58 PM EDT  Workstation ID: [id number]    CT Head Without Contrast Stroke Protocol    Result Date: 4/2/2024  Impression: No acute intracranial findings. Electronically Signed: Mario Oropeza MD  4/2/2024 3:33 PM EDT  Workstation ID: [id number]     I reviewed the patient's new laboratory and imaging results.  I independently reviewed the patient's new images.    Assessment & Plan    A / P     Active Hospital Problems    Diagnosis     **Encephalopathy acute     Acute respiratory failure with hypercapnia     Hypertensive emergency     H/O PNES     CKD (chronic kidney disease), stage III     Emphysema/COPD     Anxiety and depression     Dyslipidemia     GERD  (gastroesophageal reflux disease)      #Encephalopathy  -Etiology of obtundation unclear at this time.  Differential diagnosis includes CVA/TIA, seizure, toxic, press, CNS infection.  Stroke/neurology team following given acute onset of symptoms. Preliminary imaging inconsistent with acute CVA but will be admitted to the neuro ICU for close hemodynamic monitoring, neurochecks, etc  -Lactate, TSH WNL. UDS + urine fentanyl as well as ethanol, salicylate and acetaminophen negative. Patient afebrile with normal WBC-- making infection less likely. Procalcitonin pending. UA normal   -Intubated for airway protection in the ER (4/02)  -MRI pending     #Acute hypoxic respiratory failure requiring intubation/mechanical ventilation  -Chiefly intubated for airway protection. Adjusting mechanical ventilator based on clinical exam and serial ABG     #HTN Emergency  - Cardene gtt if needed for SBP >220    #PNES  -Loaded with Keppra in the ED. Questionably on Keppra at home. Will need to confirm with patient when extubated   -Repeat EEG pending    Pre-existing conditions:   #Anxiety/depression/PTSD  #Constipation  #GERD  #HLD  #Emphysema/COPD  -Continue home medications when clinically appropriate     F-NPO  A-Fentanyl  S-Versed  T-SCDs   H-Head of bed greater than 30 degrees  U-NA  G-FSBS per unit protocol, correction dose insulin  S-NA  B-Will monitor and provide regimen if indicated  I-PIV  D-NA    Plan of care and goals reviewed during interdisciplinary rounds.  I discussed the patient's findings and my recommendations with patient, nursing staff, and consulting provider    Time: Critical Care time spent in direct patient care: 50 minutes (excluding procedure time, if applicable) including high complexity decision making to assess, manipulate, and support vital organ system failure in this individual who has impairment of one or more vital organ systems such that there is a high probability of imminent or life threatening  deterioration in the patient’s condition.     -- Terrance Dickerson MD  Pulmonary/Critical Care

## 2024-04-02 NOTE — ED PROVIDER NOTES
[place]    EMERGENCY DEPARTMENT ENCOUNTER      Pt Name: Arcelia Quintero  MRN: [id number]  YOB: 1970  Date of evaluation: 4/2/2024  Provider: Juan Daniel Garcia MD    CHIEF COMPLAINT       Chief Complaint   Patient presents with    Altered Mental Status         HISTORY OF PRESENT ILLNESS   Arcelia Quintero is a 53 y.o. female who presents to the emergency department with altered mental status. Report was taken from EMS. Per report, LKW about 45 min PTA. Pt told her daughter that she was not feeling well and need to lie down. Daughter went in to check on her shortly after and found her to be unresponsive. Pt breathing normally and w normal VS during EMS transport apart from significantly elevated BP. They report pt having hx of seizure, stroke.       Nursing notes were reviewed.    REVIEW OF SYSTEMS     ROS:  A chief complaint appropriate review of systems was completed and is negative except as noted in the HPI.      PAST MEDICAL HISTORY     Past Medical History:   Diagnosis Date    Abnormal Pap smear of cervix     Allergic rhinitis     Asthma     Atypical chest pain     Brain tumor     Status post brain tumor resection in 1989.    Candidiasis of mouth     Cervical high risk HPV (human papillomavirus) test positive 09/01/2020    CKD (chronic kidney disease), stage III     Colon polyps 09/09/2020    sessile and tubular adenoma    Conversion disorder     COPD (chronic obstructive pulmonary disease)     COVID-19     Elevated liver enzymes     Former smoker 07/05/2023    H/O Helicobacter infection      Status post EGD 9/4/2012, pathology revealed H. pylori infection.    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     Neurology evaluation thought it was numbness from carpal tunnel and not Guillain-[place]    Hyperlipidemia     Hypertension     Influenza     Internal hemorrhoids     Low grade squamous intraepith lesion on cytologic smear cervix (lgsil) 09/01/2020     Median neuropathy     Migraine     PTSD (post-traumatic stress disorder)     Seizures     2 seizures after surgery    Small fiber neuropathy     Stroke     Tinea corporis          SURGICAL HISTORY       Past Surgical History:   Procedure Laterality Date    BRAIN SURGERY      status post brain tumor resection    CARDIAC CATHETERIZATION N/A 12/27/2023    Procedure: Left Heart Cath;  Surgeon: Ryder Delarosa III, MD;  [facility];  Service: Cardiovascular;  Laterality: N/A;    COLONOSCOPY      polyp removal    LIVER BIOPSY      UPPER GASTROINTESTINAL ENDOSCOPY      Status post EGD 9/4/2012, pathology revealed H. pylori infection.         CURRENT MEDICATIONS       Current Facility-Administered Medications:     acetaminophen (TYLENOL) 160 MG/5ML oral solution 650 mg, 650 mg, Nasogastric, Q6H PRN, Juan Daniel Ballard, APRN, 650 mg at 04/04/24 2201    amoxicillin-clavulanate (AUGMENTIN) 875-125 MG per tablet 1 tablet, 1 tablet, Oral, Q12H, Case, Jenae V., DO, 1 tablet at 04/05/24 1153    sennosides-docusate (PERICOLACE) 8.6-50 MG per tablet 2 tablet, 2 tablet, Nasogastric, BID, 2 tablet at 04/05/24 0950 **AND** polyethylene glycol (MIRALAX) packet 17 g, 17 g, Nasogastric, Daily PRN **AND** bisacodyl (DULCOLAX) EC tablet 5 mg, 5 mg, Oral, Daily PRN **AND** bisacodyl (DULCOLAX) suppository 10 mg, 10 mg, Rectal, Daily PRN, Pancho Smyth, [facility]    Calcium Replacement - Follow Nurse / BPA Driven Protocol, , Does not apply, PRN, Ted Dickerson MD    carvedilol (COREG) tablet 12.5 mg, 12.5 mg, Oral, Q12H, Case, Jenae V., DO, 12.5 mg at 04/05/24 1441    ipratropium-albuterol (DUO-NEB) nebulizer solution 3 mL, 3 mL, Nebulization, 4x Daily - RT, Case, Jenae V., DO, 3 mL at 04/05/24 1305    Magnesium Standard Dose Replacement - Follow Nurse / BPA Driven Protocol, , Does not apply, PRN, Ted Dickerson MD    midazolam (VERSED) injection 2 mg, 2 mg, Intravenous, Q4H PRN, Mary Ann Gonzalez, APRN, 2 mg at  24 0127    nitroglycerin (NITROSTAT) SL tablet 0.4 mg, 0.4 mg, Sublingual, Q5 Min PRN, Ted Dickerson MD    norepinephrine (LEVOPHED) 8 mg in 250 mL NS infusion (premix), 0.02-0.3 mcg/kg/min, Intravenous, Titrated, Juan Daniel Ballard APRN, Stopped at 24 1100    ondansetron (ZOFRAN) injection 4 mg, 4 mg, Intravenous, Q6H PRN, Juan Daniel Ballard APRN, 4 mg at 24    pantoprazole (PROTONIX) injection 40 mg, 40 mg, Intravenous, Q AM, Juan Daniel Ballard APRN, 40 mg at 24 0616    Phosphorus Replacement - Follow Nurse / BPA Driven Protocol, , Does not apply, Tuan WALLACE Joshua, MD    Potassium Replacement - Follow Nurse / BPA Driven Protocol, , Does not apply, Tuan WALLACE Joshua, MD    sodium chloride 0.9 % flush 10 mL, 10 mL, Intravenous, PRN, Juan Daniel Garcia MD    sodium chloride 0.9 % flush 10 mL, 10 mL, Intravenous, Q12H, Ted Dickerson MD, 10 mL at 24 0951    sodium chloride 0.9 % flush 10 mL, 10 mL, Intravenous, PRN, Ted Dickerson MD, 10 mL at 24    sodium chloride 0.9 % infusion 40 mL, 40 mL, Intravenous, PRTuan WILLIS Joshua, MD    ALLERGIES     Codeine, Influenza vaccines, and Propofol    FAMILY HISTORY       Family History   Adopted: Yes   Problem Relation Age of Onset    Lung disease Mother     Heart disease Mother     Stroke Mother     Throat cancer Mother     Uterine cancer Mother     Ovarian cancer Mother     Prostate cancer Father     No Known Problems Sister     No Known Problems Brother     Breast cancer Other     Diabetes Other     Colon cancer Other           SOCIAL HISTORY       Social History     Socioeconomic History    Marital status: Legally    Tobacco Use    Smoking status: Every Day     Current packs/day: 0.00     Average packs/day: 0.5 packs/day for 41.7 years (20.9 ttl pk-yrs)     Types: Cigarettes     Start date: 1981     Last attempt to quit: 2022     Years since quittin.5     Passive exposure: Current    Smokeless  tobacco: Never    Tobacco comments:     quit with chantix in past   Vaping Use    Vaping status: Never Used   Substance and Sexual Activity    Alcohol use: No    Drug use: No    Sexual activity: Yes     Partners: Male     Birth control/protection: Post-menopausal         PHYSICAL EXAM    (up to 7 for level 4, 8 or more for level 5)     Vitals:    04/05/24 1100 04/05/24 1200 04/05/24 1305 04/05/24 1400   BP: 155/89 153/89  157/86   BP Location:       Patient Position:       Pulse: 75 69 71 81   Resp:       Temp:       TempSrc:       SpO2: 96% 94% 94% 90%   Weight:       Height:           General: Unresponsive.  HEENT: Conjunctivae normal.  Neck: Trachea midline.  Cardiac: Heart regular rate  Lungs: Normal effort  Chest wall: No deformity  Abdomen: Non-distended  Musculoskeletal: No deformity.  Neuro: GCS 3  Dermatology: Skin is warm and dry  Psych: Cannot assess        DIAGNOSTIC RESULTS     EKG: All EKGs are interpreted by the Emergency Department Physician who either signs or Co-signs this chart in the absence of a cardiologist.    ECG 12 Lead Rhythm Change   Final Result   Test Reason : Other~   Blood Pressure :   */*   mmHG   Vent. Rate :  54 BPM     Atrial Rate :  54 BPM      P-R Int : 146 ms          QRS Dur :  98 ms       QT Int : 606 ms       P-R-T Axes :  87  16 240 degrees      QTc Int : 574 ms      Sinus bradycardia   Marked T wave abnormality, consider inferior ischemia   Marked T wave abnormality, consider anterolateral ischemia   T wave changes suggest possible intracranial process   Prolonged QT   Abnormal ECG   When compared with ECG of 03-APR-2024 16:24, (Unconfirmed)   Vent. rate has decreased BY  40 BPM   Confirmed by NURA GALVAN MD [id number]) on 4/4/2024 11:04:35 AM   Also confirmed by NURA GALVAN MD ([id number]),  Joce Macias ([id number])  on    4/5/2024 8:41:40 AM      Referred By:            Confirmed By: NURA GALVAN MD      ECG 12 Lead Rhythm Change   Final Result   Test Reason : Rhythm Change    Blood Pressure :   */*   mmHG   Vent. Rate :  94 BPM     Atrial Rate :  94 BPM      P-R Int : 122 ms          QRS Dur :  92 ms       QT Int : 474 ms       P-R-T Axes :  60   0 257 degrees      QTc Int : 592 ms      Normal sinus rhythm   Anterior infarct (cited on or before 02-APR-2024)   Marked T wave abnormality, consider inferolateral ischemia   Prolonged QT   Abnormal ECG   When compared with ECG of 02-APR-2024 15:18, (Unconfirmed)   QRS axis shifted right   Serial changes of evolving Anterior infarct present   Confirmed by NURA GALVAN MD ([id number]) on 4/4/2024 11:02:00 AM      Referred By:            Confirmed By: NURA GALVAN MD      ECG 12 Lead ED Triage Standing Order; Weak / Dizzy / AMS   Final Result   Test Reason : ED Triage Standing Order~   Blood Pressure :   */*   mmHG   Vent. Rate : 116 BPM     Atrial Rate : 116 BPM      P-R Int : 140 ms          QRS Dur :  86 ms       QT Int : 338 ms       P-R-T Axes :  87 -72  79 degrees      QTc Int : 469 ms      Sinus tachycardia   Possible Left atrial enlargement   Left axis deviation   Anteroseptal infarct , age undetermined   Abnormal ECG   When compared with ECG of 09-JAN-2024 13:03,   Vent. rate has increased BY  46 BPM   Incomplete right bundle branch block is no longer present   Anteroseptal infarct is now present   Confirmed by NANY LOZANO ([id number]) on 4/4/2024 1:05:24 AM      Referred By: JETHRO           Confirmed By: NANY LOZANO            RADIOLOGY:   [x] Radiologist's Report Reviewed:  SLP FEES - Fiberoptic Endo Eval Swallow   Final Result      XR Chest 1 View   Final Result   Impression:   Unchanged support lines. No acute process identified in the lung fields.         Electronically Signed: Linda Gomez MD     4/4/2024 7:28 AM EDT     Workstation ID: [id number]      MRI Brain Without Contrast   Final Result   Impression:      1. No evidence of hemorrhage, mass effect or midline shift. No evidence of recent or acute ischemia.   2. Mild periventricular  and subcortical FLAIR signal changes likely related to chronic microvascular ischemic change.            Electronically Signed: Shanthi Dai MD     4/3/2024 3:04 AM EDT     Workstation ID: [id number]      XR Chest 1 View   Final Result   Impression:      1. ET tube and NG tube appear to be in satisfactory position.      2. No evidence of active chest disease is seen.         Electronically Signed: Jonathan Gonzalez MD     4/2/2024 4:16 PM EDT     Workstation ID: [id number]      XR Abdomen KUB   Final Result   Impression:   Nasogastric tube terminates proximally in the stomach with the proximal side port near the GE junction.            Electronically Signed: Shaquille Avalos MD     4/2/2024 4:11 PM EDT     Workstation ID: [id number]      CT Angiogram Head w AI Analysis of LVO   Final Result   1.No hemodynamically significant stenosis, large vessel cut or aneurysms in intracranial circulation   2.No hemodynamically significant stenosis, dissection or aneurysms in extracranial circulation.                Electronically Signed: Shlomo Bal MD     4/2/2024 4:07 PM EDT     Workstation ID: [id number]      CT Angiogram Neck   Final Result   1.No hemodynamically significant stenosis, large vessel cut or aneurysms in intracranial circulation   2.No hemodynamically significant stenosis, dissection or aneurysms in extracranial circulation.                Electronically Signed: Shlomo Bal MD     4/2/2024 4:07 PM EDT     Workstation ID: [id number]      CT CEREBRAL PERFUSION WITH & WITHOUT CONTRAST   Final Result       1. No evidence of core infarct nor ischemic brain at risk.            Electronically Signed: Shlomo Bal MD     4/2/2024 3:58 PM EDT     Workstation ID: [id number]      CT Head Without Contrast Stroke Protocol   Final Result   Impression:   No acute intracranial findings.            Electronically Signed: Mario Oropeza MD     4/2/2024 3:33 PM EDT     Workstation ID: [id number]          I ordered and independently reviewed the  above noted radiographic studies.        LABS:    I have reviewed and interpreted all of the currently available lab results from this visit (if applicable):  Results for orders placed or performed during the hospital encounter of 04/02/24   Respiratory Panel PCR w/COVID-19(SARS-CoV-2) YENY/SHALOM/KIMMIE/PAD/COR/LUKAS In-House, NP Swab in UTM/VTM, 2 HR TAT - Swab, Nasopharynx    Specimen: Nasopharynx; Swab   Result Value Ref Range    ADENOVIRUS, PCR Not Detected Not Detected    Coronavirus 229E Not Detected Not Detected    Coronavirus HKU1 Not Detected Not Detected    Coronavirus NL63 Not Detected Not Detected    Coronavirus OC43 Not Detected Not Detected    COVID19 Not Detected Not Detected - Ref. Range    Human Metapneumovirus Not Detected Not Detected    Human Rhinovirus/Enterovirus Not Detected Not Detected    Influenza A PCR Not Detected Not Detected    Influenza B PCR Not Detected Not Detected    Parainfluenza Virus 1 Not Detected Not Detected    Parainfluenza Virus 2 Not Detected Not Detected    Parainfluenza Virus 3 Not Detected Not Detected    Parainfluenza Virus 4 Not Detected Not Detected    RSV, PCR Not Detected Not Detected    Bordetella pertussis pcr Not Detected Not Detected    Bordetella parapertussis PCR Not Detected Not Detected    Chlamydophila pneumoniae PCR Not Detected Not Detected    Mycoplasma pneumo by PCR Not Detected Not Detected   Respiratory Culture - Aspirate, ET Suction    Specimen: ET Suction; Aspirate   Result Value Ref Range    Respiratory Culture Heavy growth (4+) Streptococcus pneumoniae (A)     Respiratory Culture Scant growth (1+) Normal Respiratory Amaya     Gram Stain Many (4+) WBCs per low power field     Gram Stain Many (4+) Gram positive cocci in pairs and chains     Gram Stain Rare (1+) Epithelial cells per low power field     Gram Stain Few (2+) Gram negative bacilli    Blood Culture - Blood, Arm, Left    Specimen: Arm, Left; Blood   Result Value Ref Range    Blood Culture No  growth at 2 days    Blood Culture - Blood, Hand, Left    Specimen: Hand, Left; Blood   Result Value Ref Range    Blood Culture No growth at 2 days    MRSA Screen, PCR (Inpatient) - Swab, Nares    Specimen: Nares; Swab   Result Value Ref Range    MRSA PCR Negative Negative   Comprehensive Metabolic Panel    Specimen: Blood   Result Value Ref Range    Glucose 104 (H) 65 - 99 mg/dL    BUN 15 6 - 20 mg/dL    Creatinine 0.82 0.57 - 1.00 mg/dL    Sodium 136 136 - 145 mmol/L    Potassium 3.8 3.5 - 5.2 mmol/L    Chloride 104 98 - 107 mmol/L    CO2 23.0 22.0 - 29.0 mmol/L    Calcium 9.3 8.6 - 10.5 mg/dL    Total Protein 6.9 6.0 - 8.5 g/dL    Albumin 4.2 3.5 - 5.2 g/dL    ALT (SGPT) 11 1 - 33 U/L    AST (SGOT) 13 1 - 32 U/L    Alkaline Phosphatase 106 39 - 117 U/L    Total Bilirubin 0.3 0.0 - 1.2 mg/dL    Globulin 2.7 gm/dL    A/G Ratio 1.6 g/dL    BUN/Creatinine Ratio 18.3 7.0 - 25.0    Anion Gap 9.0 5.0 - 15.0 mmol/L    eGFR 86.2 >60.0 mL/min/1.73   Single High Sensitivity Troponin T    Specimen: Blood   Result Value Ref Range    HS Troponin T <6 <14 ng/L   Magnesium    Specimen: Blood   Result Value Ref Range    Magnesium 2.1 1.6 - 2.6 mg/dL   Urinalysis With Microscopic If Indicated (No Culture) - Urine, Clean Catch    Specimen: Urine, Clean Catch   Result Value Ref Range    Color, UA Yellow Yellow, Straw    Appearance, UA Clear Clear    pH, UA 6.0 5.0 - 8.0    Specific Gravity, UA 1.018 1.001 - 1.030    Glucose, UA Negative Negative    Ketones, UA Negative Negative    Bilirubin, UA Negative Negative    Blood, UA Negative Negative    Protein, UA Negative Negative    Leuk Esterase, UA Negative Negative    Nitrite, UA Negative Negative    Urobilinogen, UA 0.2 E.U./dL 0.2 - 1.0 E.U./dL   CBC Auto Differential    Specimen: Blood   Result Value Ref Range    WBC 6.33 3.40 - 10.80 10*3/mm3    RBC 4.94 3.77 - 5.28 10*6/mm3    Hemoglobin 15.3 12.0 - 15.9 g/dL    Hematocrit 44.5 34.0 - 46.6 %    MCV 90.1 79.0 - 97.0 fL    MCH 31.0  26.6 - 33.0 pg    MCHC 34.4 31.5 - 35.7 g/dL    RDW 13.4 12.3 - 15.4 %    RDW-SD 44.5 37.0 - 54.0 fl    MPV 11.4 6.0 - 12.0 fL    Platelets 186 140 - 450 10*3/mm3    Neutrophil % 44.2 42.7 - 76.0 %    Lymphocyte % 42.2 19.6 - 45.3 %    Monocyte % 9.3 5.0 - 12.0 %    Eosinophil % 3.3 0.3 - 6.2 %    Basophil % 0.8 0.0 - 1.5 %    Immature Grans % 0.2 0.0 - 0.5 %    Neutrophils, Absolute 2.80 1.70 - 7.00 10*3/mm3    Lymphocytes, Absolute 2.67 0.70 - 3.10 10*3/mm3    Monocytes, Absolute 0.59 0.10 - 0.90 10*3/mm3    Eosinophils, Absolute 0.21 0.00 - 0.40 10*3/mm3    Basophils, Absolute 0.05 0.00 - 0.20 10*3/mm3    Immature Grans, Absolute 0.01 0.00 - 0.05 10*3/mm3    nRBC 0.0 0.0 - 0.2 /100 WBC   TSH    Specimen: Blood   Result Value Ref Range    TSH 1.850 0.270 - 4.200 uIU/mL   T4, Free    Specimen: Blood   Result Value Ref Range    Free T4 0.83 (L) 0.92 - 1.68 ng/dL   Acetaminophen Level    Specimen: Blood   Result Value Ref Range    Acetaminophen <5.0 0.0 - 30.0 mcg/mL   Ethanol    Specimen: Blood   Result Value Ref Range    Ethanol <10 0 - 10 mg/dL   Urine Drug Screen - Urine, Clean Catch    Specimen: Urine, Clean Catch   Result Value Ref Range    THC, Screen, Urine Negative Negative    Phencyclidine (PCP), Urine Negative Negative    Cocaine Screen, Urine Negative Negative    Methamphetamine, Ur Negative Negative    Opiate Screen Negative Negative    Amphetamine Screen, Urine Negative Negative    Benzodiazepine Screen, Urine Negative Negative    Tricyclic Antidepressants Screen Negative Negative    Methadone Screen, Urine Negative Negative    Barbiturates Screen, Urine Negative Negative    Oxycodone Screen, Urine Negative Negative    Buprenorphine, Screen, Urine Negative Negative   Salicylate Level    Specimen: Blood   Result Value Ref Range    Salicylate <0.3 <=30.0 mg/dL   Fentanyl, Urine - Urine, Clean Catch    Specimen: Urine, Clean Catch   Result Value Ref Range    Fentanyl, Urine Negative Negative   CK     Specimen: Blood   Result Value Ref Range    Creatine Kinase 52 20 - 180 U/L   Lactic Acid, Plasma    Specimen: Blood   Result Value Ref Range    Lactate 1.2 0.5 - 2.0 mmol/L   Blood Gas, Arterial With Co-Ox    Specimen: Arterial Blood   Result Value Ref Range    Site Right Radial     Gian's Test N/A     pH, Arterial 7.258 (L) 7.350 - 7.450 pH units    pCO2, Arterial 49.3 (H) 35.0 - 45.0 mm Hg    pO2, Arterial 228.0 (H) 83.0 - 108.0 mm Hg    HCO3, Arterial 22.0 20.0 - 26.0 mmol/L    Base Excess, Arterial -5.6 (L) 0.0 - 2.0 mmol/L    Hemoglobin, Blood Gas 16.2 14 - 18 g/dL    Hematocrit, Blood Gas 49.6 38.0 - 51.0 %    Oxyhemoglobin 96.4 94 - 99 %    Methemoglobin 0.40 0.00 - 1.50 %    Carboxyhemoglobin 3.2 (H) 0 - 2 %    CO2 Content 23.5 22 - 33 mmol/L    Temperature 37.0     Barometric Pressure for Blood Gas      Modality Ventilator     FIO2 100 %    Ventilator Mode VC+/AC     Set Tidal Volume 0.45     Rate 14 Breaths/minute    PEEP 5.0     PIP 0 cmH2O    IPAP 0     EPAP 0     pH, Temp Corrected 7.258 pH Units    pCO2, Temperature Corrected 49.3 (H) 35 - 45 mm Hg    pO2, Temperature Corrected 228 (H) 83 - 108 mm Hg   Procalcitonin    Specimen: Blood   Result Value Ref Range    Procalcitonin 0.04 0.00 - 0.25 ng/mL   Lipase    Specimen: Blood   Result Value Ref Range    Lipase 43 13 - 60 U/L   Valproic Acid Level, Total    Specimen: Blood   Result Value Ref Range    Valproic Acid <2.8 (L) 50.0 - 125.0 mcg/mL   CBC (No Diff)    Specimen: Blood   Result Value Ref Range    WBC 12.15 (H) 3.40 - 10.80 10*3/mm3    RBC 4.97 3.77 - 5.28 10*6/mm3    Hemoglobin 15.0 12.0 - 15.9 g/dL    Hematocrit 45.3 34.0 - 46.6 %    MCV 91.1 79.0 - 97.0 fL    MCH 30.2 26.6 - 33.0 pg    MCHC 33.1 31.5 - 35.7 g/dL    RDW 13.4 12.3 - 15.4 %    RDW-SD 45.3 37.0 - 54.0 fl    MPV 11.4 6.0 - 12.0 fL    Platelets 178 140 - 450 10*3/mm3   Basic Metabolic Panel    Specimen: Blood   Result Value Ref Range    Glucose 120 (H) 65 - 99 mg/dL    BUN 20 6 -  20 mg/dL    Creatinine 1.25 (H) 0.57 - 1.00 mg/dL    Sodium 139 136 - 145 mmol/L    Potassium 4.4 3.5 - 5.2 mmol/L    Chloride 105 98 - 107 mmol/L    CO2 21.0 (L) 22.0 - 29.0 mmol/L    Calcium 8.8 8.6 - 10.5 mg/dL    BUN/Creatinine Ratio 16.0 7.0 - 25.0    Anion Gap 13.0 5.0 - 15.0 mmol/L    eGFR 52.0 (L) >60.0 mL/min/1.73   Magnesium    Specimen: Blood   Result Value Ref Range    Magnesium 2.0 1.6 - 2.6 mg/dL   Phosphorus    Specimen: Blood   Result Value Ref Range    Phosphorus 5.5 (H) 2.5 - 4.5 mg/dL   Urinalysis With Culture If Indicated - Urine, Catheter    Specimen: Urine, Catheter   Result Value Ref Range    Color, UA Dark Yellow (A) Yellow, Straw    Appearance, UA Clear Clear    pH, UA 5.5 5.0 - 8.0    Specific Gravity, UA 1.059 (H) 1.001 - 1.030    Glucose, UA Negative Negative    Ketones, UA Negative Negative    Bilirubin, UA Negative Negative    Blood, UA Large (3+) (A) Negative    Protein, UA 30 mg/dL (1+) (A) Negative    Leuk Esterase, UA Negative Negative    Nitrite, UA Negative Negative    Urobilinogen, UA 0.2 E.U./dL 0.2 - 1.0 E.U./dL   Urinalysis, Microscopic Only - Urine, Catheter    Specimen: Urine, Catheter   Result Value Ref Range    RBC, UA 11-20 (A) None Seen, 0-2 /HPF    WBC, UA 3-5 (A) None Seen, 0-2 /HPF    Bacteria, UA 1+ (A) None Seen, Trace /HPF    Squamous Epithelial Cells, UA 0-2 None Seen, 0-2 /HPF    Yeast, UA Small/1+ Yeast None Seen /HPF    Hyaline Casts, UA 7-12 0 - 6 /LPF    Calcium Oxalate Crystals, UA Small/1+ None Seen /HPF    Mucus, UA Small/1+ (A) None Seen, Trace /HPF    Methodology Manual Light Microscopy    Blood Gas, Arterial With Co-Ox    Specimen: Arterial Blood   Result Value Ref Range    Site Right Radial     Gian's Test N/A     pH, Arterial 7.303 (L) 7.350 - 7.450 pH units    pCO2, Arterial 47.3 (H) 35.0 - 45.0 mm Hg    pO2, Arterial 75.7 (L) 83.0 - 108.0 mm Hg    HCO3, Arterial 23.4 20.0 - 26.0 mmol/L    Base Excess, Arterial -3.3 (L) 0.0 - 2.0 mmol/L     Hemoglobin, Blood Gas 14.5 14 - 18 g/dL    Hematocrit, Blood Gas 44.4 38.0 - 51.0 %    Oxyhemoglobin 93.7 (L) 94 - 99 %    Methemoglobin 0.30 0.00 - 1.50 %    Carboxyhemoglobin 1.3 0 - 2 %    CO2 Content 24.9 22 - 33 mmol/L    Temperature 37.0     Barometric Pressure for Blood Gas      Modality Ventilator     FIO2 45 %    Rate 0 Breaths/minute    PIP 0 cmH2O    IPAP 0     EPAP 0     pH, Temp Corrected 7.303 pH Units    pCO2, Temperature Corrected 47.3 (H) 35 - 45 mm Hg    pO2, Temperature Corrected 75.7 (L) 83 - 108 mm Hg   Basic Metabolic Panel    Specimen: Blood   Result Value Ref Range    Glucose 122 (H) 65 - 99 mg/dL    BUN 24 (H) 6 - 20 mg/dL    Creatinine 1.05 (H) 0.57 - 1.00 mg/dL    Sodium 143 136 - 145 mmol/L    Potassium 4.1 3.5 - 5.2 mmol/L    Chloride 110 (H) 98 - 107 mmol/L    CO2 24.0 22.0 - 29.0 mmol/L    Calcium 8.6 8.6 - 10.5 mg/dL    BUN/Creatinine Ratio 22.9 7.0 - 25.0    Anion Gap 9.0 5.0 - 15.0 mmol/L    eGFR 64.1 >60.0 mL/min/1.73   Magnesium    Specimen: Blood   Result Value Ref Range    Magnesium 1.9 1.6 - 2.6 mg/dL   Phosphorus    Specimen: Blood   Result Value Ref Range    Phosphorus 3.6 2.5 - 4.5 mg/dL   High Sensitivity Troponin T    Specimen: Blood   Result Value Ref Range    HS Troponin T 100 (C) <14 ng/L   proBNP    Specimen: Blood   Result Value Ref Range    proBNP 2,303.0 (H) 0.0 - 900.0 pg/mL   High Sensitivity Troponin T 2Hr    Specimen: Blood   Result Value Ref Range    HS Troponin T 89 (C) <14 ng/L    Troponin T Delta -11 (L) >=-4 - <+4 ng/L   Basic Metabolic Panel    Specimen: Blood   Result Value Ref Range    Glucose 132 (H) 65 - 99 mg/dL    BUN 24 (H) 6 - 20 mg/dL    Creatinine 0.74 0.57 - 1.00 mg/dL    Sodium 132 (L) 136 - 145 mmol/L    Potassium 4.0 3.5 - 5.2 mmol/L    Chloride 103 98 - 107 mmol/L    CO2 22.0 22.0 - 29.0 mmol/L    Calcium 8.6 8.6 - 10.5 mg/dL    BUN/Creatinine Ratio 32.4 (H) 7.0 - 25.0    Anion Gap 7.0 5.0 - 15.0 mmol/L    eGFR 97.5 >60.0 mL/min/1.73   CBC  (No Diff)    Specimen: Blood   Result Value Ref Range    WBC 12.79 (H) 3.40 - 10.80 10*3/mm3    RBC 4.33 3.77 - 5.28 10*6/mm3    Hemoglobin 13.3 12.0 - 15.9 g/dL    Hematocrit 41.1 34.0 - 46.6 %    MCV 94.9 79.0 - 97.0 fL    MCH 30.7 26.6 - 33.0 pg    MCHC 32.4 31.5 - 35.7 g/dL    RDW 13.5 12.3 - 15.4 %    RDW-SD 47.6 37.0 - 54.0 fl    MPV 11.4 6.0 - 12.0 fL    Platelets 166 140 - 450 10*3/mm3   Magnesium    Specimen: Blood   Result Value Ref Range    Magnesium 2.4 1.6 - 2.6 mg/dL   Phosphorus    Specimen: Blood   Result Value Ref Range    Phosphorus 2.6 2.5 - 4.5 mg/dL   Blood Gas, Arterial With Co-Ox    Specimen: Arterial Blood   Result Value Ref Range    Site Right Radial     Gian's Test N/A     pH, Arterial 7.350 7.350 - 7.450 pH units    pCO2, Arterial 41.9 35.0 - 45.0 mm Hg    pO2, Arterial 83.0 83.0 - 108.0 mm Hg    HCO3, Arterial 23.1 20.0 - 26.0 mmol/L    Base Excess, Arterial -2.4 (L) 0.0 - 2.0 mmol/L    Hemoglobin, Blood Gas 13.4 (L) 14 - 18 g/dL    Hematocrit, Blood Gas 41.2 38.0 - 51.0 %    Oxyhemoglobin 95.3 94 - 99 %    Methemoglobin 0.30 0.00 - 1.50 %    Carboxyhemoglobin 1.1 0 - 2 %    CO2 Content 24.4 22 - 33 mmol/L    Temperature 37.0     Barometric Pressure for Blood Gas      Modality Ventilator     FIO2 40 %    Ventilator Mode VC+/AC     Set Tidal Volume 0.45     Rate 22 Breaths/minute    PEEP 5.0     PIP 0 cmH2O    IPAP 0     EPAP 0     pH, Temp Corrected 7.350 pH Units    pCO2, Temperature Corrected 41.9 35 - 45 mm Hg    pO2, Temperature Corrected 83.0 83 - 108 mm Hg   Vancomycin, Random    Specimen: Blood   Result Value Ref Range    Vancomycin Random 6.30 5.00 - 40.00 mcg/mL   Basic Metabolic Panel    Specimen: Blood   Result Value Ref Range    Glucose 83 65 - 99 mg/dL    BUN 25 (H) 6 - 20 mg/dL    Creatinine 0.81 0.57 - 1.00 mg/dL    Sodium 141 136 - 145 mmol/L    Potassium 3.9 3.5 - 5.2 mmol/L    Chloride 111 (H) 98 - 107 mmol/L    CO2 24.0 22.0 - 29.0 mmol/L    Calcium 8.3 (L) 8.6 - 10.5  mg/dL    BUN/Creatinine Ratio 30.9 (H) 7.0 - 25.0    Anion Gap 6.0 5.0 - 15.0 mmol/L    eGFR 87.5 >60.0 mL/min/1.73   CBC (No Diff)    Specimen: Blood   Result Value Ref Range    WBC 7.08 3.40 - 10.80 10*3/mm3    RBC 3.70 (L) 3.77 - 5.28 10*6/mm3    Hemoglobin 11.4 (L) 12.0 - 15.9 g/dL    Hematocrit 35.1 34.0 - 46.6 %    MCV 94.9 79.0 - 97.0 fL    MCH 30.8 26.6 - 33.0 pg    MCHC 32.5 31.5 - 35.7 g/dL    RDW 13.2 12.3 - 15.4 %    RDW-SD 46.0 37.0 - 54.0 fl    MPV 11.9 6.0 - 12.0 fL    Platelets 116 (L) 140 - 450 10*3/mm3   Magnesium    Specimen: Blood   Result Value Ref Range    Magnesium 2.0 1.6 - 2.6 mg/dL   Phosphorus    Specimen: Blood   Result Value Ref Range    Phosphorus 2.5 2.5 - 4.5 mg/dL   POC CHEM 8    Specimen: Blood   Result Value Ref Range    Glucose 100 70 - 130 mg/dL    BUN 16 8 - 26 mg/dL    Creatinine 1.00 0.60 - 1.30 mg/dL    Sodium 142 138 - 146 mmol/L    POC Potassium 3.7 3.5 - 4.9 mmol/L    Chloride 107 98 - 109 mmol/L    Total CO2 22 (L) 24 - 29 mmol/L    Hemoglobin 14.6 12.0 - 17.0 g/dL    Hematocrit 43 38 - 51 %    Ionized Calcium 1.24 1.20 - 1.32 mmol/L    eGFR 67.9 >60.0 mL/min/1.73   POC Protime / INR    Specimen: Blood   Result Value Ref Range    Protime 15.6 (H) 12.8 - 15.2 seconds    INR 1.3 (H) 0.8 - 1.2   POC Glucose Once    Specimen: Blood   Result Value Ref Range    Glucose 195 (H) 70 - 130 mg/dL   POC Glucose Once    Specimen: Blood   Result Value Ref Range    Glucose 167 (H) 70 - 130 mg/dL   POC Glucose Once    Specimen: Blood   Result Value Ref Range    Glucose 129 70 - 130 mg/dL   POC Glucose Once    Specimen: Blood   Result Value Ref Range    Glucose 121 70 - 130 mg/dL   POC Glucose Once    Specimen: Blood   Result Value Ref Range    Glucose 114 70 - 130 mg/dL   POC Glucose Once    Specimen: Blood   Result Value Ref Range    Glucose 142 (H) 70 - 130 mg/dL   POC Glucose Once    Specimen: Blood   Result Value Ref Range    Glucose 139 (H) 70 - 130 mg/dL   POC Glucose Once     Specimen: Blood   Result Value Ref Range    Glucose 104 70 - 130 mg/dL   POC Glucose Once    Specimen: Blood   Result Value Ref Range    Glucose 99 70 - 130 mg/dL   POC Glucose Once    Specimen: Blood   Result Value Ref Range    Glucose 105 70 - 130 mg/dL   POC Glucose Once    Specimen: Blood   Result Value Ref Range    Glucose 93 70 - 130 mg/dL   POC Glucose Once    Specimen: Blood   Result Value Ref Range    Glucose 91 70 - 130 mg/dL   ECG 12 Lead ED Triage Standing Order; Weak / Dizzy / AMS   Result Value Ref Range    QT Interval 338 ms    QTC Interval 469 ms   ECG 12 Lead Rhythm Change   Result Value Ref Range    QT Interval 474 ms    QTC Interval 592 ms   ECG 12 Lead Rhythm Change   Result Value Ref Range    QT Interval 606 ms    QTC Interval 574 ms   Adult Transthoracic Echo Complete W/ Cont if Necessary Per Protocol   Result Value Ref Range    EF(MOD-bp) 62.8 %    LVIDd 4.9 cm    LVIDs 3.0 cm    IVSd 1.00 cm    LVPWd 0.70 cm    FS 38.8 %    IVS/LVPW 1.43 cm    ESV(cubed) 27.0 ml    EDV(cubed) 117.6 ml    LV mass(C)d 141.9 grams    LVOT area 3.1 cm2    LVOT diam 2.00 cm    EDV(MOD-sp2) 97.5 ml    EDV(MOD-sp4) 102.0 ml    ESV(MOD-sp2) 28.9 ml    ESV(MOD-sp4) 40.6 ml    SV(MOD-sp2) 68.6 ml    SV(MOD-sp4) 61.4 ml    EF(MOD-sp2) 70.4 %    EF(MOD-sp4) 60.2 %    MV E max taqueria 85.8 cm/sec    MV A max taqueria 101.0 cm/sec    MV dec time 0.21 sec    MV E/A 0.85     LA ESV Index (BP) 17.0 ml/m2    Med Peak E' Taqueria 10.6 cm/sec    Lat Peak E' Taqueria 8.6 cm/sec    Avg E/e' ratio 8.94     SV(LVOT) 74.1 ml    RV Base 3.3 cm    RV Mid 2.20 cm    RV Length 5.6 cm    TAPSE (>1.6) 2.7 cm    RV S' 15.4 cm/sec    LA dimension (2D)  2.6 cm    LV V1 max 118.0 cm/sec    LV V1 max PG 5.6 mmHg    LV V1 mean PG 3.0 mmHg    LV V1 VTI 23.6 cm    Ao pk taqueria 122.0 cm/sec    Ao max PG 6.0 mmHg    Ao mean PG 3.0 mmHg    Ao V2 VTI 24.1 cm    STEPHANIE(I,D) 3.1 cm2    MV max PG 3.8 mmHg    MV mean PG 1.00 mmHg    MV V2 VTI 29.1 cm    MV P1/2t 69.2 msec     MVA(P1/2t) 3.2 cm2    MVA(VTI) 2.5 cm2    MV dec slope 391.0 cm/sec2    PA V2 max 76.1 cm/sec    PA acc time 0.12 sec    Ao root diam 2.9 cm   Green Top (Gel)   Result Value Ref Range    Extra Tube Hold for add-ons.    Lavender Top   Result Value Ref Range    Extra Tube hold for add-on    Gold Top - SST   Result Value Ref Range    Extra Tube Hold for add-ons.    Gray Top   Result Value Ref Range    Extra Tube Hold for add-ons.    Light Blue Top   Result Value Ref Range    Extra Tube Hold for add-ons.         If labs were ordered, I independently reviewed the results and considered them in treating the patient.      EMERGENCY DEPARTMENT COURSE and DIFFERENTIAL DIAGNOSIS/MDM:   Vitals:  AS OF 15:28 EDT    BP - 157/86  HR - 81  TEMP - 98.2 °F (36.8 °C) (Tympanic)  O2 SATS - 90%        Discussion below represents my analysis of pertinent findings related to patient's condition, differential diagnosis, treatment plan and final disposition.      Differential diagnosis:  The differential diagnosis associated with the patient's presentation includes: CVA, intracranial hemorrhage, intracranial mass, seizure, substance abuse, electrolyte problem, sepsis      Independent interpretations (ECG/rhythm strip/X-ray/US/CT scan): I independently interpreted the pt head CT and cardiac monitor - no hemorrhage and the pt is in NSR      Additional sources:  Discussed/obtained information from independent historians:   [] Spouse:   [] Parent:   [] Friend:   [x] EMS: Report was taken from EMS and is as noted in the HPI   [] Other:  External (non-ED) record review:   [x] Inpatient record: Reviewed prior admission record. Pt was dx w hypertensive emergency.   [] Office record:   [] Outpatient record:   [] Prior Outpatient labs:   [] Prior Outpatient radiology:   [] Primary Care record:   [] Outside ED record:   [] Other:       Patient's care impacted by:   [] Diabetes   [] Hypertension   [] Coronary Artery Disease   [] Cancer   [x] Other: hx  of PNES, conversion disorder    Care significantly affected by Social Determinants of Health (housing and economic circumstances, unemployment)    [] Yes     [x] No   If yes, Patient's care significantly limited by  Social Determinants of Health including:    [] Inadequate housing    [] Low income    [] Alcoholism and drug addiction in family    [] Problems related to primary support group    [] Unemployment    [] Problems related to employment    [] Other Social Determinants of Health:       Consideration of admission/observation vs discharge: Pt presents w/ acute encephalopathy and req admission      I considered prescription management with:    [] Pain medication:   [] Antiviral:   [] Antibiotic:   [x] Other: Pt started on versed drip, loaded w keppra    ED Course:    ED Course as of 04/05/24 1528   Tue Apr 02, 2024   1530 Spoke w [facility] stroke team. Recs performing rest of stroke workup. Will see pt in CT. [NS]   1540 I spoke with intensivist Dr. Dickerson.  I discussed patient history, presentation, workup.  Accepts patient for admission to the ICU. [NS]   1549 This patient presents with acute encephalopathy.  Ultimately, feel that this is likely represents hypertensive emergency/hypertensive encephalopathy.  On arrival, she was completely unresponsive to pain.  She would briefly wake up for seconds at a time and with thrash around to move all extremities but would not speak followed by complete unresponsiveness again with a GCS of 3.  Given this, felt that the safest option was to intubate her for airway protection and to facilitate her workup.  Her noncontrast head CT was negative.  She apparently has a history of seizures and is on Keppra, however prior EEG has been negative and on previous admission it was felt that she likely had some component of psychogenic, nonepileptic seizures.  She is also had hypertensive emergency in the past and has been noted to be noncompliant with her antihypertensive  medications.  Laboratory evaluation that had returned at the time of admission has all been unremarkable. [NS]      ED Course User Index  [NS] Jua nDaniel Garcia MD         PROCEDURES:  Endotracheal Intubation  Indication: GCS 3, inability to protect airway  Consent: Emergent  Pre-procedure exam: Pt fully unresponsive  Preparation: The patient was pre-oxygenated with NRB/BVM with head in the upright position. Stable hemodynamics were ensured. Proper equipment at the bedside including multiple blades, endotracheal tubes, OPA/NPA, ETCO2, suction, bougie.  Medications: Propofol 100 mg, Saturnino 100 mg  Technique: Once adequate paralysis and sedation were attained, the blade was advanced into the oropharynx. The glottis was identified and the tube was advanced into the airway under direct visualization. The tube was secured at 23 cm at the lip. Post intubation O2 sat was 100.  Confirmation: Waveform ETCO2, CXR demonstrating adequate positioning.  Complications: None        CRITICAL CARE  Approximately 35 minutes of discontinuous critical care time was provided to this patient by myself absent of any time spent performing procedures.  Patient presents critically ill with acute encephalopathy/resp failure placing the CV, resp, neuro, renal systems at risk requiring the following interventions: management of sedation, IV AED medications, constant bedside attendtance, interpretpatio of labs/ecg/imaging/blood gas, specialist consultation, coordination of admission.  Patient at high risk of deterioration and possibly death without these interventions.        FINAL IMPRESSION      1. Acute respiratory failure with hypoxia    2. Altered mental status, unspecified altered mental status type    3. Hypertensive emergency    4. History of psychogenic nonepileptic seizure    5. Dysphagia, unspecified type          DISPOSITION/PLAN     ED Disposition       ED Disposition   Decision to Admit    Condition   --    Comment   Level of Care:  Critical Care [6]   Diagnosis: Encephalopathy acute [854129]   Certification: I Certify That Inpatient Hospital Services Are Medically Necessary For Greater Than 2 Midnights                   Comment: Please note this report has been produced using speech recognition software.      Juan Daniel Garcia MD  Attending Emergency Physician             Juan Daniel Garcia MD  04/05/24 1533

## 2024-04-03 ENCOUNTER — APPOINTMENT (OUTPATIENT)
Dept: NEUROLOGY | Facility: HOSPITAL | Age: 54
DRG: 077 | End: 2024-04-03
Payer: MEDICARE

## 2024-04-03 ENCOUNTER — APPOINTMENT (OUTPATIENT)
Dept: MRI IMAGING | Facility: HOSPITAL | Age: 54
DRG: 077 | End: 2024-04-03
Payer: MEDICARE

## 2024-04-03 LAB
ANION GAP SERPL CALCULATED.3IONS-SCNC: 13 MMOL/L (ref 5–15)
ANION GAP SERPL CALCULATED.3IONS-SCNC: 9 MMOL/L (ref 5–15)
ARTERIAL PATENCY WRIST A: ABNORMAL
ATMOSPHERIC PRESS: ABNORMAL MM[HG]
B PARAPERT DNA SPEC QL NAA+PROBE: NOT DETECTED
B PERT DNA SPEC QL NAA+PROBE: NOT DETECTED
BACTERIA UR QL AUTO: ABNORMAL /HPF
BASE EXCESS BLDA CALC-SCNC: -3.3 MMOL/L (ref 0–2)
BDY SITE: ABNORMAL
BILIRUB UR QL STRIP: NEGATIVE
BODY TEMPERATURE: 37
BUN SERPL-MCNC: 20 MG/DL (ref 6–20)
BUN SERPL-MCNC: 24 MG/DL (ref 6–20)
BUN/CREAT SERPL: 16 (ref 7–25)
BUN/CREAT SERPL: 22.9 (ref 7–25)
C PNEUM DNA NPH QL NAA+NON-PROBE: NOT DETECTED
CALCIUM SPEC-SCNC: 8.6 MG/DL (ref 8.6–10.5)
CALCIUM SPEC-SCNC: 8.8 MG/DL (ref 8.6–10.5)
CHLORIDE SERPL-SCNC: 105 MMOL/L (ref 98–107)
CHLORIDE SERPL-SCNC: 110 MMOL/L (ref 98–107)
CLARITY UR: CLEAR
CO2 BLDA-SCNC: 24.9 MMOL/L (ref 22–33)
CO2 SERPL-SCNC: 21 MMOL/L (ref 22–29)
CO2 SERPL-SCNC: 24 MMOL/L (ref 22–29)
COD CRY URNS QL: ABNORMAL /HPF
COHGB MFR BLD: 1.3 % (ref 0–2)
COLOR UR: ABNORMAL
CREAT SERPL-MCNC: 1.05 MG/DL (ref 0.57–1)
CREAT SERPL-MCNC: 1.25 MG/DL (ref 0.57–1)
DEPRECATED RDW RBC AUTO: 45.3 FL (ref 37–54)
EGFRCR SERPLBLD CKD-EPI 2021: 52 ML/MIN/1.73
EGFRCR SERPLBLD CKD-EPI 2021: 64.1 ML/MIN/1.73
EPAP: 0
ERYTHROCYTE [DISTWIDTH] IN BLOOD BY AUTOMATED COUNT: 13.4 % (ref 12.3–15.4)
FLUAV SUBTYP SPEC NAA+PROBE: NOT DETECTED
FLUBV RNA ISLT QL NAA+PROBE: NOT DETECTED
GEN 5 2HR TROPONIN T REFLEX: 89 NG/L
GLUCOSE BLDC GLUCOMTR-MCNC: 114 MG/DL (ref 70–130)
GLUCOSE BLDC GLUCOMTR-MCNC: 121 MG/DL (ref 70–130)
GLUCOSE BLDC GLUCOMTR-MCNC: 129 MG/DL (ref 70–130)
GLUCOSE BLDC GLUCOMTR-MCNC: 142 MG/DL (ref 70–130)
GLUCOSE BLDC GLUCOMTR-MCNC: 167 MG/DL (ref 70–130)
GLUCOSE SERPL-MCNC: 120 MG/DL (ref 65–99)
GLUCOSE SERPL-MCNC: 122 MG/DL (ref 65–99)
GLUCOSE UR STRIP-MCNC: NEGATIVE MG/DL
HADV DNA SPEC NAA+PROBE: NOT DETECTED
HCO3 BLDA-SCNC: 23.4 MMOL/L (ref 20–26)
HCOV 229E RNA SPEC QL NAA+PROBE: NOT DETECTED
HCOV HKU1 RNA SPEC QL NAA+PROBE: NOT DETECTED
HCOV NL63 RNA SPEC QL NAA+PROBE: NOT DETECTED
HCOV OC43 RNA SPEC QL NAA+PROBE: NOT DETECTED
HCT VFR BLD AUTO: 45.3 % (ref 34–46.6)
HCT VFR BLD CALC: 44.4 % (ref 38–51)
HGB BLD-MCNC: 15 G/DL (ref 12–15.9)
HGB BLDA-MCNC: 14.5 G/DL (ref 14–18)
HGB UR QL STRIP.AUTO: ABNORMAL
HMPV RNA NPH QL NAA+NON-PROBE: NOT DETECTED
HPIV1 RNA ISLT QL NAA+PROBE: NOT DETECTED
HPIV2 RNA SPEC QL NAA+PROBE: NOT DETECTED
HPIV3 RNA NPH QL NAA+PROBE: NOT DETECTED
HPIV4 P GENE NPH QL NAA+PROBE: NOT DETECTED
HYALINE CASTS UR QL AUTO: ABNORMAL /LPF
INHALED O2 CONCENTRATION: 45 %
IPAP: 0
KETONES UR QL STRIP: NEGATIVE
LEUKOCYTE ESTERASE UR QL STRIP.AUTO: NEGATIVE
M PNEUMO IGG SER IA-ACNC: NOT DETECTED
MAGNESIUM SERPL-MCNC: 1.9 MG/DL (ref 1.6–2.6)
MAGNESIUM SERPL-MCNC: 2 MG/DL (ref 1.6–2.6)
MCH RBC QN AUTO: 30.2 PG (ref 26.6–33)
MCHC RBC AUTO-ENTMCNC: 33.1 G/DL (ref 31.5–35.7)
MCV RBC AUTO: 91.1 FL (ref 79–97)
METHGB BLD QL: 0.3 % (ref 0–1.5)
MODALITY: ABNORMAL
MUCOUS THREADS URNS QL MICRO: ABNORMAL /HPF
NITRITE UR QL STRIP: NEGATIVE
NT-PROBNP SERPL-MCNC: 2303 PG/ML (ref 0–900)
OXYHGB MFR BLDV: 93.7 % (ref 94–99)
PAW @ PEAK INSP FLOW SETTING VENT: 0 CMH2O
PCO2 BLDA: 47.3 MM HG (ref 35–45)
PCO2 TEMP ADJ BLD: 47.3 MM HG (ref 35–45)
PH BLDA: 7.3 PH UNITS (ref 7.35–7.45)
PH UR STRIP.AUTO: 5.5 [PH] (ref 5–8)
PH, TEMP CORRECTED: 7.3 PH UNITS
PHOSPHATE SERPL-MCNC: 3.6 MG/DL (ref 2.5–4.5)
PHOSPHATE SERPL-MCNC: 5.5 MG/DL (ref 2.5–4.5)
PLATELET # BLD AUTO: 178 10*3/MM3 (ref 140–450)
PMV BLD AUTO: 11.4 FL (ref 6–12)
PO2 BLDA: 75.7 MM HG (ref 83–108)
PO2 TEMP ADJ BLD: 75.7 MM HG (ref 83–108)
POTASSIUM SERPL-SCNC: 4.1 MMOL/L (ref 3.5–5.2)
POTASSIUM SERPL-SCNC: 4.4 MMOL/L (ref 3.5–5.2)
PROT UR QL STRIP: ABNORMAL
RBC # BLD AUTO: 4.97 10*6/MM3 (ref 3.77–5.28)
RBC # UR STRIP: ABNORMAL /HPF
REF LAB TEST METHOD: ABNORMAL
RHINOVIRUS RNA SPEC NAA+PROBE: NOT DETECTED
RSV RNA NPH QL NAA+NON-PROBE: NOT DETECTED
SARS-COV-2 RNA NPH QL NAA+NON-PROBE: NOT DETECTED
SODIUM SERPL-SCNC: 139 MMOL/L (ref 136–145)
SODIUM SERPL-SCNC: 143 MMOL/L (ref 136–145)
SP GR UR STRIP: 1.06 (ref 1–1.03)
SQUAMOUS #/AREA URNS HPF: ABNORMAL /HPF
TOTAL RATE: 0 BREATHS/MINUTE
TROPONIN T DELTA: -11 NG/L
TROPONIN T SERPL HS-MCNC: 100 NG/L
UROBILINOGEN UR QL STRIP: ABNORMAL
VALPROATE SERPL-MCNC: <2.8 MCG/ML (ref 50–125)
WBC # UR STRIP: ABNORMAL /HPF
WBC NRBC COR # BLD AUTO: 12.15 10*3/MM3 (ref 3.4–10.8)
YEAST URNS QL MICRO: ABNORMAL /HPF

## 2024-04-03 PROCEDURE — 84100 ASSAY OF PHOSPHORUS: CPT | Performed by: NURSE PRACTITIONER

## 2024-04-03 PROCEDURE — 25010000002 MAGNESIUM SULFATE 4 GM/100ML SOLUTION: Performed by: NURSE PRACTITIONER

## 2024-04-03 PROCEDURE — 0202U NFCT DS 22 TRGT SARS-COV-2: CPT | Performed by: INTERNAL MEDICINE

## 2024-04-03 PROCEDURE — 82805 BLOOD GASES W/O2 SATURATION: CPT

## 2024-04-03 PROCEDURE — 25010000002 FENTANYL CITRATE (PF) 50 MCG/ML SOLUTION: Performed by: NURSE PRACTITIONER

## 2024-04-03 PROCEDURE — 82375 ASSAY CARBOXYHB QUANT: CPT

## 2024-04-03 PROCEDURE — 70551 MRI BRAIN STEM W/O DYE: CPT

## 2024-04-03 PROCEDURE — 25810000003 SODIUM CHLORIDE 0.9 % SOLUTION 250 ML FLEX CONT: Performed by: NURSE PRACTITIONER

## 2024-04-03 PROCEDURE — 25010000002 MIDAZOLAM PER 1 MG

## 2024-04-03 PROCEDURE — 93005 ELECTROCARDIOGRAM TRACING: CPT | Performed by: NURSE PRACTITIONER

## 2024-04-03 PROCEDURE — 94640 AIRWAY INHALATION TREATMENT: CPT

## 2024-04-03 PROCEDURE — 93005 ELECTROCARDIOGRAM TRACING: CPT

## 2024-04-03 PROCEDURE — 83735 ASSAY OF MAGNESIUM: CPT | Performed by: INTERNAL MEDICINE

## 2024-04-03 PROCEDURE — 94799 UNLISTED PULMONARY SVC/PX: CPT

## 2024-04-03 PROCEDURE — 25010000002 MIDAZOLAM PER 1 MG: Performed by: NURSE PRACTITIONER

## 2024-04-03 PROCEDURE — 25010000002 PHENYLEPHRINE 10 MG/ML SOLUTION 5 ML VIAL: Performed by: NURSE PRACTITIONER

## 2024-04-03 PROCEDURE — 87040 BLOOD CULTURE FOR BACTERIA: CPT | Performed by: INTERNAL MEDICINE

## 2024-04-03 PROCEDURE — 80164 ASSAY DIPROPYLACETIC ACD TOT: CPT | Performed by: NURSE PRACTITIONER

## 2024-04-03 PROCEDURE — 94664 DEMO&/EVAL PT USE INHALER: CPT

## 2024-04-03 PROCEDURE — 85027 COMPLETE CBC AUTOMATED: CPT | Performed by: NURSE PRACTITIONER

## 2024-04-03 PROCEDURE — 93010 ELECTROCARDIOGRAM REPORT: CPT | Performed by: STUDENT IN AN ORGANIZED HEALTH CARE EDUCATION/TRAINING PROGRAM

## 2024-04-03 PROCEDURE — 25010000002 PHENYLEPHRINE 10 MG/ML SOLUTION 5 ML VIAL: Performed by: INTERNAL MEDICINE

## 2024-04-03 PROCEDURE — 25010000002 FENTANYL 10 MCG/1 ML NS: Performed by: INTERNAL MEDICINE

## 2024-04-03 PROCEDURE — 84100 ASSAY OF PHOSPHORUS: CPT | Performed by: INTERNAL MEDICINE

## 2024-04-03 PROCEDURE — 25010000002 CEFEPIME PER 500 MG: Performed by: INTERNAL MEDICINE

## 2024-04-03 PROCEDURE — 84484 ASSAY OF TROPONIN QUANT: CPT

## 2024-04-03 PROCEDURE — 25810000003 LACTATED RINGERS PER 1000 ML

## 2024-04-03 PROCEDURE — 25810000003 SODIUM CHLORIDE 0.9 % SOLUTION 250 ML FLEX CONT: Performed by: INTERNAL MEDICINE

## 2024-04-03 PROCEDURE — 99233 SBSQ HOSP IP/OBS HIGH 50: CPT | Performed by: PSYCHIATRY & NEUROLOGY

## 2024-04-03 PROCEDURE — 80048 BASIC METABOLIC PNL TOTAL CA: CPT | Performed by: INTERNAL MEDICINE

## 2024-04-03 PROCEDURE — 83880 ASSAY OF NATRIURETIC PEPTIDE: CPT

## 2024-04-03 PROCEDURE — 80048 BASIC METABOLIC PNL TOTAL CA: CPT | Performed by: NURSE PRACTITIONER

## 2024-04-03 PROCEDURE — 83050 HGB METHEMOGLOBIN QUAN: CPT

## 2024-04-03 PROCEDURE — 94003 VENT MGMT INPAT SUBQ DAY: CPT

## 2024-04-03 PROCEDURE — 83735 ASSAY OF MAGNESIUM: CPT | Performed by: NURSE PRACTITIONER

## 2024-04-03 PROCEDURE — 87186 SC STD MICRODIL/AGAR DIL: CPT | Performed by: INTERNAL MEDICINE

## 2024-04-03 PROCEDURE — 99291 CRITICAL CARE FIRST HOUR: CPT | Performed by: INTERNAL MEDICINE

## 2024-04-03 PROCEDURE — 95819 EEG AWAKE AND ASLEEP: CPT

## 2024-04-03 PROCEDURE — 82948 REAGENT STRIP/BLOOD GLUCOSE: CPT

## 2024-04-03 PROCEDURE — 95819 EEG AWAKE AND ASLEEP: CPT | Performed by: PSYCHIATRY & NEUROLOGY

## 2024-04-03 PROCEDURE — 36600 WITHDRAWAL OF ARTERIAL BLOOD: CPT

## 2024-04-03 PROCEDURE — 81001 URINALYSIS AUTO W/SCOPE: CPT | Performed by: INTERNAL MEDICINE

## 2024-04-03 PROCEDURE — 25010000002 VANCOMYCIN HCL IN NACL 2-0.9 GM/500ML-% SOLUTION: Performed by: INTERNAL MEDICINE

## 2024-04-03 PROCEDURE — 87070 CULTURE OTHR SPECIMN AEROBIC: CPT | Performed by: INTERNAL MEDICINE

## 2024-04-03 PROCEDURE — 87205 SMEAR GRAM STAIN: CPT | Performed by: INTERNAL MEDICINE

## 2024-04-03 RX ORDER — PANTOPRAZOLE SODIUM 40 MG/10ML
40 INJECTION, POWDER, LYOPHILIZED, FOR SOLUTION INTRAVENOUS
Status: DISCONTINUED | OUTPATIENT
Start: 2024-04-04 | End: 2024-04-06

## 2024-04-03 RX ORDER — SODIUM CHLORIDE, SODIUM LACTATE, POTASSIUM CHLORIDE, CALCIUM CHLORIDE 600; 310; 30; 20 MG/100ML; MG/100ML; MG/100ML; MG/100ML
75 INJECTION, SOLUTION INTRAVENOUS CONTINUOUS
Status: ACTIVE | OUTPATIENT
Start: 2024-04-03 | End: 2024-04-04

## 2024-04-03 RX ORDER — VANCOMYCIN/0.9 % SOD CHLORIDE 1.5G/250ML
1500 PLASTIC BAG, INJECTION (ML) INTRAVENOUS EVERY 24 HOURS
Status: DISCONTINUED | OUTPATIENT
Start: 2024-04-04 | End: 2024-04-05

## 2024-04-03 RX ORDER — MIDAZOLAM HYDROCHLORIDE 1 MG/ML
2 INJECTION INTRAMUSCULAR; INTRAVENOUS ONCE
Status: COMPLETED | OUTPATIENT
Start: 2024-04-04 | End: 2024-04-03

## 2024-04-03 RX ORDER — BISACODYL 5 MG/1
5 TABLET, DELAYED RELEASE ORAL DAILY PRN
Status: DISCONTINUED | OUTPATIENT
Start: 2024-04-03 | End: 2024-04-06

## 2024-04-03 RX ORDER — BISACODYL 10 MG
10 SUPPOSITORY, RECTAL RECTAL DAILY PRN
Status: DISCONTINUED | OUTPATIENT
Start: 2024-04-03 | End: 2024-04-06

## 2024-04-03 RX ORDER — MIDAZOLAM HYDROCHLORIDE 1 MG/ML
2 INJECTION INTRAMUSCULAR; INTRAVENOUS EVERY 4 HOURS PRN
Status: DISCONTINUED | OUTPATIENT
Start: 2024-04-03 | End: 2024-04-06

## 2024-04-03 RX ORDER — NOREPINEPHRINE BITARTRATE 0.03 MG/ML
.02-.3 INJECTION, SOLUTION INTRAVENOUS
Status: DISCONTINUED | OUTPATIENT
Start: 2024-04-03 | End: 2024-04-06

## 2024-04-03 RX ORDER — VANCOMYCIN 2 GRAM/500 ML IN 0.9 % SODIUM CHLORIDE INTRAVENOUS
2000 ONCE
Status: COMPLETED | OUTPATIENT
Start: 2024-04-03 | End: 2024-04-03

## 2024-04-03 RX ORDER — MAGNESIUM SULFATE HEPTAHYDRATE 40 MG/ML
4 INJECTION, SOLUTION INTRAVENOUS ONCE
Status: COMPLETED | OUTPATIENT
Start: 2024-04-03 | End: 2024-04-03

## 2024-04-03 RX ORDER — DEXMEDETOMIDINE HYDROCHLORIDE 4 UG/ML
.2-1.5 INJECTION, SOLUTION INTRAVENOUS
Status: DISCONTINUED | OUTPATIENT
Start: 2024-04-03 | End: 2024-04-04

## 2024-04-03 RX ORDER — ASPIRIN 81 MG/1
324 TABLET, CHEWABLE ORAL ONCE
Status: COMPLETED | OUTPATIENT
Start: 2024-04-03 | End: 2024-04-03

## 2024-04-03 RX ORDER — POLYETHYLENE GLYCOL 3350 17 G/17G
17 POWDER, FOR SOLUTION ORAL DAILY PRN
Status: DISCONTINUED | OUTPATIENT
Start: 2024-04-03 | End: 2024-04-06

## 2024-04-03 RX ORDER — ACETAMINOPHEN 160 MG/5ML
650 SOLUTION ORAL EVERY 6 HOURS PRN
Status: DISCONTINUED | OUTPATIENT
Start: 2024-04-03 | End: 2024-04-06

## 2024-04-03 RX ORDER — FENTANYL CITRATE 50 UG/ML
25 INJECTION, SOLUTION INTRAMUSCULAR; INTRAVENOUS
Status: DISPENSED | OUTPATIENT
Start: 2024-04-03 | End: 2024-04-04

## 2024-04-03 RX ORDER — IPRATROPIUM BROMIDE AND ALBUTEROL SULFATE 2.5; .5 MG/3ML; MG/3ML
3 SOLUTION RESPIRATORY (INHALATION)
Status: DISCONTINUED | OUTPATIENT
Start: 2024-04-03 | End: 2024-04-08 | Stop reason: HOSPADM

## 2024-04-03 RX ORDER — AMOXICILLIN 250 MG
2 CAPSULE ORAL 2 TIMES DAILY
Status: DISCONTINUED | OUTPATIENT
Start: 2024-04-03 | End: 2024-04-06

## 2024-04-03 RX ADMIN — NOREPINEPHRINE BITARTRATE 0.02 MCG/KG/MIN: 0.03 INJECTION, SOLUTION INTRAVENOUS at 20:00

## 2024-04-03 RX ADMIN — PHENYLEPHRINE HYDROCHLORIDE 1 MCG/KG/MIN: 10 INJECTION INTRAVENOUS at 18:07

## 2024-04-03 RX ADMIN — SENNOSIDES AND DOCUSATE SODIUM 2 TABLET: 8.6; 5 TABLET ORAL at 20:13

## 2024-04-03 RX ADMIN — ACETAMINOPHEN 650 MG: 650 SOLUTION ORAL at 15:03

## 2024-04-03 RX ADMIN — IPRATROPIUM BROMIDE AND ALBUTEROL SULFATE 3 ML: 2.5; .5 SOLUTION RESPIRATORY (INHALATION) at 20:00

## 2024-04-03 RX ADMIN — MIDAZOLAM HYDROCHLORIDE 2 MG: 1 INJECTION, SOLUTION INTRAMUSCULAR; INTRAVENOUS at 16:22

## 2024-04-03 RX ADMIN — MAGNESIUM SULFATE HEPTAHYDRATE 4 G: 40 INJECTION, SOLUTION INTRAVENOUS at 20:00

## 2024-04-03 RX ADMIN — Medication 300 MCG/HR: at 03:37

## 2024-04-03 RX ADMIN — ASPIRIN 324 MG: 81 TABLET, CHEWABLE ORAL at 19:59

## 2024-04-03 RX ADMIN — CEFEPIME 2000 MG: 2 INJECTION, POWDER, FOR SOLUTION INTRAVENOUS at 19:59

## 2024-04-03 RX ADMIN — SODIUM CHLORIDE, POTASSIUM CHLORIDE, SODIUM LACTATE AND CALCIUM CHLORIDE 75 ML/HR: 600; 310; 30; 20 INJECTION, SOLUTION INTRAVENOUS at 15:02

## 2024-04-03 RX ADMIN — Medication 10 ML: at 08:32

## 2024-04-03 RX ADMIN — FENTANYL CITRATE 25 MCG: 50 INJECTION, SOLUTION INTRAMUSCULAR; INTRAVENOUS at 23:56

## 2024-04-03 RX ADMIN — Medication 10 ML: at 20:13

## 2024-04-03 RX ADMIN — MIDAZOLAM HYDROCHLORIDE 2 MG: 1 INJECTION, SOLUTION INTRAMUSCULAR; INTRAVENOUS at 23:56

## 2024-04-03 RX ADMIN — IPRATROPIUM BROMIDE AND ALBUTEROL SULFATE 3 ML: 2.5; .5 SOLUTION RESPIRATORY (INHALATION) at 12:01

## 2024-04-03 RX ADMIN — PHENYLEPHRINE HYDROCHLORIDE 0.5 MCG/KG/MIN: 10 INJECTION INTRAVENOUS at 07:21

## 2024-04-03 RX ADMIN — DEXMEDETOMIDINE HYDROCHLORIDE 0.2 MCG/KG/HR: 4 INJECTION, SOLUTION INTRAVENOUS at 18:10

## 2024-04-03 RX ADMIN — IPRATROPIUM BROMIDE AND ALBUTEROL SULFATE 3 ML: 2.5; .5 SOLUTION RESPIRATORY (INHALATION) at 15:46

## 2024-04-03 RX ADMIN — Medication 2000 MG: at 11:56

## 2024-04-03 RX ADMIN — CEFEPIME 2000 MG: 2 INJECTION, POWDER, FOR SOLUTION INTRAVENOUS at 11:19

## 2024-04-03 RX ADMIN — ACETAMINOPHEN 650 MG: 650 SOLUTION ORAL at 04:49

## 2024-04-03 NOTE — PROGRESS NOTES
Neurology       Patient Care Team:  Coby Ely PA-C as PCP - General (Physician Assistant)  Carolina Tello MD as Consulting Physician (Neurology)  Elliott Hunt MD as Consulting Physician (Gastroenterology)  Ryder Delarosa III, MD as Cardiologist (Cardiology)  Mary Canales APRN as Nurse Practitioner (Psychiatry)    Chief complaint: Intubated    History: Briefly the patient was found down at home with a coma scale 6 and was intubated on the way to the hospital for airway protection.    Ulnar delete that on arrival her blood pressure was 237/120.    Is treated with Cardene and dropped to 180/81.    With initiation of sedation for the ventilator which included fentanyl and Versed patient's blood pressure dropped to 75/63.    She apparently vomited somewhere in the midst of all this.    She subsequently developed a fever.    He has a past history of migraine headaches and psychogenic nonepileptic seizures.    She is scheduled for brain tumor surgery but no evidence of surgery has been noted on her scans.      Past Medical History:   Diagnosis Date    Abnormal Pap smear of cervix     Allergic rhinitis     Asthma     Atypical chest pain     Brain tumor     Status post brain tumor resection in 1989.    Candidiasis of mouth     Cervical high risk HPV (human papillomavirus) test positive 09/01/2020    CKD (chronic kidney disease), stage III     Colon polyps 09/09/2020    sessile and tubular adenoma    Conversion disorder     COPD (chronic obstructive pulmonary disease)     COVID-19     Elevated liver enzymes     Former smoker 07/05/2023    H/O Helicobacter infection      Status post EGD 9/4/2012, pathology revealed H. pylori infection.    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     Neurology evaluation thought it was numbness from carpal tunnel and not Guillain-[place]    Hyperlipidemia     Hypertension     Influenza     Internal hemorrhoids      Low grade squamous intraepith lesion on cytologic smear cervix (lgsil) 09/01/2020    Median neuropathy     Migraine     PTSD (post-traumatic stress disorder)     Seizures     2 seizures after surgery    Small fiber neuropathy     Stroke     Tinea corporis        Vital Signs   Vitals:    04/03/24 1130 04/03/24 1145 04/03/24 1200 04/03/24 1215   BP: 107/70 105/70 108/71 114/72   BP Location:       Patient Position:       Pulse: 71 65 75 78   Resp:       Temp:       TempSrc:       SpO2: 93% 90% 92% 93%   Weight:       Height:           Physical Exam:   General: Intubated              Neuro: Opens eyes moves a bit but does not follow commands.    She is currently being weaned from fentanyl and is off Versed.    Pupils are pinpoint.  Eyes are conjugate.    Face is symmetrical.    Does not move spontaneously.    Muscle tone is normal in both upper and lower extremities.    Deep tendon reflexes are plus minus throughout.        Results Review:  MRI was personally reviewed and shows minimal white matter change.    Chest x-ray yesterday was normal.    CT angiogram of the head and neck as well as perfusion scan were unremarkable.        Results from last 7 days   Lab Units 04/03/24  0432   WBC 10*3/mm3 12.15*   HEMOGLOBIN g/dL 15.0   HEMATOCRIT % 45.3   PLATELETS 10*3/mm3 178     Results from last 7 days   Lab Units 04/03/24  0432 04/02/24  1507 04/02/24  1501   SODIUM mmol/L 139 136  --    POTASSIUM mmol/L 4.4 3.8  --    CHLORIDE mmol/L 105 104  --    CO2 mmol/L 21.0* 23.0  --    BUN mg/dL 20 15  --    CREATININE mg/dL 1.25* 0.82 1.00   CALCIUM mg/dL 8.8 9.3  --    BILIRUBIN mg/dL  --  0.3  --    ALK PHOS U/L  --  106  --    ALT (SGPT) U/L  --  11  --    AST (SGOT) U/L  --  13  --    GLUCOSE mg/dL 120* 104*  --        Imaging Results (Last 24 Hours)       Procedure Component Value Units Date/Time    MRI Brain Without Contrast [[id number]] Collected: 04/03/24 0304     Updated: 04/03/24 0308    Narrative:      MRI BRAIN WO  CONTRAST    Date of Exam: 4/3/2024 2:30 AM EDT    Indication: Stroke, follow up  unresponsiveness.     Comparison: 4/2/2024.    Technique:  Routine multiplanar/multisequence sequence images of the brain were obtained without contrast administration.      Findings:  There is no diffusion restriction to suggest acute infarct. There is no evidence of acute or chronic intracranial hemorrhage. No mass effect or midline shift. No abnormal extra-axial collections. The major vascular flow voids appear intact. The mild   periventricular and subcortical FLAIR signal changes are present. Basal ganglia, brainstem and cerebellum appear within normal limits. Calvarial and superficial soft tissue signal is within normal limits. Orbits appear unremarkable. The paranasal sinuses   and the mastoid air cells appear well aerated. Midline structures are intact.         Impression:      Impression:    1. No evidence of hemorrhage, mass effect or midline shift. No evidence of recent or acute ischemia.  2. Mild periventricular and subcortical FLAIR signal changes likely related to chronic microvascular ischemic change.        Electronically Signed: Shanthi Dai MD    4/3/2024 3:04 AM EDT    Workstation ID: [id number]    XR Chest 1 View [[id number]] Collected: 04/02/24 1614     Updated: 04/02/24 1619    Narrative:      XR CHEST 1 VW    Date of Exam: 4/2/2024 4:08 PM EDT    Indication: Weak/Dizzy/AMS triage protocol    Comparison: 1/9/2024    Findings:  ET tube is seen in good position midway between the clavicles and yolie. NG tube is seen passing below the left hemidiaphragm. The heart mediastinum and pulmonary vasculature appear within normal limits. Lungs appear well expanded and clear bilaterally.      Impression:      Impression:    1. ET tube and NG tube appear to be in satisfactory position.    2. No evidence of active chest disease is seen.      Electronically Signed: Jonathan Gonzalez MD    4/2/2024 4:16 PM EDT    Workstation ID: [id number]     XR Abdomen KUB [[id number]] Collected: 04/02/24 1610     Updated: 04/02/24 1614    Narrative:      XR ABDOMEN KUB    Date of Exam: 4/2/2024 3:59 PM EDT    Indication: NG    Comparison: None available.    Findings:  Nasogastric tube terminates proximally in the stomach with the proximal side port near the GE junction.      Impression:      Impression:  Nasogastric tube terminates proximally in the stomach with the proximal side port near the GE junction.        Electronically Signed: Shaquille Avalos MD    4/2/2024 4:11 PM EDT    Workstation ID: [id number]    CT Angiogram Head w AI Analysis of LVO [[id number]] Collected: 04/02/24 1558     Updated: 04/02/24 1612    Narrative:      CT ANGIOGRAM NECK, CT ANGIOGRAM HEAD W AI ANALYSIS OF LVO    Date of Exam: 4/2/2024 3:31 PM EDT    Indication: stroke workup/unresponsive.    Comparison: 1/9/2024    Technique: CTA of the neck was performed before and after the uneventful intravenous administration of 115 cc Isovue-370. Reconstructed coronal and sagittal images were also obtained. In addition, a 3-D volume rendered image was created for   interpretation. Automated exposure control and iterative reconstruction methods were used.    Findings:    CTA NECK:  *Aortic arch: No aneurysm. No significant stenosis or occlusion of the major arch vessels.  *Left carotid system: Minimal proximal atherosclerotic disease. No aneurysm, significant stenosis or occlusion.  *Right carotid system: Minimal proximal atherosclerotic disease. No aneurysm, significant stenosis or occlusion.  *Vertebrobasilar system: The vertebral arteries arise from their respective subclavian arteries. No aneurysm, significant stenosis or occlusion.    *There is an endotracheal tube, tip between the thoracic inlet and yolie. There is a nasogastric tube present.    CTA HEAD:  *Anterior circulation: No aneurysm, significant stenosis or occlusion.  *Posterior circulation: No aneurysm, significant stenosis or  occlusion.  *Anatomic variants: None of significance.  *Venous sinuses: Patent.      Impression:      1.No hemodynamically significant stenosis, large vessel cut or aneurysms in intracranial circulation  2.No hemodynamically significant stenosis, dissection or aneurysms in extracranial circulation.           Electronically Signed: Shlomo Bal MD    4/2/2024 4:07 PM EDT    Workstation ID: [id number]    CT Angiogram Neck [[id number]] Collected: 04/02/24 1558     Updated: 04/02/24 1612    Narrative:      CT ANGIOGRAM NECK, CT ANGIOGRAM HEAD W AI ANALYSIS OF LVO    Date of Exam: 4/2/2024 3:31 PM EDT    Indication: stroke workup/unresponsive.    Comparison: 1/9/2024    Technique: CTA of the neck was performed before and after the uneventful intravenous administration of 115 cc Isovue-370. Reconstructed coronal and sagittal images were also obtained. In addition, a 3-D volume rendered image was created for   interpretation. Automated exposure control and iterative reconstruction methods were used.    Findings:    CTA NECK:  *Aortic arch: No aneurysm. No significant stenosis or occlusion of the major arch vessels.  *Left carotid system: Minimal proximal atherosclerotic disease. No aneurysm, significant stenosis or occlusion.  *Right carotid system: Minimal proximal atherosclerotic disease. No aneurysm, significant stenosis or occlusion.  *Vertebrobasilar system: The vertebral arteries arise from their respective subclavian arteries. No aneurysm, significant stenosis or occlusion.    *There is an endotracheal tube, tip between the thoracic inlet and yolie. There is a nasogastric tube present.    CTA HEAD:  *Anterior circulation: No aneurysm, significant stenosis or occlusion.  *Posterior circulation: No aneurysm, significant stenosis or occlusion.  *Anatomic variants: None of significance.  *Venous sinuses: Patent.      Impression:      1.No hemodynamically significant stenosis, large vessel cut or aneurysms in intracranial  circulation  2.No hemodynamically significant stenosis, dissection or aneurysms in extracranial circulation.           Electronically Signed: Shlomo Bal MD    4/2/2024 4:07 PM EDT    Workstation ID: [id number]    CT CEREBRAL PERFUSION WITH & WITHOUT CONTRAST [[id number]] Collected: 04/02/24 1553     Updated: 04/02/24 1612    Narrative:      CT CEREBRAL PERFUSION W WO CONTRAST    Date of Exam: 4/2/2024 3:31 PM EDT    Indication: Neuro deficit, acute, stroke suspected  stroke workup/unresponsive.     Comparison: 1/9/2024    Technique: Axial CT images of the brain were obtained prior to and after the administration of 115 cc Isovue-370. Core blood volume, core blood flow, mean transit time, and Tmax images were obtained utilizing the Rapid software protocol. A limited CT   angiogram of the head was also performed to measure the blood vessel density.    The radiation dose reduction device was turned on for each scan per the ALARA (As Low as Reasonably Achievable) protocol.      Findings:             Cerebral blood flow, blood volume, and mean transit time maps are symmetric without large perfusion defect.     CBF<30% volume: 0mL  Tmax>6sec volume: 0 mL  Mismatch volume: 0mL  Mismatch ratio: None                Impression:         1. No evidence of core infarct nor ischemic brain at risk.        Electronically Signed: Shlomo Bal MD    4/2/2024 3:58 PM EDT    Workstation ID: [id number]    CT Head Without Contrast Stroke Protocol [[id number]] Collected: 04/02/24 1531     Updated: 04/02/24 1536    Narrative:      CT HEAD WO CONTRAST STROKE PROTOCOL    Date of Exam: 4/2/2024 3:19 PM EDT    Indication: ams.    Comparison: Head CT 1/9/2024    Technique: Axial CT images were obtained of the head without contrast administration.  Reconstructed coronal images were also obtained. Automated exposure control and iterative construction methods were used.    Scan Time: 3:26 p.m. 4/2/2024  Results discussed with Dr. Garcia by Dr. Martin  Sandip in person at the CT scanner at 3:28 p.m 4/2/2024.      Findings:  No acute intracranial hemorrhage. No acute large territory infarct. No extra-axial collections. No mass effect. Normal size and configuration of the ventricles. Unremarkable appearance of the orbits. Paranasal sinuses and mastoid air cells are clear.   Partial visualization of nasogastric tube; endotracheal tube is noted on the  image.      Impression:      Impression:  No acute intracranial findings.        Electronically Signed: Mario Oropeza MD    4/2/2024 3:33 PM EDT    Workstation ID: [id number]            Assessment:  Altered mental state likely hypertensive encephalopathy secondary to malignant hypertension versus hypertensive induced grand mal seizure    Low-grade fever,?  Aspiration pneumonia    Plan:  EEG    Hold Keppra    Wean from vent    Comment:  Will know more when the patient is off the vent.         I discussed the patients findings and my recommendations with nursing staff    Fredy Montague MD  04/03/24  13:50 EDT

## 2024-04-03 NOTE — PAYOR COMM NOTE
"ID: [id number]   : 1970    Encephalopathy acute [G93.40]   Ted Dickerson MD (NPI: [id number])     Utilization Review  Phone [phone]  Fax [phone]    [place] [id number]         Arcelia Quintero (53 y.o. Female)       Date of Birth   1970    Social Security Number       Address   [address] [place] [address]    Home Phone   [phone]    MRN   [id number]       [facility]    Marital Status   Legally                             Admission Date   24    Admission Type   Emergency    Admitting Provider   Case, Jenae BROCK DO    Attending Provider   Case, Jenae BROCK DO    Department, Room/Bed   [facility] 2B ICU, N235/1       Discharge Date       Discharge Disposition       Discharge Destination                                 Attending Provider: Jenae Garcia DO    Allergies: Codeine, Influenza Vaccines, Propofol    Isolation: None   Infection: COVID (rule out) (24)   Code Status: Prior    Ht: 170.2 cm (67\")   Wt: 73 kg (160 lb 15 oz)    Admission Cmt: None   Principal Problem: Encephalopathy acute [G93.40]                   Active Insurance as of 2024       Primary Coverage       Payor Plan Insurance Group Employer/Plan Group    [facility] MEDICARE REPLACEMENT WELLCARE MED ADV SNP HMO        Payor Plan Address Payor Plan Phone Number Payor Plan Fax Number Effective Dates    [address] [address]   2023 - None Entered    [facility] [address]         Subscriber Name Subscriber Birth Date Member ID       ARCELIA QUINTERO 1970 [id number]               Secondary Coverage       Payor Plan Insurance Group Employer/Plan Group    [facility]        Payor Plan Address Payor Plan Phone Number Payor Plan Fax Number Effective Dates    [address] [address] [phone]  8/3/2023 - None Entered    [facility] [address]         Subscriber Name Subscriber Birth Date Member ID       " "ARCELIA QUINTERO 1970 [id number]                     Emergency Contacts        (Rel.) Home Phone Work Phone Mobile Phone    CODY GIL (Daughter) -- -- [phone]    Lainey Gil (Daughter) [phone] -- --    Beverley Gil (Daughter) -- -- [phone]              Song: [facility] [id number] Tax ID [id number]  Insurance Information                  [facility]WELLCARE MED ADV SNP HMO Phone: --    Subscriber: Arcelia Quintero Subscriber#: [id number]    Group#: -- Precert#: --        WELLCARE [facility]/WELLCARE MEDICAID Phone: [phone]    Subscriber: Arcelia Quintero Subscriber#: [id number]    Group#: -- Precert#: --             History & Physical        Ted Dickerson MD at 04/02/24 1552            INTENSIVIST   INITIAL HOSPITAL VISIT NOTE     Chief Complaint: Found unresponsive by family    Subjective  S   Arcelia Quintero is a 53 y.o. female who presents to [facility] ED 04/02/24 with suspected CVA.     Patient has a PMH of tobacco use, emphysema/COPD, chronic migraines on Topamax, seizure disorder including nonepileptic seizures (follows with Dr. 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     Per report, patient has been feeling unwell for the past several days with intermittent episodes of \"blacking out.\" Her LKW was ~1400 today at which time she told her daughter she did not feel well and went to lay down for a nap. Upon going to check in on her later this afternoon found her unresponsive, prompting call to EMS. Upon their arrival she was additionally noted to be significantly hypertensive ('s) and was brought to our ED for further evaluation.     Upon ED arrival initial VS as follows: Temp 97.4 F, HR 84, /120 mmHg, RR 16, and SpO2 95% on RA, however was intubated for airway protection. Post-intubation ABG with " hypercapnia. CXR with ETT in good position and was negative for cardiopulmonary process. Labs unremarkable with UA and UDS negative. CTH was negative for an acute intracranial abnormality with subsequent CTA H/N negative for flow limiting stenosis and CTP negative for LVO.     She was evaluated by Neurology who recommended Keppra load and STAT MRI. Ms. Quintero will be admitted to ICU for further management.     Time spent: 10 minutes  Electronically signed by Gerri Gastelum, LOUIS, APRN, 04/02/24, 4:48 PM EDT.     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   PSxH: She  has a past surgical history that includes Brain surgery; Colonoscopy; Upper gastrointestinal endoscopy; Liver biopsy; and Cardiac catheterization (N/A, 12/27/2023).      Medications:  No current facility-administered medications on file prior to encounter.     Current Outpatient Medications on File Prior to Encounter   Medication Sig    amLODIPine (NORVASC) 5 MG tablet Take 1 tablet by mouth Daily.    aspirin 81 MG EC tablet Take 1 tablet by mouth Daily.    atorvastatin (LIPITOR) 40 MG tablet Take 1 tablet by mouth Daily.    azithromycin (Zithromax Z-Jeremy) 250 MG tablet Take 2 tablets by mouth on day 1,  then 1 tablet daily on days 2-5    benzonatate (TESSALON) 200 MG capsule Take 1 capsule by mouth 3 (Three) Times a Day As Needed for Cough.    Budeson-Glycopyrrol-Formoterol (BREZTRI) 160-9-4.8 MCG/ACT aerosol inhaler Inhale 2 puffs 2 (Two) Times a Day.    carvedilol (COREG) 12.5 MG tablet Take 1 tablet by mouth 2 (Two) Times a Day With Meals.    FLUoxetine (PROzac) 20 MG capsule Take 1 capsule by mouth Daily.    lactulose (CHRONULAC) 10 GM/15ML solution Take 45 mL by mouth 2 (Two) Times a Day.    levETIRAcetam (Keppra) 100 MG/ML solution Take 5 mL by mouth 2 (Two) Times a Day.    linaclotide (Linzess) 145 MCG capsule capsule Take 1 capsule by mouth Every Morning Before Breakfast.    methylPREDNISolone (MEDROL) 4 MG dose pack Take as directed on package instructions.    mirtazapine (REMERON) 7.5 MG tablet Take 2 tablets by mouth Every Night.    pantoprazole (PROTONIX) 40 MG EC tablet TAKE 1 TABLET BY MOUTH DAILY    rizatriptan (Maxalt) 10 MG tablet Take 1 tablet at onset of headache.  May repeat once in 2 hours.  Do not take more than 2 tabs a day or 8 tabs a month    topiramate (TOPAMAX) 25 MG tablet TAKE ONE TABLET BY MOUTH ONCE NIGHTLY FOR 7 DAYS THEN TAKE 1 TABLET BY MOUTH TWICE A DAY FOR 7 DAYS THEN TAKE ONE TABLET BY MOUTH EVERY MORNING AND 2 TABLETS AT NIGHT FOR 7 DAYS (Patient taking differently: Take 1 tablet by mouth 2 (Two) Times a Day. TAKE ONE TABLET BY MOUTH ONCE NIGHTLY FOR 7 DAYS THEN TAKE 1 TABLET BY MOUTH TWICE A DAY FOR 7 DAYS THEN TAKE ONE TABLET BY MOUTH EVERY MORNING AND 2 TABLETS AT NIGHT FOR 7 DAYS)     Allergies: She is allergic to codeine, influenza vaccines, and propofol.   FH: Her family history includes Breast cancer in an other family member; Colon cancer in an other family member; Diabetes in an other family member; Heart disease in her mother; Lung disease in her mother; No Known Problems in her brother and sister; Ovarian cancer in her mother; Prostate cancer in her father; Stroke in  "her mother; Throat cancer in her mother; Uterine cancer in her mother. She was adopted.   SH: She  reports that she has been smoking cigarettes. She started smoking about 43 years ago. She has a 20.9 pack-year smoking history. She has been exposed to tobacco smoke. She has never used smokeless tobacco. She reports that she does not drink alcohol and does not use drugs.     The patient's relevant past medical, surgical and social history were reviewed and updated in Epic as appropriate.     Objective  O     Medications (drips):  midazolam, Last Rate: 1 mg/hr (04/02/24 1513)  niCARdipine    Physical Examination:  Vital Signs: Blood pressure (!) 171/128, pulse 96, temperature 97.4 °F (36.3 °C), temperature source Axillary, resp. rate 16, height 170.2 cm (67\"), weight 65.8 kg (145 lb), SpO2 99%, not currently breastfeeding.    General: Appears comfortable on mechanical ventilation.  Hypertensive, tachycardic.  Afebrile.  Chest: Clear to auscultation bilaterally, No wheezing, rhonchi, or rales.  Appropriate oxygen saturations on mechanical ventilation.  Cardiac: Tachycardic.  S1S2 auscultated. No murmurs, rubs or gallops.   Abdomen: Soft, non-tender, non-distended, positive bowel sounds in all four quadrants.   Extremities: No lower extremity edema. No clubbing or cyanosis.  Neuro: Intubated, sedated.  Does not wince to noxious stimuli.    Lines, Drains & Airways       Active LDAs       Name Placement date Placement time Site Days    Peripheral IV 04/02/24 Anterior;Distal;Left;Upper Arm 04/02/24  --  Arm  less than 1    Peripheral IV Right Antecubital --  --  Antecubital  --    Peripheral IV 04/02/24 Left Antecubital 04/02/24  --  Antecubital  less than 1    NG/OG Tube Nasogastric 16 Fr Right nostril 04/02/24  1510  Right nostril  less than 1    Urethral Catheter 16 Fr. 04/02/24  1513  -- less than 1    ETT  04/02/24  1507  -- less than 1        Results from last 7 days   Lab Units 04/02/24  1510 04/02/24  1501   WBC " 10*3/mm3 6.33  --    HEMOGLOBIN g/dL 15.3  --    HEMOGLOBIN, POC g/dL  --  14.6   MCV fL 90.1  --    PLATELETS 10*3/mm3 186  --      Results from last 7 days   Lab Units 04/02/24  1507 04/02/24  1501   SODIUM mmol/L 136  --    POTASSIUM mmol/L 3.8  --    CO2 mmol/L 23.0  --    CREATININE mg/dL 0.82 1.00   MAGNESIUM mg/dL 2.1  --      Estimated Creatinine Clearance: 83.4 mL/min (by C-G formula based on SCr of 0.82 mg/dL).  Results from last 7 days   Lab Units 04/02/24  1507   ALK PHOS U/L 106   BILIRUBIN mg/dL 0.3   ALT (SGPT) U/L 11   AST (SGOT) U/L 13     Respiratory (ie nasal cannula, HFNC, BiPAP, mechanical ventilation settings) support: MV    Images:  XR Chest 1 View    Result Date: 4/2/2024  Impression: 1. ET tube and NG tube appear to be in satisfactory position. 2. No evidence of active chest disease is seen. Electronically Signed: Jonathan Gonzalez MD  4/2/2024 4:16 PM EDT  Workstation ID: [id number]    XR Abdomen KUB    Result Date: 4/2/2024  Impression: Nasogastric tube terminates proximally in the stomach with the proximal side port near the GE junction. Electronically Signed: Shaquille Avalos MD  4/2/2024 4:11 PM EDT  Workstation ID: [id number]    CT Angiogram Head w AI Analysis of LVO    Result Date: 4/2/2024  1.No hemodynamically significant stenosis, large vessel cut or aneurysms in intracranial circulation 2.No hemodynamically significant stenosis, dissection or aneurysms in extracranial circulation. Electronically Signed: Shlomo Bal MD  4/2/2024 4:07 PM EDT  Workstation ID: [id number]    CT Angiogram Neck    Result Date: 4/2/2024  1.No hemodynamically significant stenosis, large vessel cut or aneurysms in intracranial circulation 2.No hemodynamically significant stenosis, dissection or aneurysms in extracranial circulation. Electronically Signed: Shlomo Bal MD  4/2/2024 4:07 PM EDT  Workstation ID: [id number]    CT CEREBRAL PERFUSION WITH & WITHOUT CONTRAST    Result Date: 4/2/2024   1. No evidence of core  infarct nor ischemic brain at risk. Electronically Signed: Shlomo Bal MD  4/2/2024 3:58 PM EDT  Workstation ID: [id number]    CT Head Without Contrast Stroke Protocol    Result Date: 4/2/2024  Impression: No acute intracranial findings. Electronically Signed: Mario Oropeza MD  4/2/2024 3:33 PM EDT  Workstation ID: [id number]     I reviewed the patient's new laboratory and imaging results.  I independently reviewed the patient's new images.    Assessment & Plan   A / P     Active Hospital Problems    Diagnosis     **Encephalopathy acute     Acute respiratory failure with hypercapnia     Hypertensive emergency     H/O PNES     CKD (chronic kidney disease), stage III     Emphysema/COPD     Anxiety and depression     Dyslipidemia     GERD (gastroesophageal reflux disease)      #Encephalopathy  -Etiology of obtundation unclear at this time.  Differential diagnosis includes CVA/TIA, seizure, toxic, press, CNS infection.  Stroke/neurology team following given acute onset of symptoms. Preliminary imaging inconsistent with acute CVA but will be admitted to the neuro ICU for close hemodynamic monitoring, neurochecks, etc  -Lactate, TSH WNL. UDS + urine fentanyl as well as ethanol, salicylate and acetaminophen negative. Patient afebrile with normal WBC-- making infection less likely. Procalcitonin pending. UA normal   -Intubated for airway protection in the ER (4/02)  -MRI pending     #Acute hypoxic respiratory failure requiring intubation/mechanical ventilation  -Chiefly intubated for airway protection. Adjusting mechanical ventilator based on clinical exam and serial ABG     #HTN Emergency  - Cardene gtt if needed for SBP >220    #PNES  -Loaded with Keppra in the ED. Questionably on Keppra at home. Will need to confirm with patient when extubated   -Repeat EEG pending    Pre-existing conditions:   #Anxiety/depression/PTSD  #Constipation  #GERD  #HLD  #Emphysema/COPD  -Continue home medications when clinically appropriate      F-NPO  A-Fentanyl  S-Versed  T-SCDs   H-Head of bed greater than 30 degrees  U-NA  G-FSBS per unit protocol, correction dose insulin  S-NA  B-Will monitor and provide regimen if indicated  I-PIV  D-NA    Plan of care and goals reviewed during interdisciplinary rounds.  I discussed the patient's findings and my recommendations with patient, nursing staff, and consulting provider    Time: Critical Care time spent in direct patient care: 50 minutes (excluding procedure time, if applicable) including high complexity decision making to assess, manipulate, and support vital organ system failure in this individual who has impairment of one or more vital organ systems such that there is a high probability of imminent or life threatening deterioration in the patient’s condition.     -- Terrance Dickerson MD  Pulmonary/Critical Care    Electronically signed by Ted Dickerson MD at 04/02/24 1826       Emergency Department Notes    No notes of this type exist for this encounter.       Facility-Administered Medications as of 4/3/2024   Medication Dose Route Frequency Provider Last Rate Last Admin    acetaminophen (TYLENOL) 160 MG/5ML oral solution 650 mg  650 mg Nasogastric Q6H PRN Juan Daniel Ballard APRN   650 mg at 04/03/24 0449    sennosides-docusate (PERICOLACE) 8.6-50 MG per tablet 2 tablet  2 tablet Nasogastric BID Weant, Pancho, RPH        And    polyethylene glycol (MIRALAX) packet 17 g  17 g Nasogastric Daily PRN Weant, Pancho, RPH        And    bisacodyl (DULCOLAX) EC tablet 5 mg  5 mg Oral Daily PRN Weant, Pancho, RPH        And    bisacodyl (DULCOLAX) suppository 10 mg  10 mg Rectal Daily PRN Weant, Pancho, RPH        Calcium Replacement - Follow Nurse / BPA Driven Protocol   Does not apply PRN Ted Dickerson MD        cefepime 2000 mg IVPB in 100 mL NS (MBP)  2,000 mg Intravenous Once Case, Jenae V., DO        cefepime 2000 mg IVPB in 100 mL NS (MBP)  2,000 mg Intravenous Q8H Case, Jenae V., DO        fentaNYL  (SUBLIMAZE) bolus from bag 10 mcg/mL injection 50 mcg  50 mcg Intravenous Q30 Min PRN Ted Dickerson MD   50 mcg at 04/03/24 0620    fentaNYL 2500 mcg/250 mL NS infusion   mcg/hr Intravenous Titrated Ted Dickerson MD 10 mL/hr at 04/03/24 1009 100 mcg/hr at 04/03/24 1009    [COMPLETED] iopamidol (ISOVUE-370) 76 % injection 115 mL  115 mL Intravenous Once in imaging Juan Daniel Garcia MD   115 mL at 04/02/24 1548    ipratropium-albuterol (DUO-NEB) nebulizer solution 3 mL  3 mL Nebulization 4x Daily - RT Case, Jenae BROCK, DO        [COMPLETED] levETIRAcetam (KEPPRA) injection 1,500 mg  1,500 mg Intravenous Once Juan Daniel Garcia MD   1,500 mg at 04/02/24 1554    Magnesium Standard Dose Replacement - Follow Nurse / BPA Driven Protocol   Does not apply PRN Ted Dickerson MD        midazolam (VERSED) 100 mg in 100mL NS infusion  1-10 mg/hr Intravenous Titrated Juan Daniel Garcia MD 1 mL/hr at 04/03/24 0629 1 mg/hr at 04/03/24 0629    niCARdipine (CARDENE) 20 mg in 200 mL NS infusion  5-15 mg/hr Intravenous Titrated Amara Valle APRN        nitroglycerin (NITROSTAT) SL tablet 0.4 mg  0.4 mg Sublingual Q5 Min PRN Ted Dickerson MD        ondansetron (ZOFRAN) injection 4 mg  4 mg Intravenous Q6H PRN Juan Daniel Ballard APRN   4 mg at 04/02/24 2056    phenylephrine (WAQAR-SYNEPHRINE) 50 mg in sodium chloride 0.9 % 250 mL infusion  0.5-3 mcg/kg/min Intravenous Titrated Juan Daniel Ballard APRN 21.9 mL/hr at 04/03/24 1009 1 mcg/kg/min at 04/03/24 1009    Phosphorus Replacement - Follow Nurse / BPA Driven Protocol   Does not apply PRN Ted Dickerson MD        Potassium Replacement - Follow Nurse / BPA Driven Protocol   Does not apply PRN Ted Dickerson MD        [COMPLETED] Propofol (DIPRIVAN) injection   Intravenous Code / Trauma / Sedation Medication Juan Daniel Garcia MD   100 mg at 04/02/24 1505    [COMPLETED] rocuronium (ZEMURON) injection    Code / Trauma / Sedation Medication Juan Daniel Garcia MD   100  mg at 04/02/24 1505    sodium chloride 0.9 % flush 10 mL  10 mL Intravenous PRN Juan Daniel Garcia MD        sodium chloride 0.9 % flush 10 mL  10 mL Intravenous Q12H Ted Dickerson MD   10 mL at 04/03/24 0832    sodium chloride 0.9 % flush 10 mL  10 mL Intravenous PRN Ted Dickerson MD        sodium chloride 0.9 % infusion 40 mL  40 mL Intravenous PRN Ted Dickerson MD        vancomycin IVPB 2000 mg in 0.9% Sodium Chloride 500 mL  2,000 mg Intravenous Once Case, Jenae V., DO         Lab Results (all)       Procedure Component Value Units Date/Time    Urinalysis, Microscopic Only - Urine, Catheter [[id number]] Collected: 04/03/24 1025    Specimen: Urine, Catheter Updated: 04/03/24 1031    Respiratory Panel PCR w/COVID-19(SARS-CoV-2) YENY/SHALOM/KIMMIE/PAD/COR/LUKAS In-House, NP Swab in UTM/VTM, 2 HR TAT - Swab, Nasopharynx [[id number]] Collected: 04/03/24 0840    Specimen: Swab from Nasopharynx Updated: 04/03/24 1031    Respiratory Culture - Aspirate, ET Suction [[id number]] Collected: 04/03/24 1031    Specimen: Aspirate from ET Suction Updated: 04/03/24 1031    Urinalysis With Culture If Indicated - Urine, Catheter [[id number]] Collected: 04/03/24 1025    Specimen: Urine, Catheter Updated: 04/03/24 1026    Blood Culture - Blood, Hand, Left [[id number]] Collected: 04/03/24 0930    Specimen: Blood from Hand, Left Updated: 04/03/24 1007    Blood Culture - Blood, Arm, Left [[id number]] Collected: 04/03/24 0940    Specimen: Blood from Arm, Left Updated: 04/03/24 1007    POC Glucose Once [[id number]]  (Normal) Collected: 04/03/24 0615    Specimen: Blood Updated: 04/03/24 0639     Glucose 129 mg/dL     Valproic Acid Level, Total [[id number]]  (Abnormal) Collected: 04/03/24 0432    Specimen: Blood Updated: 04/03/24 0629     Valproic Acid <2.8 mcg/mL     Narrative:      Therapeutic Ranges for Valproic Acid    Epilepsy:       mcg/ml  Bipolar/Daphnie  up to 125 mcg/ml      Basic Metabolic Panel [[id number]]  (Abnormal) Collected:  04/03/24 0432    Specimen: Blood Updated: 04/03/24 0552     Glucose 120 mg/dL      BUN 20 mg/dL      Creatinine 1.25 mg/dL      Sodium 139 mmol/L      Potassium 4.4 mmol/L      Chloride 105 mmol/L      CO2 21.0 mmol/L      Calcium 8.8 mg/dL      BUN/Creatinine Ratio 16.0     Anion Gap 13.0 mmol/L      eGFR 52.0 mL/min/1.73     Narrative:      GFR Normal >60  Chronic Kidney Disease <60  Kidney Failure <15      Magnesium [[id number]]  (Normal) Collected: 04/03/24 0432    Specimen: Blood Updated: 04/03/24 0552     Magnesium 2.0 mg/dL     Phosphorus [[id number]]  (Abnormal) Collected: 04/03/24 0432    Specimen: Blood Updated: 04/03/24 0542     Phosphorus 5.5 mg/dL     CBC (No Diff) [[id number]]  (Abnormal) Collected: 04/03/24 0432    Specimen: Blood Updated: 04/03/24 0531     WBC 12.15 10*3/mm3      RBC 4.97 10*6/mm3      Hemoglobin 15.0 g/dL      Hematocrit 45.3 %      MCV 91.1 fL      MCH 30.2 pg      MCHC 33.1 g/dL      RDW 13.4 %      RDW-SD 45.3 fl      MPV 11.4 fL      Platelets 178 10*3/mm3     POC Glucose Once [[id number]]  (Abnormal) Collected: 04/03/24 0011    Specimen: Blood Updated: 04/03/24 0019     Glucose 167 mg/dL     [place] Draw [[id number]] Collected: 04/02/24 1507    Specimen: Blood Updated: 04/02/24 1915    Narrative:      The following orders were created for panel order [place] Draw.  Procedure                               Abnormality         Status                     ---------                               -----------         ------                     Green Top (Gel)[[id number]]                                  Final result               Lavender Top[[id number]]                                     Final result               Gold Top - SST[[id number]]                                   Final result               Person Top[[id number]]                                         Final result               Light Blue Top[[id number]]                                   Final result                 Please view  "results for these tests on the individual orders.    Gray Top [[id number]] Collected: 04/02/24 1510    Specimen: Blood Updated: 04/02/24 1915     Extra Tube Hold for add-ons.     Comment: Auto resulted.       Procalcitonin [[id number]]  (Normal) Collected: 04/02/24 1510    Specimen: Blood Updated: 04/02/24 1805     Procalcitonin 0.04 ng/mL     Narrative:      As a Marker for Sepsis (Non-Neonates):    1. <0.5 ng/mL represents a low risk of severe sepsis and/or septic shock.  2. >2 ng/mL represents a high risk of severe sepsis and/or septic shock.    As a Marker for Lower Respiratory Tract Infections that require antibiotic therapy:    PCT on Admission    Antibiotic Therapy       6-12 Hrs later    >0.5                Strongly Recommended  >0.25 - <0.5        Recommended   0.1 - 0.25          Discouraged              Remeasure/reassess PCT  <0.1                Strongly Discouraged     Remeasure/reassess PCT    As 28 day mortality risk marker: \"Change in Procalcitonin Result\" (>80% or <=80%) if Day 0 (or Day 1) and Day 4 values are available. Refer to http://www.[facility]-pct-calculator.com    Change in PCT <=80%  A decrease of PCT levels below or equal to 80% defines a positive change in PCT test result representing a higher risk for 28-day all-cause mortality of patients diagnosed with severe sepsis for septic shock.    Change in PCT >80%  A decrease of PCT levels of more than 80% defines a negative change in PCT result representing a lower risk for 28-day all-cause mortality of patients diagnosed with severe sepsis or septic shock.       Lipase [[id number]]  (Normal) Collected: 04/02/24 1510    Specimen: Blood Updated: 04/02/24 1753     Lipase 43 U/L     POC Glucose Once [[id number]]  (Abnormal) Collected: 04/02/24 1724    Specimen: Blood Updated: 04/02/24 1725     Glucose 195 mg/dL     Blood Gas, Arterial With Co-Ox [[id number]]  (Abnormal) Collected: 04/02/24 1635    Specimen: Arterial Blood Updated: 04/02/24 1635     Site " Right Radial     Gian's Test N/A     pH, Arterial 7.258 pH units      Comment: 84 Value below reference range        pCO2, Arterial 49.3 mm Hg      Comment: 83 Value above reference range        pO2, Arterial 228.0 mm Hg      Comment: 83 Value above reference range        HCO3, Arterial 22.0 mmol/L      Base Excess, Arterial -5.6 mmol/L      Hemoglobin, Blood Gas 16.2 g/dL      Hematocrit, Blood Gas 49.6 %      Oxyhemoglobin 96.4 %      Methemoglobin 0.40 %      Carboxyhemoglobin 3.2 %      Comment: 83 Value above reference range        CO2 Content 23.5 mmol/L      Temperature 37.0     Barometric Pressure for Blood Gas --     Comment: N/A        Modality Ventilator     FIO2 100 %      Ventilator Mode VC+/AC     Set Tidal Volume 0.45     Rate 14 Breaths/minute      PEEP 5.0     PIP 0 cmH2O      Comment: Meter: [id number]     :  [id number]        IPAP 0     EPAP 0     pH, Temp Corrected 7.258 pH Units      pCO2, Temperature Corrected 49.3 mm Hg      pO2, Temperature Corrected 228 mm Hg     Lactic Acid, Plasma [[id number]]  (Normal) Collected: 04/02/24 1510    Specimen: Blood Updated: 04/02/24 1617     Lactate 1.2 mmol/L      Comment: Falsely depressed results may occur on samples drawn from patients receiving N-Acetylcysteine (NAC) or Metamizole.       Gold Top - SST [[id number]] Collected: 04/02/24 1510    Specimen: Blood Updated: 04/02/24 1615     Extra Tube Hold for add-ons.     Comment: Auto resulted.       Green Top (Gel) [[id number]] Collected: 04/02/24 1507    Specimen: Blood Updated: 04/02/24 1615     Extra Tube Hold for add-ons.     Comment: Auto resulted.       Lavender Top [[id number]] Collected: 04/02/24 1510    Specimen: Blood Updated: 04/02/24 1615     Extra Tube hold for add-on     Comment: Auto resulted       Light Blue Top [[id number]] Collected: 04/02/24 1507    Specimen: Blood Updated: 04/02/24 1615     Extra Tube Hold for add-ons.     Comment: Auto resulted       Fentanyl, Urine - Urine,  Clean Catch [[id number]]  (Normal) Collected: 04/02/24 1513    Specimen: Urine, Clean Catch Updated: 04/02/24 1550     Fentanyl, Urine Negative    Narrative:      Negative Threshold:      Fentanyl 5 ng/mL     The normal value for the drug tested is negative. This report includes final unconfirmed screening results to be used for medical treatment purposes only. Unconfirmed results must not be used for non-medical purposes such as employment or legal testing. Clinical consideration should be applied to any drug of abuse test, particularly when unconfirmed results are used.           TSH [[id number]]  (Normal) Collected: 04/02/24 1510    Specimen: Blood Updated: 04/02/24 1546     TSH 1.850 uIU/mL     T4, Free [[id number]]  (Abnormal) Collected: 04/02/24 1510    Specimen: Blood Updated: 04/02/24 1546     Free T4 0.83 ng/dL     Acetaminophen Level [[id number]]  (Normal) Collected: 04/02/24 1510    Specimen: Blood Updated: 04/02/24 1541     Acetaminophen <5.0 mcg/mL     Salicylate Level [[id number]]  (Normal) Collected: 04/02/24 1510    Specimen: Blood Updated: 04/02/24 1541     Salicylate <0.3 mg/dL     Comprehensive Metabolic Panel [[id number]]  (Abnormal) Collected: 04/02/24 1507    Specimen: Blood Updated: 04/02/24 1539     Glucose 104 mg/dL      BUN 15 mg/dL      Creatinine 0.82 mg/dL      Sodium 136 mmol/L      Potassium 3.8 mmol/L      Comment: Slight hemolysis detected by analyzer. Result may be falsely elevated.        Chloride 104 mmol/L      CO2 23.0 mmol/L      Calcium 9.3 mg/dL      Total Protein 6.9 g/dL      Albumin 4.2 g/dL      ALT (SGPT) 11 U/L      AST (SGOT) 13 U/L      Alkaline Phosphatase 106 U/L      Total Bilirubin 0.3 mg/dL      Globulin 2.7 gm/dL      Comment: Calculated Result        A/G Ratio 1.6 g/dL      BUN/Creatinine Ratio 18.3     Anion Gap 9.0 mmol/L      eGFR 86.2 mL/min/1.73     Narrative:      GFR Normal >60  Chronic Kidney Disease <60  Kidney Failure <15      Magnesium [[id number]]   (Normal) Collected: 04/02/24 1507    Specimen: Blood Updated: 04/02/24 1539     Magnesium 2.1 mg/dL     Ethanol [[id number]]  (Normal) Collected: 04/02/24 1510    Specimen: Blood Updated: 04/02/24 1539     Ethanol <10 mg/dL     Narrative:      Elevated lactic acid concentration and lactate dehydrogenase(LD) activity may falsely elevate enzymatically determined ethanol levels. Not for legal purposes.     CK [[id number]]  (Normal) Collected: 04/02/24 1510    Specimen: Blood Updated: 04/02/24 1539     Creatine Kinase 52 U/L     Urine Drug Screen - Urine, Clean Catch [[id number]]  (Normal) Collected: 04/02/24 1513    Specimen: Urine, Clean Catch Updated: 04/02/24 1535     THC, Screen, Urine Negative     Phencyclidine (PCP), Urine Negative     Cocaine Screen, Urine Negative     Methamphetamine, Ur Negative     Opiate Screen Negative     Amphetamine Screen, Urine Negative     Benzodiazepine Screen, Urine Negative     Tricyclic Antidepressants Screen Negative     Methadone Screen, Urine Negative     Barbiturates Screen, Urine Negative     Oxycodone Screen, Urine Negative     Buprenorphine, Screen, Urine Negative    Narrative:      Cutoff For Drugs Screened:    Amphetamines               500 ng/ml  Barbiturates               200 ng/ml  Benzodiazepines            150 ng/ml  Cocaine                    150 ng/ml  Methadone                  200 ng/ml  Opiates                    100 ng/ml  Phencyclidine               25 ng/ml  THC                         50 ng/ml  Methamphetamine            500 ng/ml  Tricyclic Antidepressants  300 ng/ml  Oxycodone                  100 ng/ml  Buprenorphine               10 ng/ml    The normal value for all drugs tested is negative. This report includes unconfirmed screening results, with the cutoff values listed, to be used for medical treatment purposes only.  Unconfirmed results must not be used for non-medical purposes such as employment or legal testing.  Clinical consideration should be  applied to any drug of abuse test, particularly when unconfirmed results are used.      Single High Sensitivity Troponin T [[id number]]  (Normal) Collected: 04/02/24 1507    Specimen: Blood Updated: 04/02/24 1533     HS Troponin T <6 ng/L     Narrative:      High Sensitive Troponin T Reference Range:  <14.0 ng/L- Negative Female for AMI  <22.0 ng/L- Negative Male for AMI  >=14 - Abnormal Female indicating possible myocardial injury.  >=22 - Abnormal Male indicating possible myocardial injury.   Clinicians would have to utilize clinical acumen, EKG, Troponin, and serial changes to determine if it is an Acute Myocardial Infarction or myocardial injury due to an underlying chronic condition.         Urinalysis With Microscopic If Indicated (No Culture) - Urine, Clean Catch [[id number]]  (Normal) Collected: 04/02/24 1513    Specimen: Urine, Clean Catch Updated: 04/02/24 1522     Color, UA Yellow     Appearance, UA Clear     pH, UA 6.0     Specific Gravity, UA 1.018     Glucose, UA Negative     Ketones, UA Negative     Bilirubin, UA Negative     Blood, UA Negative     Protein, UA Negative     Leuk Esterase, UA Negative     Nitrite, UA Negative     Urobilinogen, UA 0.2 E.U./dL    Narrative:      Urine microscopic not indicated.    CBC & Differential [[id number]]  (Normal) Collected: 04/02/24 1510    Specimen: Blood Updated: 04/02/24 1516    Narrative:      The following orders were created for panel order CBC & Differential.  Procedure                               Abnormality         Status                     ---------                               -----------         ------                     CBC Auto Differential[[id number]]        Normal              Final result                 Please view results for these tests on the individual orders.    CBC Auto Differential [[id number]]  (Normal) Collected: 04/02/24 1510    Specimen: Blood Updated: 04/02/24 1516     WBC 6.33 10*3/mm3      RBC 4.94 10*6/mm3      Hemoglobin 15.3  g/dL      Hematocrit 44.5 %      MCV 90.1 fL      MCH 31.0 pg      MCHC 34.4 g/dL      RDW 13.4 %      RDW-SD 44.5 fl      MPV 11.4 fL      Platelets 186 10*3/mm3      Neutrophil % 44.2 %      Lymphocyte % 42.2 %      Monocyte % 9.3 %      Eosinophil % 3.3 %      Basophil % 0.8 %      Immature Grans % 0.2 %      Neutrophils, Absolute 2.80 10*3/mm3      Lymphocytes, Absolute 2.67 10*3/mm3      Monocytes, Absolute 0.59 10*3/mm3      Eosinophils, Absolute 0.21 10*3/mm3      Basophils, Absolute 0.05 10*3/mm3      Immature Grans, Absolute 0.01 10*3/mm3      nRBC 0.0 /100 WBC     POC Protime / INR [[id number]]  (Abnormal) Collected: 04/02/24 1500    Specimen: Blood Updated: 04/02/24 1504     Protime 15.6 seconds      INR 1.3     Comment: Serial Number: [id number]:  [id number]       POC CHEM 8 [[id number]]  (Abnormal) Collected: 04/02/24 1501    Specimen: Blood Updated: 04/02/24 1503     Glucose 100 mg/dL      BUN 16 mg/dL      Creatinine 1.00 mg/dL      Sodium 142 mmol/L      POC Potassium 3.7 mmol/L      Chloride 107 mmol/L      Total CO2 22 mmol/L      Hemoglobin 14.6 g/dL      Comment: Serial Number: [id number]:  [id number]        Hematocrit 43 %      Ionized Calcium 1.24 mmol/L      eGFR 67.9 mL/min/1.73           Imaging Results (All)       Procedure Component Value Units Date/Time    MRI Brain Without Contrast [[id number]] Collected: 04/03/24 0304     Updated: 04/03/24 0308    Narrative:      MRI BRAIN WO CONTRAST    Date of Exam: 4/3/2024 2:30 AM EDT    Indication: Stroke, follow up  unresponsiveness.     Comparison: 4/2/2024.    Technique:  Routine multiplanar/multisequence sequence images of the brain were obtained without contrast administration.      Findings:  There is no diffusion restriction to suggest acute infarct. There is no evidence of acute or chronic intracranial hemorrhage. No mass effect or midline shift. No abnormal extra-axial collections. The major vascular flow voids appear intact. The mild    periventricular and subcortical FLAIR signal changes are present. Basal ganglia, brainstem and cerebellum appear within normal limits. Calvarial and superficial soft tissue signal is within normal limits. Orbits appear unremarkable. The paranasal sinuses   and the mastoid air cells appear well aerated. Midline structures are intact.         Impression:      Impression:    1. No evidence of hemorrhage, mass effect or midline shift. No evidence of recent or acute ischemia.  2. Mild periventricular and subcortical FLAIR signal changes likely related to chronic microvascular ischemic change.        Electronically Signed: Shanthi Dai MD    4/3/2024 3:04 AM EDT    Workstation ID: [id number]    XR Chest 1 View [[id number]] Collected: 04/02/24 1614     Updated: 04/02/24 1619    Narrative:      XR CHEST 1 VW    Date of Exam: 4/2/2024 4:08 PM EDT    Indication: Weak/Dizzy/AMS triage protocol    Comparison: 1/9/2024    Findings:  ET tube is seen in good position midway between the clavicles and yolie. NG tube is seen passing below the left hemidiaphragm. The heart mediastinum and pulmonary vasculature appear within normal limits. Lungs appear well expanded and clear bilaterally.      Impression:      Impression:    1. ET tube and NG tube appear to be in satisfactory position.    2. No evidence of active chest disease is seen.      Electronically Signed: Jonathan Gonzalez MD    4/2/2024 4:16 PM EDT    Workstation ID: [id number]    XR Abdomen KUB [[id number]] Collected: 04/02/24 1610     Updated: 04/02/24 1614    Narrative:      XR ABDOMEN KUB    Date of Exam: 4/2/2024 3:59 PM EDT    Indication: NG    Comparison: None available.    Findings:  Nasogastric tube terminates proximally in the stomach with the proximal side port near the GE junction.      Impression:      Impression:  Nasogastric tube terminates proximally in the stomach with the proximal side port near the GE junction.        Electronically Signed: Shaquille Avalos MD     4/2/2024 4:11 PM EDT    Workstation ID: [id number]    CT Angiogram Head w AI Analysis of LVO [[id number]] Collected: 04/02/24 1558     Updated: 04/02/24 1612    Narrative:      CT ANGIOGRAM NECK, CT ANGIOGRAM HEAD W AI ANALYSIS OF LVO    Date of Exam: 4/2/2024 3:31 PM EDT    Indication: stroke workup/unresponsive.    Comparison: 1/9/2024    Technique: CTA of the neck was performed before and after the uneventful intravenous administration of 115 cc Isovue-370. Reconstructed coronal and sagittal images were also obtained. In addition, a 3-D volume rendered image was created for   interpretation. Automated exposure control and iterative reconstruction methods were used.    Findings:    CTA NECK:  *Aortic arch: No aneurysm. No significant stenosis or occlusion of the major arch vessels.  *Left carotid system: Minimal proximal atherosclerotic disease. No aneurysm, significant stenosis or occlusion.  *Right carotid system: Minimal proximal atherosclerotic disease. No aneurysm, significant stenosis or occlusion.  *Vertebrobasilar system: The vertebral arteries arise from their respective subclavian arteries. No aneurysm, significant stenosis or occlusion.    *There is an endotracheal tube, tip between the thoracic inlet and yolie. There is a nasogastric tube present.    CTA HEAD:  *Anterior circulation: No aneurysm, significant stenosis or occlusion.  *Posterior circulation: No aneurysm, significant stenosis or occlusion.  *Anatomic variants: None of significance.  *Venous sinuses: Patent.      Impression:      1.No hemodynamically significant stenosis, large vessel cut or aneurysms in intracranial circulation  2.No hemodynamically significant stenosis, dissection or aneurysms in extracranial circulation.           Electronically Signed: Shlomo Bal MD    4/2/2024 4:07 PM EDT    Workstation ID: [id number]    CT Angiogram Neck [[id number]] Collected: 04/02/24 1558     Updated: 04/02/24 1612    Narrative:      CT ANGIOGRAM  NECK, CT ANGIOGRAM HEAD W AI ANALYSIS OF LVO    Date of Exam: 4/2/2024 3:31 PM EDT    Indication: stroke workup/unresponsive.    Comparison: 1/9/2024    Technique: CTA of the neck was performed before and after the uneventful intravenous administration of 115 cc Isovue-370. Reconstructed coronal and sagittal images were also obtained. In addition, a 3-D volume rendered image was created for   interpretation. Automated exposure control and iterative reconstruction methods were used.    Findings:    CTA NECK:  *Aortic arch: No aneurysm. No significant stenosis or occlusion of the major arch vessels.  *Left carotid system: Minimal proximal atherosclerotic disease. No aneurysm, significant stenosis or occlusion.  *Right carotid system: Minimal proximal atherosclerotic disease. No aneurysm, significant stenosis or occlusion.  *Vertebrobasilar system: The vertebral arteries arise from their respective subclavian arteries. No aneurysm, significant stenosis or occlusion.    *There is an endotracheal tube, tip between the thoracic inlet and yolie. There is a nasogastric tube present.    CTA HEAD:  *Anterior circulation: No aneurysm, significant stenosis or occlusion.  *Posterior circulation: No aneurysm, significant stenosis or occlusion.  *Anatomic variants: None of significance.  *Venous sinuses: Patent.      Impression:      1.No hemodynamically significant stenosis, large vessel cut or aneurysms in intracranial circulation  2.No hemodynamically significant stenosis, dissection or aneurysms in extracranial circulation.           Electronically Signed: Shlomo Bal MD    4/2/2024 4:07 PM EDT    Workstation ID: [id number]    CT CEREBRAL PERFUSION WITH & WITHOUT CONTRAST [[id number]] Collected: 04/02/24 1553     Updated: 04/02/24 1612    Narrative:      CT CEREBRAL PERFUSION W WO CONTRAST    Date of Exam: 4/2/2024 3:31 PM EDT    Indication: Neuro deficit, acute, stroke suspected  stroke workup/unresponsive.     Comparison:  1/9/2024    Technique: Axial CT images of the brain were obtained prior to and after the administration of 115 cc Isovue-370. Core blood volume, core blood flow, mean transit time, and Tmax images were obtained utilizing the Rapid software protocol. A limited CT   angiogram of the head was also performed to measure the blood vessel density.    The radiation dose reduction device was turned on for each scan per the ALARA (As Low as Reasonably Achievable) protocol.      Findings:             Cerebral blood flow, blood volume, and mean transit time maps are symmetric without large perfusion defect.     CBF<30% volume: 0mL  Tmax>6sec volume: 0 mL  Mismatch volume: 0mL  Mismatch ratio: None                Impression:         1. No evidence of core infarct nor ischemic brain at risk.        Electronically Signed: Shlomo Bal MD    4/2/2024 3:58 PM EDT    Workstation ID: [id number]    CT Head Without Contrast Stroke Protocol [[id number]] Collected: 04/02/24 1531     Updated: 04/02/24 1536    Narrative:      CT HEAD WO CONTRAST STROKE PROTOCOL    Date of Exam: 4/2/2024 3:19 PM EDT    Indication: ams.    Comparison: Head CT 1/9/2024    Technique: Axial CT images were obtained of the head without contrast administration.  Reconstructed coronal images were also obtained. Automated exposure control and iterative construction methods were used.    Scan Time: 3:26 p.m. 4/2/2024  Results discussed with Dr. Garcia by Dr. Mario Oropeza in person at the CT scanner at 3:28 p.m 4/2/2024.      Findings:  No acute intracranial hemorrhage. No acute large territory infarct. No extra-axial collections. No mass effect. Normal size and configuration of the ventricles. Unremarkable appearance of the orbits. Paranasal sinuses and mastoid air cells are clear.   Partial visualization of nasogastric tube; endotracheal tube is noted on the  image.      Impression:      Impression:  No acute intracranial findings.        Electronically Signed:  "Mario Oropeza MD    2024 3:33 PM EDT    Workstation ID: [id number]          Ventilator/Non-Invasive Ventilation Settings (From admission, onward)      None          Physician Progress Notes (all)    No notes of this type exist for this encounter.          Consult Notes (all)        Amara Valle APRN at 24 1611          Stroke Consult Note    Patient Name: Arcelia Quintero   MRN: [id number]  Age: 53 y.o.  Sex: female  : 1970    Primary Care Physician: Coby Ely PA-C  Referring Physician: Dr. Garcia    TIME STROKE TEAM CALLED: 15:28 EST     TIME PATIENT SEEN: 15:35 EST    Handedness: Right  Race:      Chief Complaint/Reason for Consultation: Unresponsiveness    Subjective .  HPI: Arcelia Quintero is a 53-year-old, , right-handed female with known diagnosis of HTN, HLD, migraines, conversion disorder, history of seizure disorder including nonepileptic seizures, history of brain tumor s/p resection in  (data deficient, no evidence of this on CT head), COPD, stage III CKD, elevated liver enzymes, and current everyday smoker who presented with unresponsiveness.  Apparently patient was last known well around 1400 when she told her daughter she did not feel well and went to lay down.  When her daughter went to check on her she was found completely unresponsive for which her daughter called EMS.  Upon arrival to [facility] patient reportedly had a GCS of 6 and was completely unresponsive to painful stimuli, unresponsive to sternal rub.  Initial SBP  > 250.  Per ED provider she periodically opened her eyes and looked around the room, she was able to move all extremities but not to command, she did speak saying \"I do not feel well,\" before becoming unresponsive once again.  This happened at least 3 times.  Patient was intubated for airway protection, she was not hypoxic.  Patient was taken for stat CT head and we were asked to see the patient in consult.    On exam patient remains " "intubated and sedated on a Versed drip.  Her pupils are round and reactive.  No corneal response.  No response to nailbed pressure in any extremity.  Negative babinski.  Patient's daughter is not present but I was able to speak with her boyfriend.  Patient has apparently been complaining of feeling unwell for several days and has been having periods of, \"blacking out,\" at one point she was found on the floor and did not know how she got there.  He is not sure of what medication she is on but knows that she does not take them regularly.  She follows with Dr. 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  She was taking Topamax for headaches.  Per documentation review patient has had several admissions with a similar presentation and hypertensive emergency in the past.    Last Known Normal Date/Time: estimated 1400    Review of Systems   Unable to perform ROS: Intubated      Past Medical History:   Diagnosis Date    Abnormal Pap smear of cervix     Allergic rhinitis     Asthma     Atypical chest pain     Brain tumor     Status post brain tumor resection in 1989.    Candidiasis of mouth     Cervical high risk HPV (human papillomavirus) test positive 09/01/2020    CKD (chronic kidney disease), stage III     Colon polyps 09/09/2020    sessile and tubular adenoma    Conversion disorder     COPD (chronic obstructive pulmonary disease)     COVID-19     Elevated liver enzymes     Former smoker 07/05/2023    H/O Helicobacter infection      Status post EGD 9/4/2012, pathology revealed H. pylori infection.    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     Neurology evaluation thought it was numbness from carpal tunnel and not Guillain-Gary    Hyperlipidemia     Hypertension     Influenza     Internal hemorrhoids     Low grade squamous " intraepith lesion on cytologic smear cervix (lgsil) 2020    Median neuropathy     Migraine     PTSD (post-traumatic stress disorder)     Seizures     2 seizures after surgery    Small fiber neuropathy     Stroke     Tinea corporis      Past Surgical History:   Procedure Laterality Date    BRAIN SURGERY      status post brain tumor resection    CARDIAC CATHETERIZATION N/A 2023    Procedure: Left Heart Cath;  Surgeon: Ryder Delarosa III, MD;  Location: [facility] CATH INVASIVE LOCATION;  Service: Cardiovascular;  Laterality: N/A;    COLONOSCOPY      polyp removal    LIVER BIOPSY      UPPER GASTROINTESTINAL ENDOSCOPY      Status post EGD 2012, pathology revealed H. pylori infection.     Family History   Adopted: Yes   Problem Relation Age of Onset    Lung disease Mother     Heart disease Mother     Stroke Mother     Throat cancer Mother     Uterine cancer Mother     Ovarian cancer Mother     Prostate cancer Father     No Known Problems Sister     No Known Problems Brother     Breast cancer Other     Diabetes Other     Colon cancer Other      Social History     Socioeconomic History    Marital status: Legally    Tobacco Use    Smoking status: Every Day     Current packs/day: 0.00     Average packs/day: 0.5 packs/day for 41.7 years (20.9 ttl pk-yrs)     Types: Cigarettes     Start date: 1981     Last attempt to quit: 2022     Years since quittin.5     Passive exposure: Current    Smokeless tobacco: Never    Tobacco comments:     quit with chantix in past   Vaping Use    Vaping status: Never Used   Substance and Sexual Activity    Alcohol use: No    Drug use: No    Sexual activity: Yes     Partners: Male     Birth control/protection: Post-menopausal     Allergies   Allergen Reactions    Codeine Hives    Influenza Vaccines Other (See Comments)     Got GB syndrome    Propofol Other (See Comments)     Propofol infusion reaction, weakness, tremors, paralysis     Prior to Admission medications     Medication Sig Start Date End Date Taking? Authorizing Provider   amLODIPine (NORVASC) 5 MG tablet Take 1 tablet by mouth Daily. 11/6/23   Coby Ely PA-C   aspirin 81 MG EC tablet Take 1 tablet by mouth Daily. 12/8/23   Karol Fitch APRN   atorvastatin (LIPITOR) 40 MG tablet Take 1 tablet by mouth Daily. 12/8/23   Karol Fitch APRN   azithromycin (Zithromax Z-Jeremy) 250 MG tablet Take 2 tablets by mouth on day 1, then 1 tablet daily on days 2-5 2/29/24   Elliott Nam MD   benzonatate (TESSALON) 200 MG capsule Take 1 capsule by mouth 3 (Three) Times a Day As Needed for Cough. 1/9/24   Shlomo Patel MD   Budeson-Glycopyrrol-Formoterol (BREZTRI) 160-9-4.8 MCG/ACT aerosol inhaler Inhale 2 puffs 2 (Two) Times a Day. 11/6/23   Coby Ely PA-C   carvedilol (COREG) 12.5 MG tablet Take 1 tablet by mouth 2 (Two) Times a Day With Meals. 10/9/23   Coby Ely PA-C   FLUoxetine (PROzac) 20 MG capsule Take 1 capsule by mouth Daily. 11/20/23   Mary Canales APRN   lactulose (CHRONULAC) 10 GM/15ML solution Take 45 mL by mouth 2 (Two) Times a Day. 8/17/23   Provider, MD Denis   levETIRAcetam (Keppra) 100 MG/ML solution Take 5 mL by mouth 2 (Two) Times a Day. 1/9/24   Shlomo Patel MD   linaclotide (Linzess) 145 MCG capsule capsule Take 1 capsule by mouth Every Morning Before Breakfast. 9/22/23   [place] Sawant APRN   methylPREDNISolone (MEDROL) 4 MG dose pack Take as directed on package instructions. 2/29/24   Elliott Nam MD   mirtazapine (REMERON) 7.5 MG tablet Take 2 tablets by mouth Every Night. 11/20/23   Mary Canales APRN   pantoprazole (PROTONIX) 40 MG EC tablet TAKE 1 TABLET BY MOUTH DAILY 11/17/23   Coby Eyl, NGHIA   rizatriptan (Maxalt) 10 MG tablet Take 1 tablet at onset of headache.  May repeat once in 2 hours.  Do not take more than 2 tabs a day or 8 tabs a month 9/28/23   Carolina Tello MD   topiramate  (TOPAMAX) 25 MG tablet TAKE ONE TABLET BY MOUTH ONCE NIGHTLY FOR 7 DAYS THEN TAKE 1 TABLET BY MOUTH TWICE A DAY FOR 7 DAYS THEN TAKE ONE TABLET BY MOUTH EVERY MORNING AND 2 TABLETS AT NIGHT FOR 7 DAYS  Patient taking differently: Take 1 tablet by mouth 2 (Two) Times a Day. TAKE ONE TABLET BY MOUTH ONCE NIGHTLY FOR 7 DAYS THEN TAKE 1 TABLET BY MOUTH TWICE A DAY FOR 7 DAYS THEN TAKE ONE TABLET BY MOUTH EVERY MORNING AND 2 TABLETS AT NIGHT FOR 7 DAYS 10/16/23   Carolina Tello MD            Objective     Temp:  [97.4 °F (36.3 °C)] 97.4 °F (36.3 °C)  Heart Rate:  [] 118  Resp:  [16] 16  BP: (217-250)/(119-166) 219/142  FiO2 (%):  [100 %] 100 %  Neurological Exam  Mental Status  Unresponsive to painful stimuli. Patient is nonverbal. Language: intubated.    Cranial Nerves  CN II: No response to visual threat.  CN III, IV, VI: Pupils equal round and reactive to light bilaterally.  CN V:  Right: Absent corneal reflex.  Left: Absent corneal reflex.  CN VII: ETT.  CN IX, X:  Right: Gag is reduced.  CN XI: Shoulder shrug strength is normal.    Motor  Normal muscle bulk throughout. Normal muscle tone. Strength is 5/5 in all four extremities except as noted.  0/5 x 4.    Sensory  Pinprick abnormality:     Reflexes  Right Plantar: mute  Left Plantar: mute    Gait    Not tested.      Physical Exam  Vitals reviewed.   Constitutional:       Interventions: She is sedated and intubated.   HENT:      Head: Normocephalic and atraumatic.   Eyes:      Pupils: Pupils are equal, round, and reactive to light.   Cardiovascular:      Rate and Rhythm: Regular rhythm. Tachycardia present.   Pulmonary:      Effort: Pulmonary effort is normal. She is intubated.      Comments: intubated  Musculoskeletal:         General: No swelling.      Cervical back: Normal range of motion.   Skin:     General: Skin is warm and dry.   Neurological:      Sensory: No sensory deficit.      Motor: Weakness present.         Acute Stroke Data    IV Thrombolytic  (TPA/Tenecteplase) Inclusion / Exclusion Criteria    Time: 16:11 EDT  Person Administering Scale: LEANN Up    Inclusion Criteria  [x]   18 years of age or greater   []   Onset of symptoms < 4.5 hours before beginning treatment (stroke onset = time patient was last seen well or without symptoms).   []   Diagnosis of acute ischemic stroke causing measurable disabling deficit (Complete Hemianopia, Any Aphasia, Visual or Sensory Extinction, Any weakness limiting sustained effort against gravity)   []   Any remaining deficit considered potentially disabling in view of patient and practitioner   Exclusion criteria (Do not proceed with Alteplase if any are checked under exclusion criteria)  []   Onset unknown or GREATER than 4.5 hours   []   ICH on CT/MRI   []   CT demonstrates hypodensity representing acute or subacute infarct   []   Significant head trauma or prior stroke in the previous 3 months   []   Symptoms suggestive of subarachnoid hemorrhage   []   History of un-ruptured intracranial aneurysm GREATER than 10 mm   []   Recent intracranial or intraspinal surgery within the last 3 months   []   Arterial puncture at a non-compressible site in the previous 7 days   []   Active internal bleeding   []   Acute bleeding tendency   []   Platelet count LESS than 100,000 for known hematological diseases such as leukemia, thrombocytopenia or chronic cirrhosis   []   Current use of anticoagulant with INR GREATER than 1.7 or PT GREATER than 15 seconds, aPTT GREATER than 40 seconds   []   Heparin received within 48 hours, resulting in abnormally elevated aPTT GREATER than upper limit of normal   []   Current use of direct thrombin inhibitors or direct factor Xa inhibitors in the past 48 hours   []   Elevated blood pressure refractory to treatment (systolic GREATER than 185 mm/Hg or diastolic  GREATER than 110 mm/Hg   []   Suspected infective endocarditis and aortic arch dissection   []   Current use of therapeutic  treatment dose of low-molecular-weight heparin (LMWH) within the previous 24 hours   []   Structural GI malignancy or bleed   Relative exclusion for all patients  []   Only minor nondisabling symptoms   []   Pregnancy   [x]   Seizure at onset with postictal residual neurological impairments   []   Major surgery or previous trauma within past 14 days   []   History of previous spontaneous ICH, intracranial neoplasm, or AV malformation   []   Postpartum (within previous 14 days)   []   Recent GI or urinary tract hemorrhage (within previous 21 days)   []   Recent acute MI (within previous 3 months)   []   History of unruptured intracranial aneurysm LESS than 10 mm   []   History of ruptured intracranial aneurysm   []   Blood glucose LESS than 50 mg/dL (2.7 mmol/L)   []   Dural puncture within the last 7 days   []   Known GREATER than 10 cerebral microbleeds   Additional exclusions for patients with symptoms onset between 3 and 4.5 hours.  []   Age > 80.   []   On any anticoagulants regardless of INR  >>> Warfarin (Coumadin), Heparin, Enoxaparin (Lovenox), fondaparinux (Arixtra), bivalirudin (Angiomax), Argatroban, dabigatran (Pradaxa), rivaroxaban (Xarelto), or apixaban (Eliquis)   []   Severe stroke (NIHSS > 25).   []   History of BOTH diabetes and previous ischemic stroke.   []   The risks and benefits have been discussed with the patient or family related to the administration of IV alteplase for stroke symptoms.   []   I have discussed and reviewed the patient's case and imaging with the attending prior to IV Thrombolytic (TPA/Tenecteplase).    Time Thrombolytic administered   Non focal exam    Hospital Meds:  Scheduled- senna-docusate sodium, 2 tablet, Oral, BID  sodium chloride, 10 mL, Intravenous, Q12H      Infusions- midazolam, 1-10 mg/hr, Last Rate: 1 mg/hr (04/02/24 1513)       PRNs-   senna-docusate sodium **AND** polyethylene glycol **AND** bisacodyl **AND** bisacodyl    Calcium Replacement - Follow Nurse /  BPA Driven Protocol    Magnesium Standard Dose Replacement - Follow Nurse / BPA Driven Protocol    nitroglycerin    Phosphorus Replacement - Follow Nurse / BPA Driven Protocol    Potassium Replacement - Follow Nurse / BPA Driven Protocol    sodium chloride    sodium chloride    sodium chloride    Functional Status Prior to Current Stroke/[place] Score: MRS 0    NIH Stroke Scale  Time: 16:11 EDT  Person Administering Scale: LEANN Up    1a  Level of consciousness: 3=responds only with reflex motor or automatic effects or totally unresponsive, flaccid, areflexic   1b. LOC questions:  1=intubated   1c. LOC commands: 2=Performs neither task correctly   2.  Best Gaze: 2=forced deviation, or total gaze paresis not overcome by oculocephalic maneuver   3.  Visual: 3=Bilateral hemianopia (blind including cortical blindness)   4. Facial Palsy: 0=Normal symmetric movement   5a.  Motor left arm: 4=No movement   5b.  Motor right arm: 4=No movement   6a. motor left le=No movement   6b  Motor right le=No movement   7. Limb Ataxia: 0=Absent   8.  Sensory: 0=Normal; no sensory loss   9. Best Language:  3=Mute, global aphasia; no usable speech or auditory comprehension   10. Dysarthria: UN= intubated   11. Extinction and Inattention: 0=No abnormality    Total:   30       Results Reviewed:  I have personally reviewed current lab, radiology, and data and agree with results.    WBC   Date Value Ref Range Status   2024 6.33 3.40 - 10.80 10*3/mm3 Final     RBC   Date Value Ref Range Status   2024 4.94 3.77 - 5.28 10*6/mm3 Final     Hemoglobin   Date Value Ref Range Status   2024 15.3 12.0 - 15.9 g/dL Final   2024 14.6 12.0 - 17.0 g/dL Final     Comment:     Serial Number: [id number]:  [id number]     Hematocrit   Date Value Ref Range Status   2024 44.5 34.0 - 46.6 % Final   2024 43 38 - 51 % Final     MCV   Date Value Ref Range Status   2024 90.1 79.0 - 97.0 fL Final     MCH    Date Value Ref Range Status   04/02/2024 31.0 26.6 - 33.0 pg Final     MCHC   Date Value Ref Range Status   04/02/2024 34.4 31.5 - 35.7 g/dL Final     RDW   Date Value Ref Range Status   04/02/2024 13.4 12.3 - 15.4 % Final     RDW-SD   Date Value Ref Range Status   04/02/2024 44.5 37.0 - 54.0 fl Final     MPV   Date Value Ref Range Status   04/02/2024 11.4 6.0 - 12.0 fL Final     Platelets   Date Value Ref Range Status   04/02/2024 186 140 - 450 10*3/mm3 Final     Neutrophil %   Date Value Ref Range Status   04/02/2024 44.2 42.7 - 76.0 % Final     Lymphocyte %   Date Value Ref Range Status   04/02/2024 42.2 19.6 - 45.3 % Final     Monocyte %   Date Value Ref Range Status   04/02/2024 9.3 5.0 - 12.0 % Final     Eosinophil %   Date Value Ref Range Status   04/02/2024 3.3 0.3 - 6.2 % Final     Basophil %   Date Value Ref Range Status   04/02/2024 0.8 0.0 - 1.5 % Final     Immature Grans %   Date Value Ref Range Status   04/02/2024 0.2 0.0 - 0.5 % Final     Neutrophils, Absolute   Date Value Ref Range Status   04/02/2024 2.80 1.70 - 7.00 10*3/mm3 Final     Lymphocytes, Absolute   Date Value Ref Range Status   04/02/2024 2.67 0.70 - 3.10 10*3/mm3 Final     Monocytes, Absolute   Date Value Ref Range Status   04/02/2024 0.59 0.10 - 0.90 10*3/mm3 Final     Eosinophils, Absolute   Date Value Ref Range Status   04/02/2024 0.21 0.00 - 0.40 10*3/mm3 Final     Basophils, Absolute   Date Value Ref Range Status   04/02/2024 0.05 0.00 - 0.20 10*3/mm3 Final     Immature Grans, Absolute   Date Value Ref Range Status   04/02/2024 0.01 0.00 - 0.05 10*3/mm3 Final     nRBC   Date Value Ref Range Status   04/02/2024 0.0 0.0 - 0.2 /100 WBC Final     Lab Results   Component Value Date    GLUCOSE 104 (H) 04/02/2024    BUN 15 04/02/2024    CREATININE 0.82 04/02/2024    EGFR 86.2 04/02/2024    BCR 18.3 04/02/2024    K 3.8 04/02/2024    CO2 23.0 04/02/2024    CALCIUM 9.3 04/02/2024    ALBUMIN 4.2 04/02/2024    BILITOT 0.3 04/02/2024    AST 13  04/02/2024    ALT 11 04/02/2024     CK 52   lactate 1.2  Ethanol <10  UDS negative      CT Angiogram Head w AI Analysis of LVO    Result Date: 4/2/2024  1.No hemodynamically significant stenosis, large vessel cut or aneurysms in intracranial circulation 2.No hemodynamically significant stenosis, dissection or aneurysms in extracranial circulation. Electronically Signed: Shlomo Bal MD  4/2/2024 4:07 PM EDT  Workstation ID: [id number]    CT Angiogram Neck    Result Date: 4/2/2024  1.No hemodynamically significant stenosis, large vessel cut or aneurysms in intracranial circulation 2.No hemodynamically significant stenosis, dissection or aneurysms in extracranial circulation. Electronically Signed: Shlomo Bal MD  4/2/2024 4:07 PM EDT  Workstation ID: [id number]    CT CEREBRAL PERFUSION WITH & WITHOUT CONTRAST    Result Date: 4/2/2024   1. No evidence of core infarct nor ischemic brain at risk. Electronically Signed: Shlomo Bal MD  4/2/2024 3:58 PM EDT  Workstation ID: [id number]    CT Head Without Contrast Stroke Protocol    Result Date: 4/2/2024  Impression: No acute intracranial findings. Electronically Signed: Mario Oropeza MD  4/2/2024 3:33 PM EDT  Workstation ID: [id number]       Results for orders placed during the hospital encounter of 11/21/23    Adult Transthoracic Echo Complete W/ Cont if Necessary Per Protocol    Interpretation Summary    Left ventricular systolic function is normal. Calculated left ventricular EF = 61% Left ventricular ejection fraction appears to be 61 - 65%.    Left ventricular diastolic function was normal.       Assessment/Plan:  53-year-old, , right-handed female with known diagnosis of HTN, HLD, migraines, conversion disorder, history of seizure disorder including nonepileptic seizures, history of brain tumor s/p resection in 1989 (data deficient, no evidence of this on CT head), COPD, stage III CKD, elevated liver enzymes, and current everyday smoker who presented with  "unresponsiveness.  Last known well around 1400, patient was found unresponsive by family soon after.  Upon arrival to [facility] patient reportedly had a GCS of 6 and was completely unresponsive to painful stimuli, unresponsive to sternal rub.  Initial SBP  > 250.  Per ED provider she periodically opened her eyes and looked around the room, she was able to move all extremities but not to command, she did speak saying \"I do not feel well,\" before becoming unresponsive once again.  This happened at least 3 times.  Patient was intubated for airway protection, she was not hypoxic.  NIHS 30; (patient was evaluated soon after being intubated and sedated).  Not a candidate for IV TNK given nonfocal exam and concern for possible seizure.    CT head is negative for acute abnormality  CT perfusion is negative  CTA head and neck is negative for hemodynamically significant stenosis, no LVO or aneurysm    PTA antiplatelet: Aspirin 81 mg daily  PTA anticoagulant: None      Unresponsiveness  -Suspect hypertensive emergency rather than cerebrovascular event or seizure  -Recommend stat MRI to evaluate for any possibility of stroke  -Cardene for SBP > 220; if MRI is negative for stroke would recommend goal systolic 200  -Will hold off on adding stroke order set pending MRI results  -Received Keppra 1500 mg in the ED  -Follows with Dr. Tello; has a history of PNES. Home Keppra was DC'd per Dr. Tello.  Patient is on topamax for migraines  -Family reports noncompliance with medications  -Consider EEG in a.m. if not improved  -If MRI is negative will consult general neurology    Plan of care was discussed with family and Dr. Garcia.    LEANN Up  April 2, 2024  16:11 EDT                Electronically signed by Amara Valle APRN at 04/02/24 [id number]       "

## 2024-04-03 NOTE — PROGRESS NOTES
INTENSIVIST   PROGRESS NOTE     Hospital:  LOS: 1 day     Ms. Arcelia Quintero, 53 y.o. female is followed for a Chief Complaint of: Altered Mental Status      Subjective   S     Interval History:  Awakens and follows some commands.        The patient's relevant past medical, surgical and social history were reviewed and updated in Epic as appropriate.      ROS:   Constitutional: Negative for fever.   Respiratory: Negative for dyspnea.   Cardiovascular: Negative for chest pain.   Gastrointestinal: Negative for  nausea, vomiting and diarrhea.     Objective   O     Vitals:  Temp  Min: 98.9 °F (37.2 °C)  Max: 100.9 °F (38.3 °C)  BP  Min: 75/63  Max: 219/142  Pulse  Min: 61  Max: 117  Resp  Min: 14  Max: 17  SpO2  Min: 90 %  Max: 100 % No data recorded    Intake/Ouptut 24 hrs (7:00AM - 6:59 AM)  Intake & Output (last 3 days)         03/31 0701  04/01 0700 04/01 0701 04/02 0700 04/02 0701 04/03 0700 04/03 0701  04/04 0700    I.V. (mL/kg)   209 (2.9) 296.4 (4.1)    NG/GT   60     IV Piggyback    500    Total Intake(mL/kg)   269 (3.7) 796.4 (10.9)    Urine (mL/kg/hr)   400 190 (0.3)    Emesis/NG output   200 210    Total Output   600 400    Net   -331 +396.4                    Medications (drips):  fentanyl 10 mcg/mL, Last Rate: 50 mcg/hr (04/03/24 1414)  lactated ringers, Last Rate: 75 mL/hr (04/03/24 1502)  midazolam, Last Rate: Stopped (04/03/24 1223)  niCARdipine  Pharmacy to dose vancomycin  phenylephrine, Last Rate: 0.8 mcg/kg/min (04/03/24 1416)        Mechanical Ventilator Settings:          Resp Rate (Set): 14  Pressure Support (cm H2O): 10 cm H20  FiO2 (%): 45 %  PEEP/CPAP (cm H2O): 5 cm H20    Minute Ventilation (L/min) (Obs): 10.9 L/min  Resp Rate (Observed) Vent: 14  I:E Ratio (Set): 1:3.77  I:E Ratio (Obs): 1:2.2    PIP Observed (cm H2O): 24 cm H2O  Plateau Pressure (cm H2O): 21 cm H2O    Physical Examination  Telemetry:  Normal sinus rhythm.    Constitutional:  No acute distress.  ETT in place on mechanical  Karla is due for a refill on gabapentin 600mg but I do not see it on her med list.  Mariaa Christiansen [facility]  [phone]   ventilation.    Eyes: No scleral icterus.   PERRL, EOM intact.    Neck:  Supple, FROM   Cardiovascular: Normal rate, regular and rhythm. Normal heart sounds.  No murmurs, gallop or rub.   Respiratory: No respiratory distress. Normal respiratory effort.  Normal breath sounds  Clear to auscultation   Abdominal:  Soft. No masses. Nontender. No distension. No HSM.   Extremities: No digital cyanosis. No clubbing.  No peripheral edema.   Skin: No rashes, lesions or ulcers   Neurological:   Opens eyes and follows some commands.              Results from last 7 days   Lab Units 04/03/24  0432 04/02/24  1510 04/02/24  1501   WBC 10*3/mm3 12.15* 6.33  --    HEMOGLOBIN g/dL 15.0 15.3  --    HEMOGLOBIN, POC g/dL  --   --  14.6   MCV fL 91.1 90.1  --    PLATELETS 10*3/mm3 178 186  --      Results from last 7 days   Lab Units 04/03/24  0432 04/02/24  1507 04/02/24  1501   SODIUM mmol/L 139 136  --    POTASSIUM mmol/L 4.4 3.8  --    CO2 mmol/L 21.0* 23.0  --    CREATININE mg/dL 1.25* 0.82 1.00   GLUCOSE mg/dL 120* 104*  --    MAGNESIUM mg/dL 2.0 2.1  --    PHOSPHORUS mg/dL 5.5*  --   --      Estimated Creatinine Clearance: 60.7 mL/min (A) (by C-G formula based on SCr of 1.25 mg/dL (H)).  Results from last 7 days   Lab Units 04/02/24  1507   ALK PHOS U/L 106   BILIRUBIN mg/dL 0.3   ALT (SGPT) U/L 11   AST (SGOT) U/L 13       Results from last 7 days   Lab Units 04/03/24  1208 04/02/24  1635   PH, ARTERIAL pH units 7.303* 7.258*   PCO2, ARTERIAL mm Hg 47.3* 49.3*   PO2 ART mm Hg 75.7* 228.0*   FIO2 % 45 100       Images:  Imaging Results (Last 24 Hours)       Procedure Component Value Units Date/Time    MRI Brain Without Contrast [[id number]] Collected: 04/03/24 0304     Updated: 04/03/24 0308    Narrative:      MRI BRAIN WO CONTRAST    Date of Exam: 4/3/2024 2:30 AM EDT    Indication: Stroke, follow up  unresponsiveness.     Comparison: 4/2/2024.    Technique:  Routine multiplanar/multisequence sequence images of the brain were  obtained without contrast administration.      Findings:  There is no diffusion restriction to suggest acute infarct. There is no evidence of acute or chronic intracranial hemorrhage. No mass effect or midline shift. No abnormal extra-axial collections. The major vascular flow voids appear intact. The mild   periventricular and subcortical FLAIR signal changes are present. Basal ganglia, brainstem and cerebellum appear within normal limits. Calvarial and superficial soft tissue signal is within normal limits. Orbits appear unremarkable. The paranasal sinuses   and the mastoid air cells appear well aerated. Midline structures are intact.         Impression:      Impression:    1. No evidence of hemorrhage, mass effect or midline shift. No evidence of recent or acute ischemia.  2. Mild periventricular and subcortical FLAIR signal changes likely related to chronic microvascular ischemic change.        Electronically Signed: Shanthi Dai MD    4/3/2024 3:04 AM EDT    Workstation ID: [id number]    XR Chest 1 View [[id number]] Collected: 04/02/24 1614     Updated: 04/02/24 1619    Narrative:      XR CHEST 1 VW    Date of Exam: 4/2/2024 4:08 PM EDT    Indication: Weak/Dizzy/AMS triage protocol    Comparison: 1/9/2024    Findings:  ET tube is seen in good position midway between the clavicles and yolie. NG tube is seen passing below the left hemidiaphragm. The heart mediastinum and pulmonary vasculature appear within normal limits. Lungs appear well expanded and clear bilaterally.      Impression:      Impression:    1. ET tube and NG tube appear to be in satisfactory position.    2. No evidence of active chest disease is seen.      Electronically Signed: Jonathan Gonzalez MD    4/2/2024 4:16 PM EDT    Workstation ID: [id number]    XR Abdomen KUB [[id number]] Collected: 04/02/24 1610     Updated: 04/02/24 1614    Narrative:      XR ABDOMEN KUB    Date of Exam: 4/2/2024 3:59 PM EDT    Indication: NG    Comparison: None  available.    Findings:  Nasogastric tube terminates proximally in the stomach with the proximal side port near the GE junction.      Impression:      Impression:  Nasogastric tube terminates proximally in the stomach with the proximal side port near the GE junction.        Electronically Signed: Shaquille Avalos MD    4/2/2024 4:11 PM EDT    Workstation ID: [id number]    CT Angiogram Head w AI Analysis of LVO [[id number]] Collected: 04/02/24 1558     Updated: 04/02/24 1612    Narrative:      CT ANGIOGRAM NECK, CT ANGIOGRAM HEAD W AI ANALYSIS OF LVO    Date of Exam: 4/2/2024 3:31 PM EDT    Indication: stroke workup/unresponsive.    Comparison: 1/9/2024    Technique: CTA of the neck was performed before and after the uneventful intravenous administration of 115 cc Isovue-370. Reconstructed coronal and sagittal images were also obtained. In addition, a 3-D volume rendered image was created for   interpretation. Automated exposure control and iterative reconstruction methods were used.    Findings:    CTA NECK:  *Aortic arch: No aneurysm. No significant stenosis or occlusion of the major arch vessels.  *Left carotid system: Minimal proximal atherosclerotic disease. No aneurysm, significant stenosis or occlusion.  *Right carotid system: Minimal proximal atherosclerotic disease. No aneurysm, significant stenosis or occlusion.  *Vertebrobasilar system: The vertebral arteries arise from their respective subclavian arteries. No aneurysm, significant stenosis or occlusion.    *There is an endotracheal tube, tip between the thoracic inlet and yolie. There is a nasogastric tube present.    CTA HEAD:  *Anterior circulation: No aneurysm, significant stenosis or occlusion.  *Posterior circulation: No aneurysm, significant stenosis or occlusion.  *Anatomic variants: None of significance.  *Venous sinuses: Patent.      Impression:      1.No hemodynamically significant stenosis, large vessel cut or aneurysms in intracranial  circulation  2.No hemodynamically significant stenosis, dissection or aneurysms in extracranial circulation.           Electronically Signed: Shlomo Bal MD    4/2/2024 4:07 PM EDT    Workstation ID: [id number]    CT Angiogram Neck [[id number]] Collected: 04/02/24 1558     Updated: 04/02/24 1612    Narrative:      CT ANGIOGRAM NECK, CT ANGIOGRAM HEAD W AI ANALYSIS OF LVO    Date of Exam: 4/2/2024 3:31 PM EDT    Indication: stroke workup/unresponsive.    Comparison: 1/9/2024    Technique: CTA of the neck was performed before and after the uneventful intravenous administration of 115 cc Isovue-370. Reconstructed coronal and sagittal images were also obtained. In addition, a 3-D volume rendered image was created for   interpretation. Automated exposure control and iterative reconstruction methods were used.    Findings:    CTA NECK:  *Aortic arch: No aneurysm. No significant stenosis or occlusion of the major arch vessels.  *Left carotid system: Minimal proximal atherosclerotic disease. No aneurysm, significant stenosis or occlusion.  *Right carotid system: Minimal proximal atherosclerotic disease. No aneurysm, significant stenosis or occlusion.  *Vertebrobasilar system: The vertebral arteries arise from their respective subclavian arteries. No aneurysm, significant stenosis or occlusion.    *There is an endotracheal tube, tip between the thoracic inlet and yolie. There is a nasogastric tube present.    CTA HEAD:  *Anterior circulation: No aneurysm, significant stenosis or occlusion.  *Posterior circulation: No aneurysm, significant stenosis or occlusion.  *Anatomic variants: None of significance.  *Venous sinuses: Patent.      Impression:      1.No hemodynamically significant stenosis, large vessel cut or aneurysms in intracranial circulation  2.No hemodynamically significant stenosis, dissection or aneurysms in extracranial circulation.           Electronically Signed: Shlomo Bal MD    4/2/2024 4:07 PM EDT     Workstation ID: [id number]    CT CEREBRAL PERFUSION WITH & WITHOUT CONTRAST [[id number]] Collected: 04/02/24 1553     Updated: 04/02/24 1612    Narrative:      CT CEREBRAL PERFUSION W WO CONTRAST    Date of Exam: 4/2/2024 3:31 PM EDT    Indication: Neuro deficit, acute, stroke suspected  stroke workup/unresponsive.     Comparison: 1/9/2024    Technique: Axial CT images of the brain were obtained prior to and after the administration of 115 cc Isovue-370. Core blood volume, core blood flow, mean transit time, and Tmax images were obtained utilizing the Rapid software protocol. A limited CT   angiogram of the head was also performed to measure the blood vessel density.    The radiation dose reduction device was turned on for each scan per the ALARA (As Low as Reasonably Achievable) protocol.      Findings:             Cerebral blood flow, blood volume, and mean transit time maps are symmetric without large perfusion defect.     CBF<30% volume: 0mL  Tmax>6sec volume: 0 mL  Mismatch volume: 0mL  Mismatch ratio: None                Impression:         1. No evidence of core infarct nor ischemic brain at risk.        Electronically Signed: Shlomo Bal MD    4/2/2024 3:58 PM EDT    Workstation ID: [id number]    CT Head Without Contrast Stroke Protocol [[id number]] Collected: 04/02/24 1531     Updated: 04/02/24 1536    Narrative:      CT HEAD WO CONTRAST STROKE PROTOCOL    Date of Exam: 4/2/2024 3:19 PM EDT    Indication: ams.    Comparison: Head CT 1/9/2024    Technique: Axial CT images were obtained of the head without contrast administration.  Reconstructed coronal images were also obtained. Automated exposure control and iterative construction methods were used.    Scan Time: 3:26 p.m. 4/2/2024  Results discussed with Dr. Garcia by Dr. Mario Oropeza in person at the CT scanner at 3:28 p.m 4/2/2024.      Findings:  No acute intracranial hemorrhage. No acute large territory infarct. No extra-axial collections. No mass effect.  Normal size and configuration of the ventricles. Unremarkable appearance of the orbits. Paranasal sinuses and mastoid air cells are clear.   Partial visualization of nasogastric tube; endotracheal tube is noted on the  image.      Impression:      Impression:  No acute intracranial findings.        Electronically Signed: Mario Oropeza MD    4/2/2024 3:33 PM EDT    Workstation ID: [id number]               Results: Reviewed.  I reviewed the patient's new laboratory and imaging results.  I independently reviewed the patient's new images.    Medications: Reviewed.    Assessment & Plan   A / P     Ms. Quintero is a 52yo F who presented to the [facility] ED on 4/2/24 with suspected CVA. Upon arrival, she was noted to have a SBP > 220. She was intubated in the ED for airway protection. She was admitted to the ICU on mechanical ventilation.     CVA workup including MRI brain was negative. Neurology is following and suspects malignant hypertension.     Nutrition:   NPO Diet NPO Type: Strict NPO  Advance Directives:   There are no questions and answers to display.       Active Hospital Problems    Diagnosis     **Encephalopathy acute     Acute respiratory failure with hypercapnia     CKD (chronic kidney disease), stage III     Hypertensive emergency     H/O PNES     Emphysema/COPD     Anxiety and depression     Dyslipidemia     GERD (gastroesophageal reflux disease)        Assessment / Plan:    Continue mechanical ventilation. PST when awake and following commands with extubation as tolerated.   Titrate vasopressors to maintain a SBP > 115 with slow normalization of blood pressure.   Febrile this AM. Cultures obtain and started on broad spectrum antibiotics with Vancomycin and Cefepime.   EEG. Keppra on hold.   AM labs    Remains critically ill secondary to management of mechanical ventilation and hypotension requiring titration of vasopressors.     High level of risk due to:  severe exacerbation of chronic illness and illness  with threat to life or bodily function.    Plan of care and goals reviewed during interdisciplinary rounds.  I discussed the patient's findings and my recommendations with nursing staff    Critical Care Time: was greater than 31 minutes.(This excludes time spent performing separately reportable procedures and services). including high complexity decision making to assess, manipulate, and support vital organ system failure in this individual who has impairment of one or more vital organ systems such that there is a high probability of imminent or life threatening deterioration in the patient’s condition.      Jenae Case, DO    Intensive Care Medicine and Pulmonary Medicine

## 2024-04-03 NOTE — PROGRESS NOTES
"Pharmacy Consult-Vancomycin Dosing  Arcelia Quintero is a  53 y.o. female receiving vancomycin therapy.     Indication: Sepsis  Consulting Provider: Case  ID Consult: No    Goal AUC: 400 - 600 mg/L*hr    Current Antimicrobial Therapy  Anti-Infectives (From admission, onward)      Ordered     Dose/Rate Route Frequency Start Stop    04/03/24 1308  vancomycin IVPB 1500 mg in 0.9% NaCl (Premix) 500 mL        Ordering Provider: Pancho Smtyh RPH    1,500 mg  333.3 mL/hr over 90 Minutes Intravenous Every 24 Hours 04/04/24 1200 04/11/24 1159    04/03/24 1005  cefepime 2000 mg IVPB in 100 mL NS (MBP)        Ordering Provider: Case Jenae V., DO    2,000 mg  over 4 Hours Intravenous Every 8 Hours 04/03/24 2000 04/10/24 1959    04/03/24 1112  Pharmacy to dose vancomycin        Ordering Provider: Case Jenae V., DO     Does not apply Continuous PRN 04/03/24 1112 04/10/24 1111    04/03/24 1005  cefepime 2000 mg IVPB in 100 mL NS (MBP)        Ordering Provider: Jose, Jenae V., DO    2,000 mg  over 30 Minutes Intravenous Once 04/03/24 1100 04/03/24 1149    04/03/24 1009  vancomycin IVPB 2000 mg in 0.9% Sodium Chloride 500 mL        Ordering Provider: Jose, Jenae V., DO    2,000 mg  250 mL/hr over 120 Minutes Intravenous Once 04/03/24 1100              Allergies  Allergies as of 04/02/2024 - Unable to Assess 04/02/2024   Allergen Reaction Noted    Codeine Hives 08/22/2016    Influenza vaccines Other (See Comments) 05/08/2019    Propofol Other (See Comments) 2017       Labs    Results from last 7 days   Lab Units 04/03/24  0432 04/02/24  1507 04/02/24  1501   BUN mg/dL 20 15  --    CREATININE mg/dL 1.25* 0.82 1.00       Results from last 7 days   Lab Units 04/03/24  0432 04/02/24  1510   WBC 10*3/mm3 12.15* 6.33       Evaluation of Dosing     Last Dose Received in the ED/Outside Facility: N/A  Is Patient on Dialysis or Renal Replacement: N/A    Ht - 170.2 cm (67\")  Wt - 73 kg (160 lb 15 oz)    Estimated Creatinine Clearance: " 60.7 mL/min (A) (by C-G formula based on SCr of 1.25 mg/dL (H)).    I/O last 3 completed shifts:  In: 269 [I.V.:209; NG/GT:60]  Out: 600 [Urine:400; Emesis/NG output:200]    Microbiology and Radiology  Microbiology Results (last 10 days)       Procedure Component Value - Date/Time    Respiratory Panel PCR w/COVID-19(SARS-CoV-2) YENY/SHALOM/KIMMIE/PAD/COR/LUKAS In-House, NP Swab in UTM/VTM, 2 HR TAT - Swab, Nasopharynx [[id number]]  (Normal) Collected: 04/03/24 0840    Lab Status: Final result Specimen: Swab from Nasopharynx Updated: 04/03/24 1124     ADENOVIRUS, PCR Not Detected     Coronavirus 229E Not Detected     Coronavirus HKU1 Not Detected     Coronavirus NL63 Not Detected     Coronavirus OC43 Not Detected     COVID19 Not Detected     Human Metapneumovirus Not Detected     Human Rhinovirus/Enterovirus Not Detected     Influenza A PCR Not Detected     Influenza B PCR Not Detected     Parainfluenza Virus 1 Not Detected     Parainfluenza Virus 2 Not Detected     Parainfluenza Virus 3 Not Detected     Parainfluenza Virus 4 Not Detected     RSV, PCR Not Detected     Bordetella pertussis pcr Not Detected     Bordetella parapertussis PCR Not Detected     Chlamydophila pneumoniae PCR Not Detected     Mycoplasma pneumo by PCR Not Detected    Narrative:      In the setting of a positive respiratory panel with a viral infection PLUS a negative procalcitonin without other underlying concern for bacterial infection, consider observing off antibiotics or discontinuation of antibiotics and continue supportive care. If the respiratory panel is positive for atypical bacterial infection (Bordetella pertussis, Chlamydophila pneumoniae, or Mycoplasma pneumoniae), consider antibiotic de-escalation to target atypical bacterial infection.            Reported Vancomycin Levels                         Insight[employer] AUC Calculation:    Current AUC:   N/A mg/L*hr    Predicted Steady State AUC on Current Dose: 500-600  mg/L*hr  _________________________________    Predicted Steady State AUC on New Dose:   N/A mg/L*hr    Assessment/Plan:   Pt received a one-time loading dose of vancomycin 2000 mg IV on 4/3 at 1200  Based on the patient's current renal fxn and estimated clearance, will start patient on a maintenance dose of vancomcyin 1500 mg IV q24h to start on 4/4 at 1200.  Continue to follow alterations in renal fxn, cultures, and clinical status.    Pharmacy to follow    Pancho Smyth, PharmD, BCPS, BCCCP, FCCP

## 2024-04-03 NOTE — CASE MANAGEMENT/SOCIAL WORK
Discharge Planning Assessment  [facility]     Patient Name: Arcelia Quintero  MRN: [id number]  Today's Date: 4/3/2024    Admit Date: 4/2/2024    Plan: IDP   Discharge Needs Assessment       Row Name 04/03/24 1529       Living Environment    People in Home child(collin), adult    Unique Family Situation Lives with daughter/Monalisa Greenberg    Current Living Arrangements home    Potentially Unsafe Housing Conditions none    Primary Care Provided by self    Provides Primary Care For no one    Family Caregiver if Needed child(collin), adult    Quality of Family Relationships helpful;involved;supportive    Able to Return to Prior Arrangements yes       Transition Planning    Patient/Family Anticipates Transition to home with family       Discharge Needs Assessment    Equipment Currently Used at Home nebulizer                   Discharge Plan       Row Name 04/03/24 1536       Plan    Plan IDP    Patient/Family in Agreement with Plan yes    Plan Comments Pt is currently on the mechanical ventilator.  spoke with pt's daughter/Monalisa, on the phone. Pt lives with Monalisa in [facility] [place], in a home. Monalisa states that the pt no longer works, she is on disability. Monalisa states that she assists her mother, as needed, with ADL's. Pt ambulates independently. She no longer drives. Monalisa transports her. Pt has a nebulizer at home. No HH/OPPT. PCP-Bran Ely. Confirmed [employer] [place], [employer] [place], and prescriptions filled at [facility] .  will continue to follow.    Final Discharge Disposition Code 30 - still a patient                  Continued Care and Services - Admitted Since 4/2/2024    No active coordination exists for this encounter.          Demographic Summary       Row Name 04/03/24 1520       General Information    Admission Type inpatient    Reason for Consult discharge planning    Preferred Language English       Contact Information    Permission Granted to Share Info With                     Functional Status       Row Name 04/03/24 1528       Functional Status    Usual Activity Tolerance good       Functional Status, IADL    Medications assistive person    Meal Preparation assistive person    Housekeeping assistive person    Laundry assistive person    Shopping assistive person                   Psychosocial    No documentation.                  Abuse/Neglect    No documentation.                  Legal    No documentation.                  Substance Abuse    No documentation.                  Patient Forms    No documentation.                     Ceci Sen RN

## 2024-04-03 NOTE — PAYOR COMM NOTE
"ID: [id number]   : 1970      Encephalopathy acute [G93.40]   Ted Dickerson MD (NPI: [id number])     Utilization Review  Phone [phone]  Fax [phone]    [place] [id number]       Arcelia Quintero (53 y.o. Female)       Date of Birth   1970    Social Security Number       Address   [address] [place] [address]    Home Phone   [phone]    MRN   [id number]       [facility]    Marital Status   Legally                             Admission Date   24    Admission Type   Emergency    Admitting Provider   Case, Jenae BROCK DO    Attending Provider   Jose, Jenae BROCK DO    Department, Room/Bed   [facility] 2B ICU, N235/1       Discharge Date       Discharge Disposition       Discharge Destination                                 Attending Provider: Jenae Garcia DO    Allergies: Codeine, Influenza Vaccines, Propofol    Isolation: None   Infection: None   Code Status: Prior    Ht: 170.2 cm (67\")   Wt: 73 kg (160 lb 15 oz)    Admission Cmt: None   Principal Problem: Encephalopathy acute [G93.40]                   Active Insurance as of 2024       Primary Coverage       Payor Plan Insurance Group Employer/Plan Group    [facility] MEDICARE REPLACEMENT WELLCARE MED ADV SNP HMO        Payor Plan Address Payor Plan Phone Number Payor Plan Fax Number Effective Dates    [address] [address]   2023 - None Entered    [facility] [phone]         Subscriber Name Subscriber Birth Date Member ID       ARCELIA QUINTERO 1970 [id number]               Secondary Coverage       Payor Plan Insurance Group Employer/Plan Group    [facility]        Payor Plan Address Payor Plan Phone Number Payor Plan Fax Number Effective Dates    [address] [address] [phone]  8/3/2023 - None Entered    [facility] [id number]         Subscriber Name Subscriber Birth Date Member ID       ARCELIA QUINTERO 1970 " "[id number]                     Emergency Contacts        (Rel.) Home Phone Work Phone Mobile Phone    CODY GIL (Daughter) -- -- [phone]    Lainey Gil (Daughter) [phone] -- --    Beverley Gil (Daughter) -- -- [phone]              Song: ANA [id number] Tax ID [id number]  Insurance Information                  [facility]/WELLCARE MED ADV SNP HMO Phone: --    Subscriber: Arcelia Quintero Subscriber#: [id number]    Group#: -- Precert#: --        WELLCARE [facility]/WELLCARE MEDICAID Phone: [phone]    Subscriber: Arcelia Quintero Subscriber#: [id number]    Group#: -- Precert#: --             History & Physical        Ted Dickerson MD at 04/02/24 1552            INTENSIVIST   INITIAL HOSPITAL VISIT NOTE     Chief Complaint: Found unresponsive by family    Subjective  S   Arcelia TREVOR Leon is a 53 y.o. female who presents to [facility] ED 04/02/24 with suspected CVA.     Patient has a PMH of tobacco use, emphysema/COPD, chronic migraines on Topamax, seizure disorder including nonepileptic seizures (follows with Dr. 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     Per report, patient has been feeling unwell for the past several days with intermittent episodes of \"blacking out.\" Her LKW was ~1400 today at which time she told her daughter she did not feel well and went to lay down for a nap. Upon going to check in on her later this afternoon found her unresponsive, prompting call to EMS. Upon their arrival she was additionally noted to be significantly hypertensive ('s) and was brought to our ED for further evaluation.     Upon ED arrival initial VS as follows: Temp 97.4 F, HR 84, /120 mmHg, RR 16, and SpO2 95% on RA, however was intubated for airway protection. Post-intubation ABG with hypercapnia. CXR with ETT in " good position and was negative for cardiopulmonary process. Labs unremarkable with UA and UDS negative. CTH was negative for an acute intracranial abnormality with subsequent CTA H/N negative for flow limiting stenosis and CTP negative for LVO.     She was evaluated by Neurology who recommended Keppra load and STAT MRI. Ms. Quintero will be admitted to ICU for further management.     Time spent: 10 minutes  Electronically signed by Gerri Gastelum, LOUIS, LEANN, 04/02/24, 4:48 PM EDT.     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   PSxH: She  has a past surgical history that includes Brain surgery; Colonoscopy; Upper gastrointestinal endoscopy; Liver biopsy; and Cardiac catheterization (N/A, 12/27/2023).      Medications:  No current facility-administered medications on file prior to encounter.     Current Outpatient Medications on File Prior to Encounter   Medication Sig    amLODIPine (NORVASC) 5 MG tablet Take 1 tablet by mouth Daily.    aspirin 81 MG EC tablet Take 1 tablet by mouth Daily.    atorvastatin (LIPITOR) 40 MG tablet Take 1 tablet by mouth Daily.    azithromycin (Zithromax Z-Jeremy) 250 MG tablet Take 2 tablets by mouth on day 1, then 1 tablet daily on days 2-5     benzonatate (TESSALON) 200 MG capsule Take 1 capsule by mouth 3 (Three) Times a Day As Needed for Cough.    Budeson-Glycopyrrol-Formoterol (BREZTRI) 160-9-4.8 MCG/ACT aerosol inhaler Inhale 2 puffs 2 (Two) Times a Day.    carvedilol (COREG) 12.5 MG tablet Take 1 tablet by mouth 2 (Two) Times a Day With Meals.    FLUoxetine (PROzac) 20 MG capsule Take 1 capsule by mouth Daily.    lactulose (CHRONULAC) 10 GM/15ML solution Take 45 mL by mouth 2 (Two) Times a Day.    levETIRAcetam (Keppra) 100 MG/ML solution Take 5 mL by mouth 2 (Two) Times a Day.    linaclotide (Linzess) 145 MCG capsule capsule Take 1 capsule by mouth Every Morning Before Breakfast.    methylPREDNISolone (MEDROL) 4 MG dose pack Take as directed on package instructions.    mirtazapine (REMERON) 7.5 MG tablet Take 2 tablets by mouth Every Night.    pantoprazole (PROTONIX) 40 MG EC tablet TAKE 1 TABLET BY MOUTH DAILY    rizatriptan (Maxalt) 10 MG tablet Take 1 tablet at onset of headache.  May repeat once in 2 hours.  Do not take more than 2 tabs a day or 8 tabs a month    topiramate (TOPAMAX) 25 MG tablet TAKE ONE TABLET BY MOUTH ONCE NIGHTLY FOR 7 DAYS THEN TAKE 1 TABLET BY MOUTH TWICE A DAY FOR 7 DAYS THEN TAKE ONE TABLET BY MOUTH EVERY MORNING AND 2 TABLETS AT NIGHT FOR 7 DAYS (Patient taking differently: Take 1 tablet by mouth 2 (Two) Times a Day. TAKE ONE TABLET BY MOUTH ONCE NIGHTLY FOR 7 DAYS THEN TAKE 1 TABLET BY MOUTH TWICE A DAY FOR 7 DAYS THEN TAKE ONE TABLET BY MOUTH EVERY MORNING AND 2 TABLETS AT NIGHT FOR 7 DAYS)     Allergies: She is allergic to codeine, influenza vaccines, and propofol.   FH: Her family history includes Breast cancer in an other family member; Colon cancer in an other family member; Diabetes in an other family member; Heart disease in her mother; Lung disease in her mother; No Known Problems in her brother and sister; Ovarian cancer in her mother; Prostate cancer in her father; Stroke in her mother; Throat cancer in  "her mother; Uterine cancer in her mother. She was adopted.   SH: She  reports that she has been smoking cigarettes. She started smoking about 43 years ago. She has a 20.9 pack-year smoking history. She has been exposed to tobacco smoke. She has never used smokeless tobacco. She reports that she does not drink alcohol and does not use drugs.     The patient's relevant past medical, surgical and social history were reviewed and updated in Epic as appropriate.     Objective  O     Medications (drips):  midazolam, Last Rate: 1 mg/hr (04/02/24 1513)  niCARdipine    Physical Examination:  Vital Signs: Blood pressure (!) 171/128, pulse 96, temperature 97.4 °F (36.3 °C), temperature source Axillary, resp. rate 16, height 170.2 cm (67\"), weight 65.8 kg (145 lb), SpO2 99%, not currently breastfeeding.    General: Appears comfortable on mechanical ventilation.  Hypertensive, tachycardic.  Afebrile.  Chest: Clear to auscultation bilaterally, No wheezing, rhonchi, or rales.  Appropriate oxygen saturations on mechanical ventilation.  Cardiac: Tachycardic.  S1S2 auscultated. No murmurs, rubs or gallops.   Abdomen: Soft, non-tender, non-distended, positive bowel sounds in all four quadrants.   Extremities: No lower extremity edema. No clubbing or cyanosis.  Neuro: Intubated, sedated.  Does not wince to noxious stimuli.    Lines, Drains & Airways       Active LDAs       Name Placement date Placement time Site Days    Peripheral IV 04/02/24 Anterior;Distal;Left;Upper Arm 04/02/24  --  Arm  less than 1    Peripheral IV Right Antecubital --  --  Antecubital  --    Peripheral IV 04/02/24 Left Antecubital 04/02/24  --  Antecubital  less than 1    NG/OG Tube Nasogastric 16 Fr Right nostril 04/02/24  1510  Right nostril  less than 1    Urethral Catheter 16 Fr. 04/02/24  1513  -- less than 1    ETT  04/02/24  1507  -- less than 1        Results from last 7 days   Lab Units 04/02/24  1510 04/02/24  1501   WBC 10*3/mm3 6.33  --    HEMOGLOBIN " g/dL 15.3  --    HEMOGLOBIN, POC g/dL  --  14.6   MCV fL 90.1  --    PLATELETS 10*3/mm3 186  --      Results from last 7 days   Lab Units 04/02/24  1507 04/02/24  1501   SODIUM mmol/L 136  --    POTASSIUM mmol/L 3.8  --    CO2 mmol/L 23.0  --    CREATININE mg/dL 0.82 1.00   MAGNESIUM mg/dL 2.1  --      Estimated Creatinine Clearance: 83.4 mL/min (by C-G formula based on SCr of 0.82 mg/dL).  Results from last 7 days   Lab Units 04/02/24  1507   ALK PHOS U/L 106   BILIRUBIN mg/dL 0.3   ALT (SGPT) U/L 11   AST (SGOT) U/L 13     Respiratory (ie nasal cannula, HFNC, BiPAP, mechanical ventilation settings) support: MV    Images:  XR Chest 1 View    Result Date: 4/2/2024  Impression: 1. ET tube and NG tube appear to be in satisfactory position. 2. No evidence of active chest disease is seen. Electronically Signed: Jonathan Gonzalez MD  4/2/2024 4:16 PM EDT  Workstation ID: [id number]    XR Abdomen KUB    Result Date: 4/2/2024  Impression: Nasogastric tube terminates proximally in the stomach with the proximal side port near the GE junction. Electronically Signed: Shaquille Avalos MD  4/2/2024 4:11 PM EDT  Workstation ID: [id number]    CT Angiogram Head w AI Analysis of LVO    Result Date: 4/2/2024  1.No hemodynamically significant stenosis, large vessel cut or aneurysms in intracranial circulation 2.No hemodynamically significant stenosis, dissection or aneurysms in extracranial circulation. Electronically Signed: Shlomo Bal MD  4/2/2024 4:07 PM EDT  Workstation ID: [id number]    CT Angiogram Neck    Result Date: 4/2/2024  1.No hemodynamically significant stenosis, large vessel cut or aneurysms in intracranial circulation 2.No hemodynamically significant stenosis, dissection or aneurysms in extracranial circulation. Electronically Signed: Shlomo Bal MD  4/2/2024 4:07 PM EDT  Workstation ID: [id number]    CT CEREBRAL PERFUSION WITH & WITHOUT CONTRAST    Result Date: 4/2/2024   1. No evidence of core infarct nor ischemic brain at risk.  Electronically Signed: Shlomo Bal MD  4/2/2024 3:58 PM EDT  Workstation ID: [id number]    CT Head Without Contrast Stroke Protocol    Result Date: 4/2/2024  Impression: No acute intracranial findings. Electronically Signed: Mario Oropeza MD  4/2/2024 3:33 PM EDT  Workstation ID: [id number]     I reviewed the patient's new laboratory and imaging results.  I independently reviewed the patient's new images.    Assessment & Plan   A / P     Active Hospital Problems    Diagnosis     **Encephalopathy acute     Acute respiratory failure with hypercapnia     Hypertensive emergency     H/O PNES     CKD (chronic kidney disease), stage III     Emphysema/COPD     Anxiety and depression     Dyslipidemia     GERD (gastroesophageal reflux disease)      #Encephalopathy  -Etiology of obtundation unclear at this time.  Differential diagnosis includes CVA/TIA, seizure, toxic, press, CNS infection.  Stroke/neurology team following given acute onset of symptoms. Preliminary imaging inconsistent with acute CVA but will be admitted to the neuro ICU for close hemodynamic monitoring, neurochecks, etc  -Lactate, TSH WNL. UDS + urine fentanyl as well as ethanol, salicylate and acetaminophen negative. Patient afebrile with normal WBC-- making infection less likely. Procalcitonin pending. UA normal   -Intubated for airway protection in the ER (4/02)  -MRI pending     #Acute hypoxic respiratory failure requiring intubation/mechanical ventilation  -Chiefly intubated for airway protection. Adjusting mechanical ventilator based on clinical exam and serial ABG     #HTN Emergency  - Cardene gtt if needed for SBP >220    #PNES  -Loaded with Keppra in the ED. Questionably on Keppra at home. Will need to confirm with patient when extubated   -Repeat EEG pending    Pre-existing conditions:   #Anxiety/depression/PTSD  #Constipation  #GERD  #HLD  #Emphysema/COPD  -Continue home medications when clinically appropriate      F-NPO  A-Fentanyl  S-Versed  T-SCDs   H-Head of bed greater than 30 degrees  U-NA  G-FSBS per unit protocol, correction dose insulin  S-NA  B-Will monitor and provide regimen if indicated  I-PIV  D-NA    Plan of care and goals reviewed during interdisciplinary rounds.  I discussed the patient's findings and my recommendations with patient, nursing staff, and consulting provider    Time: Critical Care time spent in direct patient care: 50 minutes (excluding procedure time, if applicable) including high complexity decision making to assess, manipulate, and support vital organ system failure in this individual who has impairment of one or more vital organ systems such that there is a high probability of imminent or life threatening deterioration in the patient’s condition.     -- Terrance Dickerson MD  Pulmonary/Critical Care    Electronically signed by Ted Dickerson MD at 04/02/24 1826       Emergency Department Notes    No notes of this type exist for this encounter.       Facility-Administered Medications as of 4/3/2024   Medication Dose Route Frequency Provider Last Rate Last Admin    acetaminophen (TYLENOL) 160 MG/5ML oral solution 650 mg  650 mg Nasogastric Q6H PRN Juan Daniel Ballard APRN   650 mg at 04/03/24 0449    sennosides-docusate (PERICOLACE) 8.6-50 MG per tablet 2 tablet  2 tablet Nasogastric BID Weant, Pancho, RPH        And    polyethylene glycol (MIRALAX) packet 17 g  17 g Nasogastric Daily PRN Weant, Pancho, RPH        And    bisacodyl (DULCOLAX) EC tablet 5 mg  5 mg Oral Daily PRN Weant, Pancho, RPH        And    bisacodyl (DULCOLAX) suppository 10 mg  10 mg Rectal Daily PRN Weant, Pancho, RPH        Calcium Replacement - Follow Nurse / BPA Driven Protocol   Does not apply PRN Ted Dickerson MD        [COMPLETED] cefepime 2000 mg IVPB in 100 mL NS (MBP)  2,000 mg Intravenous Once Case, Jenae V., DO   2,000 mg at 04/03/24 1119    cefepime 2000 mg IVPB in 100 mL NS (MBP)  2,000 mg Intravenous Q8H Case,  Jenae V., DO        fentaNYL (SUBLIMAZE) bolus from bag 10 mcg/mL injection 50 mcg  50 mcg Intravenous Q30 Min PRN Tde Dickerson MD   50 mcg at 04/03/24 0620    fentaNYL 2500 mcg/250 mL NS infusion   mcg/hr Intravenous Titrated Ted Dickerson MD 10 mL/hr at 04/03/24 1009 100 mcg/hr at 04/03/24 1009    [COMPLETED] iopamidol (ISOVUE-370) 76 % injection 115 mL  115 mL Intravenous Once in imaging Juan Daniel Garcia MD   115 mL at 04/02/24 1548    ipratropium-albuterol (DUO-NEB) nebulizer solution 3 mL  3 mL Nebulization 4x Daily - RT Case, Jenae V., DO   3 mL at 04/03/24 1201    [COMPLETED] levETIRAcetam (KEPPRA) injection 1,500 mg  1,500 mg Intravenous Once Juan Daniel Garcia MD   1,500 mg at 04/02/24 1554    Magnesium Standard Dose Replacement - Follow Nurse / BPA Driven Protocol   Does not apply PRN Ted Dickerson MD        midazolam (VERSED) 100 mg in 100mL NS infusion  1-10 mg/hr Intravenous Titrated Juan Daniel Garcia MD 1 mL/hr at 04/03/24 0629 1 mg/hr at 04/03/24 0629    niCARdipine (CARDENE) 20 mg in 200 mL NS infusion  5-15 mg/hr Intravenous Titrated Amara Valle APRN        nitroglycerin (NITROSTAT) SL tablet 0.4 mg  0.4 mg Sublingual Q5 Min PRN Ted Dickerson MD        ondansetron (ZOFRAN) injection 4 mg  4 mg Intravenous Q6H PRN Juan Daniel Ballard APRN   4 mg at 04/02/24 2056    Pharmacy to dose vancomycin   Does not apply Continuous PRN Case, Jenae V., DO        phenylephrine (WAQAR-SYNEPHRINE) 50 mg in sodium chloride 0.9 % 250 mL infusion  0.5-3 mcg/kg/min Intravenous Titrated Juan Daniel Ballard APRN 21.9 mL/hr at 04/03/24 1009 1 mcg/kg/min at 04/03/24 1009    Phosphorus Replacement - Follow Nurse / BPA Driven Protocol   Does not apply PRN Tuan, Ted, MD        Potassium Replacement - Follow Nurse / BPA Driven Protocol   Does not apply Ted Stovall MD        [COMPLETED] Propofol (DIPRIVAN) injection   Intravenous Code / Trauma / Sedation Medication Juan Daniel Garcia MD    100 mg at 04/02/24 1505    [COMPLETED] rocuronium (ZEMURON) injection    Code / Trauma / Sedation Medication Juan Daniel Garcia MD   100 mg at 04/02/24 1505    sodium chloride 0.9 % flush 10 mL  10 mL Intravenous PRN Juan Daniel Gacria MD        sodium chloride 0.9 % flush 10 mL  10 mL Intravenous Q12H Ted Dickerson MD   10 mL at 04/03/24 0832    sodium chloride 0.9 % flush 10 mL  10 mL Intravenous PRN Ted Dickerson MD        sodium chloride 0.9 % infusion 40 mL  40 mL Intravenous PRN Ted Dickerson MD        vancomycin IVPB 2000 mg in 0.9% Sodium Chloride 500 mL  2,000 mg Intravenous Once Case, Jenae V.,  mL/hr at 04/03/24 1156 2,000 mg at 04/03/24 1156     Lab Results (all)       Procedure Component Value Units Date/Time    Blood Gas, Arterial With Co-Ox [[id number]]  (Abnormal) Collected: 04/03/24 1208    Specimen: Arterial Blood Updated: 04/03/24 1209     Site Right Radial     Gian's Test N/A     pH, Arterial 7.303 pH units      Comment: 84 Value below reference range        pCO2, Arterial 47.3 mm Hg      Comment: 83 Value above reference range        pO2, Arterial 75.7 mm Hg      Comment: 84 Value below reference range        HCO3, Arterial 23.4 mmol/L      Base Excess, Arterial -3.3 mmol/L      Hemoglobin, Blood Gas 14.5 g/dL      Hematocrit, Blood Gas 44.4 %      Oxyhemoglobin 93.7 %      Comment: 84 Value below reference range        Methemoglobin 0.30 %      Carboxyhemoglobin 1.3 %      CO2 Content 24.9 mmol/L      Temperature 37.0     Barometric Pressure for Blood Gas --     Comment: N/A        Modality Ventilator     FIO2 45 %      Rate 0 Breaths/minute      PIP 0 cmH2O      Comment: Meter: [id number]     :  [id number]        IPAP 0     EPAP 0     pH, Temp Corrected 7.303 pH Units      pCO2, Temperature Corrected 47.3 mm Hg      pO2, Temperature Corrected 75.7 mm Hg     POC Glucose Once [[id number]]  (Normal) Collected: 04/03/24 1157    Specimen: Blood Updated: 04/03/24 1208      Glucose 121 mg/dL     Respiratory Panel PCR w/COVID-19(SARS-CoV-2) YENY/SHALOM/KIMMIE/PAD/COR/LUKAS In-House, NP Swab in UTM/VTM, 2 HR TAT - Swab, Nasopharynx [[id number]]  (Normal) Collected: 04/03/24 0840    Specimen: Swab from Nasopharynx Updated: 04/03/24 1124     ADENOVIRUS, PCR Not Detected     Coronavirus 229E Not Detected     Coronavirus HKU1 Not Detected     Coronavirus NL63 Not Detected     Coronavirus OC43 Not Detected     COVID19 Not Detected     Human Metapneumovirus Not Detected     Human Rhinovirus/Enterovirus Not Detected     Influenza A PCR Not Detected     Influenza B PCR Not Detected     Parainfluenza Virus 1 Not Detected     Parainfluenza Virus 2 Not Detected     Parainfluenza Virus 3 Not Detected     Parainfluenza Virus 4 Not Detected     RSV, PCR Not Detected     Bordetella pertussis pcr Not Detected     Bordetella parapertussis PCR Not Detected     Chlamydophila pneumoniae PCR Not Detected     Mycoplasma pneumo by PCR Not Detected    Narrative:      In the setting of a positive respiratory panel with a viral infection PLUS a negative procalcitonin without other underlying concern for bacterial infection, consider observing off antibiotics or discontinuation of antibiotics and continue supportive care. If the respiratory panel is positive for atypical bacterial infection (Bordetella pertussis, Chlamydophila pneumoniae, or Mycoplasma pneumoniae), consider antibiotic de-escalation to target atypical bacterial infection.    Urinalysis With Culture If Indicated - Urine, Catheter [[id number]]  (Abnormal) Collected: 04/03/24 1025    Specimen: Urine, Catheter Updated: 04/03/24 1055     Color, UA Dark Yellow     Appearance, UA Clear     pH, UA 5.5     Specific Gravity, UA 1.059     Glucose, UA Negative     Ketones, UA Negative     Bilirubin, UA Negative     Blood, UA Large (3+)     Protein, UA 30 mg/dL (1+)     Leuk Esterase, UA Negative     Nitrite, UA Negative     Urobilinogen, UA 0.2 E.U./dL    Narrative:       In absence of clinical symptoms, the presence of pyuria, bacteria, and/or nitrites on the urinalysis result does not correlate with infection.    Urinalysis, Microscopic Only - Urine, Catheter [[id number]]  (Abnormal) Collected: 04/03/24 1025    Specimen: Urine, Catheter Updated: 04/03/24 1055     RBC, UA 11-20 /HPF      WBC, UA 3-5 /HPF      Comment: Urine culture not indicated.        Bacteria, UA 1+ /HPF      Squamous Epithelial Cells, UA 0-2 /HPF      Yeast, UA Small/1+ Yeast /HPF      Hyaline Casts, UA 7-12 /LPF      Calcium Oxalate Crystals, UA Small/1+ /HPF      Mucus, UA Small/1+ /HPF      Methodology Manual Light Microscopy    Respiratory Culture - Aspirate, ET Suction [[id number]] Collected: 04/03/24 1031    Specimen: Aspirate from ET Suction Updated: 04/03/24 1031    Blood Culture - Blood, Hand, Left [[id number]] Collected: 04/03/24 0930    Specimen: Blood from Hand, Left Updated: 04/03/24 1007    Blood Culture - Blood, Arm, Left [[id number]] Collected: 04/03/24 0940    Specimen: Blood from Arm, Left Updated: 04/03/24 1007    POC Glucose Once [[id number]]  (Normal) Collected: 04/03/24 0615    Specimen: Blood Updated: 04/03/24 0639     Glucose 129 mg/dL     Valproic Acid Level, Total [[id number]]  (Abnormal) Collected: 04/03/24 0432    Specimen: Blood Updated: 04/03/24 0629     Valproic Acid <2.8 mcg/mL     Narrative:      Therapeutic Ranges for Valproic Acid    Epilepsy:       mcg/ml  Bipolar/Daphnie  up to 125 mcg/ml      Basic Metabolic Panel [[id number]]  (Abnormal) Collected: 04/03/24 0432    Specimen: Blood Updated: 04/03/24 0552     Glucose 120 mg/dL      BUN 20 mg/dL      Creatinine 1.25 mg/dL      Sodium 139 mmol/L      Potassium 4.4 mmol/L      Chloride 105 mmol/L      CO2 21.0 mmol/L      Calcium 8.8 mg/dL      BUN/Creatinine Ratio 16.0     Anion Gap 13.0 mmol/L      eGFR 52.0 mL/min/1.73     Narrative:      GFR Normal >60  Chronic Kidney Disease <60  Kidney Failure <15      Magnesium [[id number]]   (Normal) Collected: 04/03/24 0432    Specimen: Blood Updated: 04/03/24 0552     Magnesium 2.0 mg/dL     Phosphorus [[id number]]  (Abnormal) Collected: 04/03/24 0432    Specimen: Blood Updated: 04/03/24 0542     Phosphorus 5.5 mg/dL     CBC (No Diff) [[id number]]  (Abnormal) Collected: 04/03/24 0432    Specimen: Blood Updated: 04/03/24 0531     WBC 12.15 10*3/mm3      RBC 4.97 10*6/mm3      Hemoglobin 15.0 g/dL      Hematocrit 45.3 %      MCV 91.1 fL      MCH 30.2 pg      MCHC 33.1 g/dL      RDW 13.4 %      RDW-SD 45.3 fl      MPV 11.4 fL      Platelets 178 10*3/mm3     POC Glucose Once [[id number]]  (Abnormal) Collected: 04/03/24 0011    Specimen: Blood Updated: 04/03/24 0019     Glucose 167 mg/dL     [place] [facility] [[id number]] Collected: 04/02/24 1507    Specimen: Blood Updated: 04/02/24 1915    Narrative:      The following orders were created for panel order [place] [facility].  Procedure                               Abnormality         Status                     ---------                               -----------         ------                     Green Top (Gel)[[id number]]                                  Final result               Lavender Top[[id number]]                                     Final result               Gold Top - SST[[id number]]                                   Final result               Person Top[[id number]]                                         Final result               Light Blue Top[[id number]]                                   Final result                 Please view results for these tests on the individual orders.    Gray Top [[id number]] Collected: 04/02/24 1510    Specimen: Blood Updated: 04/02/24 1915     Extra Tube Hold for add-ons.     Comment: Auto resulted.       Procalcitonin [[id number]]  (Normal) Collected: 04/02/24 1510    Specimen: Blood Updated: 04/02/24 1805     Procalcitonin 0.04 ng/mL     Narrative:      As a Marker for Sepsis (Non-Neonates):    1. <0.5 ng/mL represents a low risk of  "severe sepsis and/or septic shock.  2. >2 ng/mL represents a high risk of severe sepsis and/or septic shock.    As a Marker for Lower Respiratory Tract Infections that require antibiotic therapy:    PCT on Admission    Antibiotic Therapy       6-12 Hrs later    >0.5                Strongly Recommended  >0.25 - <0.5        Recommended   0.1 - 0.25          Discouraged              Remeasure/reassess PCT  <0.1                Strongly Discouraged     Remeasure/reassess PCT    As 28 day mortality risk marker: \"Change in Procalcitonin Result\" (>80% or <=80%) if Day 0 (or Day 1) and Day 4 values are available. Refer to http://[email or web address] [email or web address] [email or web address]pct-calculator.com    Change in PCT <=80%  A decrease of PCT levels below or equal to 80% defines a positive change in PCT test result representing a higher risk for 28-day all-cause mortality of patients diagnosed with severe sepsis for septic shock.    Change in PCT >80%  A decrease of PCT levels of more than 80% defines a negative change in PCT result representing a lower risk for 28-day all-cause mortality of patients diagnosed with severe sepsis or septic shock.       Lipase [[id number]]  (Normal) Collected: 04/02/24 1510    Specimen: Blood Updated: 04/02/24 1753     Lipase 43 U/L     POC Glucose Once [[id number]]  (Abnormal) Collected: 04/02/24 1724    Specimen: Blood Updated: 04/02/24 1725     Glucose 195 mg/dL     Blood Gas, Arterial With Co-Ox [[id number]]  (Abnormal) Collected: 04/02/24 1635    Specimen: Arterial Blood Updated: 04/02/24 1635     Site Right Radial     Gian's Test N/A     pH, Arterial 7.258 pH units      Comment: 84 Value below reference range        pCO2, Arterial 49.3 mm Hg      Comment: 83 Value above reference range        pO2, Arterial 228.0 mm Hg      Comment: 83 Value above reference range        HCO3, Arterial 22.0 mmol/L      Base Excess, Arterial -5.6 mmol/L      Hemoglobin, Blood Gas 16.2 g/dL      Hematocrit, Blood Gas 49.6 %      Oxyhemoglobin 96.4 %     "  Methemoglobin 0.40 %      Carboxyhemoglobin 3.2 %      Comment: 83 Value above reference range        CO2 Content 23.5 mmol/L      Temperature 37.0     Barometric Pressure for Blood Gas --     Comment: N/A        Modality Ventilator     FIO2 100 %      Ventilator Mode VC+/AC     Set Tidal Volume 0.45     Rate 14 Breaths/minute      PEEP 5.0     PIP 0 cmH2O      Comment: Meter: [id number]     :  [id number]        IPAP 0     EPAP 0     pH, Temp Corrected 7.258 pH Units      pCO2, Temperature Corrected 49.3 mm Hg      pO2, Temperature Corrected 228 mm Hg     Lactic Acid, Plasma [[id number]]  (Normal) Collected: 04/02/24 1510    Specimen: Blood Updated: 04/02/24 1617     Lactate 1.2 mmol/L      Comment: Falsely depressed results may occur on samples drawn from patients receiving N-Acetylcysteine (NAC) or Metamizole.       Gold Top - SST [[id number]] Collected: 04/02/24 1510    Specimen: Blood Updated: 04/02/24 1615     Extra Tube Hold for add-ons.     Comment: Auto resulted.       Green Top (Gel) [[id number]] Collected: 04/02/24 1507    Specimen: Blood Updated: 04/02/24 1615     Extra Tube Hold for add-ons.     Comment: Auto resulted.       Lavender Top [[id number]] Collected: 04/02/24 1510    Specimen: Blood Updated: 04/02/24 1615     Extra Tube hold for add-on     Comment: Auto resulted       Light Blue Top [[id number]] Collected: 04/02/24 1507    Specimen: Blood Updated: 04/02/24 1615     Extra Tube Hold for add-ons.     Comment: Auto resulted       Fentanyl, Urine - Urine, Clean Catch [[id number]]  (Normal) Collected: 04/02/24 1513    Specimen: Urine, Clean Catch Updated: 04/02/24 1550     Fentanyl, Urine Negative    Narrative:      Negative Threshold:      Fentanyl 5 ng/mL     The normal value for the drug tested is negative. This report includes final unconfirmed screening results to be used for medical treatment purposes only. Unconfirmed results must not be used for non-medical purposes such as employment  or legal testing. Clinical consideration should be applied to any drug of abuse test, particularly when unconfirmed results are used.           TSH [[id number]]  (Normal) Collected: 04/02/24 1510    Specimen: Blood Updated: 04/02/24 1546     TSH 1.850 uIU/mL     T4, Free [[id number]]  (Abnormal) Collected: 04/02/24 1510    Specimen: Blood Updated: 04/02/24 1546     Free T4 0.83 ng/dL     Acetaminophen Level [[id number]]  (Normal) Collected: 04/02/24 1510    Specimen: Blood Updated: 04/02/24 1541     Acetaminophen <5.0 mcg/mL     Salicylate Level [[id number]]  (Normal) Collected: 04/02/24 1510    Specimen: Blood Updated: 04/02/24 1541     Salicylate <0.3 mg/dL     Comprehensive Metabolic Panel [[id number]]  (Abnormal) Collected: 04/02/24 1507    Specimen: Blood Updated: 04/02/24 1539     Glucose 104 mg/dL      BUN 15 mg/dL      Creatinine 0.82 mg/dL      Sodium 136 mmol/L      Potassium 3.8 mmol/L      Comment: Slight hemolysis detected by analyzer. Result may be falsely elevated.        Chloride 104 mmol/L      CO2 23.0 mmol/L      Calcium 9.3 mg/dL      Total Protein 6.9 g/dL      Albumin 4.2 g/dL      ALT (SGPT) 11 U/L      AST (SGOT) 13 U/L      Alkaline Phosphatase 106 U/L      Total Bilirubin 0.3 mg/dL      Globulin 2.7 gm/dL      Comment: Calculated Result        A/G Ratio 1.6 g/dL      BUN/Creatinine Ratio 18.3     Anion Gap 9.0 mmol/L      eGFR 86.2 mL/min/1.73     Narrative:      GFR Normal >60  Chronic Kidney Disease <60  Kidney Failure <15      Magnesium [[id number]]  (Normal) Collected: 04/02/24 1507    Specimen: Blood Updated: 04/02/24 1539     Magnesium 2.1 mg/dL     Ethanol [[id number]]  (Normal) Collected: 04/02/24 1510    Specimen: Blood Updated: 04/02/24 1539     Ethanol <10 mg/dL     Narrative:      Elevated lactic acid concentration and lactate dehydrogenase(LD) activity may falsely elevate enzymatically determined ethanol levels. Not for legal purposes.     CK [[id number]]  (Normal) Collected: 04/02/24  1510    Specimen: Blood Updated: 04/02/24 1539     Creatine Kinase 52 U/L     Urine Drug Screen - Urine, Clean Catch [[id number]]  (Normal) Collected: 04/02/24 1513    Specimen: Urine, Clean Catch Updated: 04/02/24 1535     THC, Screen, Urine Negative     Phencyclidine (PCP), Urine Negative     Cocaine Screen, Urine Negative     Methamphetamine, Ur Negative     Opiate Screen Negative     Amphetamine Screen, Urine Negative     Benzodiazepine Screen, Urine Negative     Tricyclic Antidepressants Screen Negative     Methadone Screen, Urine Negative     Barbiturates Screen, Urine Negative     Oxycodone Screen, Urine Negative     Buprenorphine, Screen, Urine Negative    Narrative:      Cutoff For Drugs Screened:    Amphetamines               500 ng/ml  Barbiturates               200 ng/ml  Benzodiazepines            150 ng/ml  Cocaine                    150 ng/ml  Methadone                  200 ng/ml  Opiates                    100 ng/ml  Phencyclidine               25 ng/ml  THC                         50 ng/ml  Methamphetamine            500 ng/ml  Tricyclic Antidepressants  300 ng/ml  Oxycodone                  100 ng/ml  Buprenorphine               10 ng/ml    The normal value for all drugs tested is negative. This report includes unconfirmed screening results, with the cutoff values listed, to be used for medical treatment purposes only.  Unconfirmed results must not be used for non-medical purposes such as employment or legal testing.  Clinical consideration should be applied to any drug of abuse test, particularly when unconfirmed results are used.      Single High Sensitivity Troponin T [[id number]]  (Normal) Collected: 04/02/24 1507    Specimen: Blood Updated: 04/02/24 1533     HS Troponin T <6 ng/L     Narrative:      High Sensitive Troponin T Reference Range:  <14.0 ng/L- Negative Female for AMI  <22.0 ng/L- Negative Male for AMI  >=14 - Abnormal Female indicating possible myocardial injury.  >=22 - Abnormal Male  indicating possible myocardial injury.   Clinicians would have to utilize clinical acumen, EKG, Troponin, and serial changes to determine if it is an Acute Myocardial Infarction or myocardial injury due to an underlying chronic condition.         Urinalysis With Microscopic If Indicated (No Culture) - Urine, Clean Catch [[id number]]  (Normal) Collected: 04/02/24 1513    Specimen: Urine, Clean Catch Updated: 04/02/24 1522     Color, UA Yellow     Appearance, UA Clear     pH, UA 6.0     Specific Gravity, UA 1.018     Glucose, UA Negative     Ketones, UA Negative     Bilirubin, UA Negative     Blood, UA Negative     Protein, UA Negative     Leuk Esterase, UA Negative     Nitrite, UA Negative     Urobilinogen, UA 0.2 E.U./dL    Narrative:      Urine microscopic not indicated.    CBC & Differential [[id number]]  (Normal) Collected: 04/02/24 1510    Specimen: Blood Updated: 04/02/24 1516    Narrative:      The following orders were created for panel order CBC & Differential.  Procedure                               Abnormality         Status                     ---------                               -----------         ------                     CBC Auto Differential[[id number]]        Normal              Final result                 Please view results for these tests on the individual orders.    CBC Auto Differential [[id number]]  (Normal) Collected: 04/02/24 1510    Specimen: Blood Updated: 04/02/24 1516     WBC 6.33 10*3/mm3      RBC 4.94 10*6/mm3      Hemoglobin 15.3 g/dL      Hematocrit 44.5 %      MCV 90.1 fL      MCH 31.0 pg      MCHC 34.4 g/dL      RDW 13.4 %      RDW-SD 44.5 fl      MPV 11.4 fL      Platelets 186 10*3/mm3      Neutrophil % 44.2 %      Lymphocyte % 42.2 %      Monocyte % 9.3 %      Eosinophil % 3.3 %      Basophil % 0.8 %      Immature Grans % 0.2 %      Neutrophils, Absolute 2.80 10*3/mm3      Lymphocytes, Absolute 2.67 10*3/mm3      Monocytes, Absolute 0.59 10*3/mm3      Eosinophils, Absolute 0.21  10*3/mm3      Basophils, Absolute 0.05 10*3/mm3      Immature Grans, Absolute 0.01 10*3/mm3      nRBC 0.0 /100 WBC     POC Protime / INR [[id number]]  (Abnormal) Collected: 04/02/24 1500    Specimen: Blood Updated: 04/02/24 1504     Protime 15.6 seconds      INR 1.3     Comment: Serial Number: [id number]:  [id number]       POC CHEM 8 [[id number]]  (Abnormal) Collected: 04/02/24 1501    Specimen: Blood Updated: 04/02/24 1503     Glucose 100 mg/dL      BUN 16 mg/dL      Creatinine 1.00 mg/dL      Sodium 142 mmol/L      POC Potassium 3.7 mmol/L      Chloride 107 mmol/L      Total CO2 22 mmol/L      Hemoglobin 14.6 g/dL      Comment: Serial Number: [id number]  [id number]        Hematocrit 43 %      Ionized Calcium 1.24 mmol/L      eGFR 67.9 mL/min/1.73           Imaging Results (All)       Procedure Component Value Units Date/Time    MRI Brain Without Contrast [[id number]] Collected: 04/03/24 0304     Updated: 04/03/24 0308    Narrative:      MRI BRAIN WO CONTRAST    Date of Exam: 4/3/2024 2:30 AM EDT    Indication: Stroke, follow up  unresponsiveness.     Comparison: 4/2/2024.    Technique:  Routine multiplanar/multisequence sequence images of the brain were obtained without contrast administration.      Findings:  There is no diffusion restriction to suggest acute infarct. There is no evidence of acute or chronic intracranial hemorrhage. No mass effect or midline shift. No abnormal extra-axial collections. The major vascular flow voids appear intact. The mild   periventricular and subcortical FLAIR signal changes are present. Basal ganglia, brainstem and cerebellum appear within normal limits. Calvarial and superficial soft tissue signal is within normal limits. Orbits appear unremarkable. The paranasal sinuses   and the mastoid air cells appear well aerated. Midline structures are intact.         Impression:      Impression:    1. No evidence of hemorrhage, mass effect or midline shift. No evidence of recent or acute  ischemia.  2. Mild periventricular and subcortical FLAIR signal changes likely related to chronic microvascular ischemic change.        Electronically Signed: Shanthi Dai MD    4/3/2024 3:04 AM EDT    Workstation ID: [id number]    XR Chest 1 View [[id number]] Collected: 04/02/24 1614     Updated: 04/02/24 1619    Narrative:      XR CHEST 1 VW    Date of Exam: 4/2/2024 4:08 PM EDT    Indication: Weak/Dizzy/AMS triage protocol    Comparison: 1/9/2024    Findings:  ET tube is seen in good position midway between the clavicles and yolie. NG tube is seen passing below the left hemidiaphragm. The heart mediastinum and pulmonary vasculature appear within normal limits. Lungs appear well expanded and clear bilaterally.      Impression:      Impression:    1. ET tube and NG tube appear to be in satisfactory position.    2. No evidence of active chest disease is seen.      Electronically Signed: Jonathan Gonzalez MD    4/2/2024 4:16 PM EDT    Workstation ID: [id number]    XR Abdomen KUB [[id number]] Collected: 04/02/24 1610     Updated: 04/02/24 1614    Narrative:      XR ABDOMEN KUB    Date of Exam: 4/2/2024 3:59 PM EDT    Indication: NG    Comparison: None available.    Findings:  Nasogastric tube terminates proximally in the stomach with the proximal side port near the GE junction.      Impression:      Impression:  Nasogastric tube terminates proximally in the stomach with the proximal side port near the GE junction.        Electronically Signed: Shaquille Avalos MD    4/2/2024 4:11 PM EDT    Workstation ID: [id number]    CT Angiogram Head w AI Analysis of LVO [[id number]] Collected: 04/02/24 1558     Updated: 04/02/24 1612    Narrative:      CT ANGIOGRAM NECK, CT ANGIOGRAM HEAD W AI ANALYSIS OF LVO    Date of Exam: 4/2/2024 3:31 PM EDT    Indication: stroke workup/unresponsive.    Comparison: 1/9/2024    Technique: CTA of the neck was performed before and after the uneventful intravenous administration of 115 cc Isovue-370.  Reconstructed coronal and sagittal images were also obtained. In addition, a 3-D volume rendered image was created for   interpretation. Automated exposure control and iterative reconstruction methods were used.    Findings:    CTA NECK:  *Aortic arch: No aneurysm. No significant stenosis or occlusion of the major arch vessels.  *Left carotid system: Minimal proximal atherosclerotic disease. No aneurysm, significant stenosis or occlusion.  *Right carotid system: Minimal proximal atherosclerotic disease. No aneurysm, significant stenosis or occlusion.  *Vertebrobasilar system: The vertebral arteries arise from their respective subclavian arteries. No aneurysm, significant stenosis or occlusion.    *There is an endotracheal tube, tip between the thoracic inlet and yolie. There is a nasogastric tube present.    CTA HEAD:  *Anterior circulation: No aneurysm, significant stenosis or occlusion.  *Posterior circulation: No aneurysm, significant stenosis or occlusion.  *Anatomic variants: None of significance.  *Venous sinuses: Patent.      Impression:      1.No hemodynamically significant stenosis, large vessel cut or aneurysms in intracranial circulation  2.No hemodynamically significant stenosis, dissection or aneurysms in extracranial circulation.           Electronically Signed: Shlomo Bal MD    4/2/2024 4:07 PM EDT    Workstation ID: [id number]    CT Angiogram Neck [[id number]] Collected: 04/02/24 1558     Updated: 04/02/24 1612    Narrative:      CT ANGIOGRAM NECK, CT ANGIOGRAM HEAD W AI ANALYSIS OF LVO    Date of Exam: 4/2/2024 3:31 PM EDT    Indication: stroke workup/unresponsive.    Comparison: 1/9/2024    Technique: CTA of the neck was performed before and after the uneventful intravenous administration of 115 cc Isovue-370. Reconstructed coronal and sagittal images were also obtained. In addition, a 3-D volume rendered image was created for   interpretation. Automated exposure control and iterative  reconstruction methods were used.    Findings:    CTA NECK:  *Aortic arch: No aneurysm. No significant stenosis or occlusion of the major arch vessels.  *Left carotid system: Minimal proximal atherosclerotic disease. No aneurysm, significant stenosis or occlusion.  *Right carotid system: Minimal proximal atherosclerotic disease. No aneurysm, significant stenosis or occlusion.  *Vertebrobasilar system: The vertebral arteries arise from their respective subclavian arteries. No aneurysm, significant stenosis or occlusion.    *There is an endotracheal tube, tip between the thoracic inlet and yolie. There is a nasogastric tube present.    CTA HEAD:  *Anterior circulation: No aneurysm, significant stenosis or occlusion.  *Posterior circulation: No aneurysm, significant stenosis or occlusion.  *Anatomic variants: None of significance.  *Venous sinuses: Patent.      Impression:      1.No hemodynamically significant stenosis, large vessel cut or aneurysms in intracranial circulation  2.No hemodynamically significant stenosis, dissection or aneurysms in extracranial circulation.           Electronically Signed: Shlomo Bal MD    4/2/2024 4:07 PM EDT    Workstation ID: [id number]    CT CEREBRAL PERFUSION WITH & WITHOUT CONTRAST [[id number]] Collected: 04/02/24 1553     Updated: 04/02/24 1612    Narrative:      CT CEREBRAL PERFUSION W WO CONTRAST    Date of Exam: 4/2/2024 3:31 PM EDT    Indication: Neuro deficit, acute, stroke suspected  stroke workup/unresponsive.     Comparison: 1/9/2024    Technique: Axial CT images of the brain were obtained prior to and after the administration of 115 cc Isovue-370. Core blood volume, core blood flow, mean transit time, and Tmax images were obtained utilizing the Rapid software protocol. A limited CT   angiogram of the head was also performed to measure the blood vessel density.    The radiation dose reduction device was turned on for each scan per the ALARA (As Low as Reasonably  Achievable) protocol.      Findings:             Cerebral blood flow, blood volume, and mean transit time maps are symmetric without large perfusion defect.     CBF<30% volume: 0mL  Tmax>6sec volume: 0 mL  Mismatch volume: 0mL  Mismatch ratio: None                Impression:         1. No evidence of core infarct nor ischemic brain at risk.        Electronically Signed: Shlomo Bal MD    2024 3:58 PM EDT    Workstation ID: [id number]    CT Head Without Contrast Stroke Protocol [[id number]] Collected: 24 153     Updated: 24 153    Narrative:      CT HEAD WO CONTRAST STROKE PROTOCOL    Date of Exam: 2024 3:19 PM EDT    Indication: ams.    Comparison: Head CT 2024    Technique: Axial CT images were obtained of the head without contrast administration.  Reconstructed coronal images were also obtained. Automated exposure control and iterative construction methods were used.    Scan Time: 3:26 p.m. 2024  Results discussed with Dr. Garcia by Dr. Mario Oropeza in person at the CT scanner at 3:28 p.m 2024.      Findings:  No acute intracranial hemorrhage. No acute large territory infarct. No extra-axial collections. No mass effect. Normal size and configuration of the ventricles. Unremarkable appearance of the orbits. Paranasal sinuses and mastoid air cells are clear.   Partial visualization of nasogastric tube; endotracheal tube is noted on the  image.      Impression:      Impression:  No acute intracranial findings.        Electronically Signed: Mario Oropeza MD    2024 3:33 PM EDT    Workstation ID: [id number]          Ventilator/Non-Invasive Ventilation Settings (From admission, onward)      None          Physician Progress Notes (all)    No notes of this type exist for this encounter.          Consult Notes (all)        Amara Valle APRN at 24 1611          Stroke Consult Note    Patient Name: Arcelia Quintero   MRN: [id number]  Age: 53 y.o.  Sex: female  :  "1970    Primary Care Physician: Coby Ely PA-C  Referring Physician: Dr. Garcia    TIME STROKE TEAM CALLED: 15:28 EST     TIME PATIENT SEEN: 15:35 EST    Handedness: Right  Race:      Chief Complaint/Reason for Consultation: Unresponsiveness    Subjective .  HPI: Arcelia Quintero is a 53-year-old, , right-handed female with known diagnosis of HTN, HLD, migraines, conversion disorder, history of seizure disorder including nonepileptic seizures, history of brain tumor s/p resection in 1989 (data deficient, no evidence of this on CT head), COPD, stage III CKD, elevated liver enzymes, and current everyday smoker who presented with unresponsiveness.  Apparently patient was last known well around 1400 when she told her daughter she did not feel well and went to lay down.  When her daughter went to check on her she was found completely unresponsive for which her daughter called EMS.  Upon arrival to [facility] patient reportedly had a GCS of 6 and was completely unresponsive to painful stimuli, unresponsive to sternal rub.  Initial SBP  > 250.  Per ED provider she periodically opened her eyes and looked around the room, she was able to move all extremities but not to command, she did speak saying \"I do not feel well,\" before becoming unresponsive once again.  This happened at least 3 times.  Patient was intubated for airway protection, she was not hypoxic.  Patient was taken for stat CT head and we were asked to see the patient in consult.    On exam patient remains intubated and sedated on a Versed drip.  Her pupils are round and reactive.  No corneal response.  No response to nailbed pressure in any extremity.  Negative babinski.  Patient's daughter is not present but I was able to speak with her boyfriend.  Patient has apparently been complaining of feeling unwell for several days and has been having periods of, \"blacking out,\" at one point she was found on the floor and did not know how she got " there.  He is not sure of what medication she is on but knows that she does not take them regularly.  She follows with Dr. 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  She was taking Topamax for headaches.  Per documentation review patient has had several admissions with a similar presentation and hypertensive emergency in the past.    Last Known Normal Date/Time: estimated 1400    Review of Systems   Unable to perform ROS: Intubated      Past Medical History:   Diagnosis Date    Abnormal Pap smear of cervix     Allergic rhinitis     Asthma     Atypical chest pain     Brain tumor     Status post brain tumor resection in 1989.    Candidiasis of mouth     Cervical high risk HPV (human papillomavirus) test positive 09/01/2020    CKD (chronic kidney disease), stage III     Colon polyps 09/09/2020    sessile and tubular adenoma    Conversion disorder     COPD (chronic obstructive pulmonary disease)     COVID-19     Elevated liver enzymes     Former smoker 07/05/2023    H/O Helicobacter infection      Status post EGD 9/4/2012, pathology revealed H. pylori infection.    Headache     migraines    History of [place] syndrome due to influenza immunization     Neurology evaluation thought it was numbness from carpal tunnel and not [place]    Hyperlipidemia     Hypertension     Influenza     Internal hemorrhoids     Low grade squamous intraepith lesion on cytologic smear cervix (lgsil) 09/01/2020    Median neuropathy     Migraine     PTSD (post-traumatic stress disorder)     Seizures     2 seizures after surgery    Small fiber neuropathy     Stroke     Tinea corporis      Past Surgical History:   Procedure Laterality Date    BRAIN SURGERY      status post brain tumor resection    CARDIAC CATHETERIZATION N/A 12/27/2023    Procedure: Left Heart Cath;  Surgeon: Ryder Delarosa  III, MD;  Location: [facility] INVASIVE LOCATION;  Service: Cardiovascular;  Laterality: N/A;    COLONOSCOPY      polyp removal    LIVER BIOPSY      UPPER GASTROINTESTINAL ENDOSCOPY      Status post EGD 2012, pathology revealed H. pylori infection.     Family History   Adopted: Yes   Problem Relation Age of Onset    Lung disease Mother     Heart disease Mother     Stroke Mother     Throat cancer Mother     Uterine cancer Mother     Ovarian cancer Mother     Prostate cancer Father     No Known Problems Sister     No Known Problems Brother     Breast cancer Other     Diabetes Other     Colon cancer Other      Social History     Socioeconomic History    Marital status: Legally    Tobacco Use    Smoking status: Every Day     Current packs/day: 0.00     Average packs/day: 0.5 packs/day for 41.7 years (20.9 ttl pk-yrs)     Types: Cigarettes     Start date: 1981     Last attempt to quit: 2022     Years since quittin.5     Passive exposure: Current    Smokeless tobacco: Never    Tobacco comments:     quit with chantix in past   Vaping Use    Vaping status: Never Used   Substance and Sexual Activity    Alcohol use: No    Drug use: No    Sexual activity: Yes     Partners: Male     Birth control/protection: Post-menopausal     Allergies   Allergen Reactions    Codeine Hives    Influenza Vaccines Other (See Comments)     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 Authorizing Provider   amLODIPine (NORVASC) 5 MG tablet Take 1 tablet by mouth Daily. 23   Coby Ely PA-C   aspirin 81 MG EC tablet Take 1 tablet by mouth Daily. 23   Karol Fitch APRN   atorvastatin (LIPITOR) 40 MG tablet Take 1 tablet by mouth Daily. 23   Karol Fitch APRN   azithromycin (Zithromax Z-Jeremy) 250 MG tablet Take 2 tablets by mouth on day 1, then 1  tablet daily on days 2-5 2/29/24   Elliott Nam MD   benzonatate (TESSALON) 200 MG capsule Take 1 capsule by mouth 3 (Three) Times a Day As Needed for Cough. 1/9/24   Shlomo Patel MD   Budeson-Glycopyrrol-Formoterol (BREZTRI) 160-9-4.8 MCG/ACT aerosol inhaler Inhale 2 puffs 2 (Two) Times a Day. 11/6/23   Coby Ely PA-C   carvedilol (COREG) 12.5 MG tablet Take 1 tablet by mouth 2 (Two) Times a Day With Meals. 10/9/23   Coby Ely PA-C   FLUoxetine (PROzac) 20 MG capsule Take 1 capsule by mouth Daily. 11/20/23   Mary Canales APRN   lactulose (CHRONULAC) 10 GM/15ML solution Take 45 mL by mouth 2 (Two) Times a Day. 8/17/23   ProviderDenis MD   levETIRAcetam (Keppra) 100 MG/ML solution Take 5 mL by mouth 2 (Two) Times a Day. 1/9/24   Shlomo Patel MD   linaclotide (Linzess) 145 MCG capsule capsule Take 1 capsule by mouth Every Morning Before Breakfast. 9/22/23   Kelby Sawant APRN   methylPREDNISolone (MEDROL) 4 MG dose pack Take as directed on package instructions. 2/29/24   Elliott Nam MD   mirtazapine (REMERON) 7.5 MG tablet Take 2 tablets by mouth Every Night. 11/20/23   Mary Canales APRN   pantoprazole (PROTONIX) 40 MG EC tablet TAKE 1 TABLET BY MOUTH DAILY 11/17/23   Coby Ely PA-C   rizatriptan (Maxalt) 10 MG tablet Take 1 tablet at onset of headache.  May repeat once in 2 hours.  Do not take more than 2 tabs a day or 8 tabs a month 9/28/23   Carolina Tello MD   topiramate (TOPAMAX) 25 MG tablet TAKE ONE TABLET BY MOUTH ONCE NIGHTLY FOR 7 DAYS THEN TAKE 1 TABLET BY MOUTH TWICE A DAY FOR 7 DAYS THEN TAKE ONE TABLET BY MOUTH EVERY MORNING AND 2 TABLETS AT NIGHT FOR 7 DAYS  Patient taking differently: Take 1 tablet by mouth 2 (Two) Times a Day. TAKE ONE TABLET BY MOUTH ONCE NIGHTLY FOR 7 DAYS THEN TAKE 1 TABLET BY MOUTH TWICE A DAY FOR 7 DAYS THEN TAKE ONE TABLET BY MOUTH EVERY MORNING AND 2 TABLETS AT NIGHT FOR 7 DAYS  10/16/23   Carolina Tello MD            Objective     Temp:  [97.4 °F (36.3 °C)] 97.4 °F (36.3 °C)  Heart Rate:  [] 118  Resp:  [16] 16  BP: (217-250)/(119-166) 219/142  FiO2 (%):  [100 %] 100 %  Neurological Exam  Mental Status  Unresponsive to painful stimuli. Patient is nonverbal. Language: intubated.    Cranial Nerves  CN II: No response to visual threat.  CN III, IV, VI: Pupils equal round and reactive to light bilaterally.  CN V:  Right: Absent corneal reflex.  Left: Absent corneal reflex.  CN VII: ETT.  CN IX, X:  Right: Gag is reduced.  CN XI: Shoulder shrug strength is normal.    Motor  Normal muscle bulk throughout. Normal muscle tone. Strength is 5/5 in all four extremities except as noted.  0/5 x 4.    Sensory  Pinprick abnormality:     Reflexes  Right Plantar: mute  Left Plantar: mute    Gait    Not tested.      Physical Exam  Vitals reviewed.   Constitutional:       Interventions: She is sedated and intubated.   HENT:      Head: Normocephalic and atraumatic.   Eyes:      Pupils: Pupils are equal, round, and reactive to light.   Cardiovascular:      Rate and Rhythm: Regular rhythm. Tachycardia present.   Pulmonary:      Effort: Pulmonary effort is normal. She is intubated.      Comments: intubated  Musculoskeletal:         General: No swelling.      Cervical back: Normal range of motion.   Skin:     General: Skin is warm and dry.   Neurological:      Sensory: No sensory deficit.      Motor: Weakness present.         Acute Stroke Data    IV Thrombolytic (TPA/Tenecteplase) Inclusion / Exclusion Criteria    Time: 16:11 EDT  Person Administering Scale: LEANN Up    Inclusion Criteria  [x]   18 years of age or greater   []   Onset of symptoms < 4.5 hours before beginning treatment (stroke onset = time patient was last seen well or without symptoms).   []   Diagnosis of acute ischemic stroke causing measurable disabling deficit (Complete Hemianopia, Any Aphasia, Visual or Sensory  Extinction, Any weakness limiting sustained effort against gravity)   []   Any remaining deficit considered potentially disabling in view of patient and practitioner   Exclusion criteria (Do not proceed with Alteplase if any are checked under exclusion criteria)  []   Onset unknown or GREATER than 4.5 hours   []   ICH on CT/MRI   []   CT demonstrates hypodensity representing acute or subacute infarct   []   Significant head trauma or prior stroke in the previous 3 months   []   Symptoms suggestive of subarachnoid hemorrhage   []   History of un-ruptured intracranial aneurysm GREATER than 10 mm   []   Recent intracranial or intraspinal surgery within the last 3 months   []   Arterial puncture at a non-compressible site in the previous 7 days   []   Active internal bleeding   []   Acute bleeding tendency   []   Platelet count LESS than 100,000 for known hematological diseases such as leukemia, thrombocytopenia or chronic cirrhosis   []   Current use of anticoagulant with INR GREATER than 1.7 or PT GREATER than 15 seconds, aPTT GREATER than 40 seconds   []   Heparin received within 48 hours, resulting in abnormally elevated aPTT GREATER than upper limit of normal   []   Current use of direct thrombin inhibitors or direct factor Xa inhibitors in the past 48 hours   []   Elevated blood pressure refractory to treatment (systolic GREATER than 185 mm/Hg or diastolic  GREATER than 110 mm/Hg   []   Suspected infective endocarditis and aortic arch dissection   []   Current use of therapeutic treatment dose of low-molecular-weight heparin (LMWH) within the previous 24 hours   []   Structural GI malignancy or bleed   Relative exclusion for all patients  []   Only minor nondisabling symptoms   []   Pregnancy   [x]   Seizure at onset with postictal residual neurological impairments   []   Major surgery or previous trauma within past 14 days   []   History of previous spontaneous ICH, intracranial neoplasm, or AV malformation   []    Postpartum (within previous 14 days)   []   Recent GI or urinary tract hemorrhage (within previous 21 days)   []   Recent acute MI (within previous 3 months)   []   History of unruptured intracranial aneurysm LESS than 10 mm   []   History of ruptured intracranial aneurysm   []   Blood glucose LESS than 50 mg/dL (2.7 mmol/L)   []   Dural puncture within the last 7 days   []   Known GREATER than 10 cerebral microbleeds   Additional exclusions for patients with symptoms onset between 3 and 4.5 hours.  []   Age > 80.   []   On any anticoagulants regardless of INR  >>> Warfarin (Coumadin), Heparin, Enoxaparin (Lovenox), fondaparinux (Arixtra), bivalirudin (Angiomax), Argatroban, dabigatran (Pradaxa), rivaroxaban (Xarelto), or apixaban (Eliquis)   []   Severe stroke (NIHSS > 25).   []   History of BOTH diabetes and previous ischemic stroke.   []   The risks and benefits have been discussed with the patient or family related to the administration of IV alteplase for stroke symptoms.   []   I have discussed and reviewed the patient's case and imaging with the attending prior to IV Thrombolytic (TPA/Tenecteplase).    Time Thrombolytic administered   Non focal [facility]    Hospital Meds:  Scheduled- senna-docusate sodium, 2 tablet, Oral, BID  sodium chloride, 10 mL, Intravenous, Q12H      Infusions- midazolam, 1-10 mg/hr, Last Rate: 1 mg/hr (04/02/24 8043)       PRNs-   senna-docusate sodium **AND** polyethylene glycol **AND** bisacodyl **AND** bisacodyl    Calcium Replacement - Follow Nurse / BPA Driven Protocol    Magnesium Standard Dose Replacement - Follow Nurse / BPA Driven Protocol    nitroglycerin    Phosphorus Replacement - Follow Nurse / BPA Driven Protocol    Potassium Replacement - Follow Nurse / BPA Driven Protocol    sodium chloride    sodium chloride    sodium chloride    Functional Status Prior to Current Stroke/Tenzin Score: MRS 0    NIH Stroke Scale  Time: 16:11 EDT  Person Administering Scale: Amara Valle,  APRN    1a  Level of consciousness: 3=responds only with reflex motor or automatic effects or totally unresponsive, flaccid, areflexic   1b. LOC questions:  1=intubated   1c. LOC commands: 2=Performs neither task correctly   2.  Best Gaze: 2=forced deviation, or total gaze paresis not overcome by oculocephalic maneuver   3.  Visual: 3=Bilateral hemianopia (blind including cortical blindness)   4. Facial Palsy: 0=Normal symmetric movement   5a.  Motor left arm: 4=No movement   5b.  Motor right arm: 4=No movement   6a. motor left le=No movement   6b  Motor right le=No movement   7. Limb Ataxia: 0=Absent   8.  Sensory: 0=Normal; no sensory loss   9. Best Language:  3=Mute, global aphasia; no usable speech or auditory comprehension   10. Dysarthria: UN= intubated   11. Extinction and Inattention: 0=No abnormality    Total:   30       Results Reviewed:  I have personally reviewed current lab, radiology, and data and agree with results.    WBC   Date Value Ref Range Status   2024 6.33 3.40 - 10.80 10*3/mm3 Final     RBC   Date Value Ref Range Status   2024 4.94 3.77 - 5.28 10*6/mm3 Final     Hemoglobin   Date Value Ref Range Status   2024 15.3 12.0 - 15.9 g/dL Final   2024 14.6 12.0 - 17.0 g/dL Final     Comment:     Serial Number: [id number]:  [id number]     Hematocrit   Date Value Ref Range Status   2024 44.5 34.0 - 46.6 % Final   2024 43 38 - 51 % Final     MCV   Date Value Ref Range Status   2024 90.1 79.0 - 97.0 fL Final     MCH   Date Value Ref Range Status   2024 31.0 26.6 - 33.0 pg Final     MCHC   Date Value Ref Range Status   2024 34.4 31.5 - 35.7 g/dL Final     RDW   Date Value Ref Range Status   2024 13.4 12.3 - 15.4 % Final     RDW-SD   Date Value Ref Range Status   2024 44.5 37.0 - 54.0 fl Final     MPV   Date Value Ref Range Status   2024 11.4 6.0 - 12.0 fL Final     Platelets   Date Value Ref Range Status   2024 186 140  - 450 10*3/mm3 Final     Neutrophil %   Date Value Ref Range Status   04/02/2024 44.2 42.7 - 76.0 % Final     Lymphocyte %   Date Value Ref Range Status   04/02/2024 42.2 19.6 - 45.3 % Final     Monocyte %   Date Value Ref Range Status   04/02/2024 9.3 5.0 - 12.0 % Final     Eosinophil %   Date Value Ref Range Status   04/02/2024 3.3 0.3 - 6.2 % Final     Basophil %   Date Value Ref Range Status   04/02/2024 0.8 0.0 - 1.5 % Final     Immature Grans %   Date Value Ref Range Status   04/02/2024 0.2 0.0 - 0.5 % Final     Neutrophils, Absolute   Date Value Ref Range Status   04/02/2024 2.80 1.70 - 7.00 10*3/mm3 Final     Lymphocytes, Absolute   Date Value Ref Range Status   04/02/2024 2.67 0.70 - 3.10 10*3/mm3 Final     Monocytes, Absolute   Date Value Ref Range Status   04/02/2024 0.59 0.10 - 0.90 10*3/mm3 Final     Eosinophils, Absolute   Date Value Ref Range Status   04/02/2024 0.21 0.00 - 0.40 10*3/mm3 Final     Basophils, Absolute   Date Value Ref Range Status   04/02/2024 0.05 0.00 - 0.20 10*3/mm3 Final     Immature Grans, Absolute   Date Value Ref Range Status   04/02/2024 0.01 0.00 - 0.05 10*3/mm3 Final     nRBC   Date Value Ref Range Status   04/02/2024 0.0 0.0 - 0.2 /100 WBC Final     Lab Results   Component Value Date    GLUCOSE 104 (H) 04/02/2024    BUN 15 04/02/2024    CREATININE 0.82 04/02/2024    EGFR 86.2 04/02/2024    BCR 18.3 04/02/2024    K 3.8 04/02/2024    CO2 23.0 04/02/2024    CALCIUM 9.3 04/02/2024    ALBUMIN 4.2 04/02/2024    BILITOT 0.3 04/02/2024    AST 13 04/02/2024    ALT 11 04/02/2024     CK 52   lactate 1.2  Ethanol <10  UDS negative      CT Angiogram Head w AI Analysis of LVO    Result Date: 4/2/2024  1.No hemodynamically significant stenosis, large vessel cut or aneurysms in intracranial circulation 2.No hemodynamically significant stenosis, dissection or aneurysms in extracranial circulation. Electronically Signed: Shlomo Bal MD  4/2/2024 4:07 PM EDT  Workstation ID: [id number]    CT  "Angiogram Neck    Result Date: 4/2/2024  1.No hemodynamically significant stenosis, large vessel cut or aneurysms in intracranial circulation 2.No hemodynamically significant stenosis, dissection or aneurysms in extracranial circulation. Electronically Signed: Shlomo Bal MD  4/2/2024 4:07 PM EDT  Workstation ID: [id number]    CT CEREBRAL PERFUSION WITH & WITHOUT CONTRAST    Result Date: 4/2/2024   1. No evidence of core infarct nor ischemic brain at risk. Electronically Signed: Shlomo Bal MD  4/2/2024 3:58 PM EDT  Workstation ID: [id number]    CT Head Without Contrast Stroke Protocol    Result Date: 4/2/2024  Impression: No acute intracranial findings. Electronically Signed: Mario Oropeza MD  4/2/2024 3:33 PM EDT  Workstation ID: [id number]       Results for orders placed during the hospital encounter of 11/21/23    Adult Transthoracic Echo Complete W/ Cont if Necessary Per Protocol    Interpretation Summary    Left ventricular systolic function is normal. Calculated left ventricular EF = 61% Left ventricular ejection fraction appears to be 61 - 65%.    Left ventricular diastolic function was normal.       Assessment/Plan:  53-year-old, , right-handed female with known diagnosis of HTN, HLD, migraines, conversion disorder, history of seizure disorder including nonepileptic seizures, history of brain tumor s/p resection in 1989 (data deficient, no evidence of this on CT head), COPD, stage III CKD, elevated liver enzymes, and current everyday smoker who presented with unresponsiveness.  Last known well around 1400, patient was found unresponsive by family soon after.  Upon arrival to [facility] patient reportedly had a GCS of 6 and was completely unresponsive to painful stimuli, unresponsive to sternal rub.  Initial SBP  > 250.  Per ED provider she periodically opened her eyes and looked around the room, she was able to move all extremities but not to command, she did speak saying \"I do not feel well,\" before " becoming unresponsive once again.  This happened at least 3 times.  Patient was intubated for airway protection, she was not hypoxic.  NIHS 30; (patient was evaluated soon after being intubated and sedated).  Not a candidate for IV TNK given nonfocal exam and concern for possible seizure.    CT head is negative for acute abnormality  CT perfusion is negative  CTA head and neck is negative for hemodynamically significant stenosis, no LVO or aneurysm    PTA antiplatelet: Aspirin 81 mg daily  PTA anticoagulant: None      Unresponsiveness  -Suspect hypertensive emergency rather than cerebrovascular event or seizure  -Recommend stat MRI to evaluate for any possibility of stroke  -Cardene for SBP > 220; if MRI is negative for stroke would recommend goal systolic 200  -Will hold off on adding stroke order set pending MRI results  -Received Keppra 1500 mg in the ED  -Follows with Dr. Tello; has a history of PNES. Home Keppra was DC'd per Dr. Tello.  Patient is on topamax for migraines  -Family reports noncompliance with medications  -Consider EEG in a.m. if not improved  -If MRI is negative will consult general neurology    Plan of care was discussed with family and Dr. Garcia.    LEANN Up  April 2, 2024  16:11 EDT                Electronically signed by Amara Valle APRN at 04/02/24 [id number]

## 2024-04-04 ENCOUNTER — APPOINTMENT (OUTPATIENT)
Dept: CARDIOLOGY | Facility: HOSPITAL | Age: 54
DRG: 077 | End: 2024-04-04
Payer: MEDICARE

## 2024-04-04 ENCOUNTER — APPOINTMENT (OUTPATIENT)
Dept: GENERAL RADIOLOGY | Facility: HOSPITAL | Age: 54
DRG: 077 | End: 2024-04-04
Payer: MEDICARE

## 2024-04-04 LAB
ANION GAP SERPL CALCULATED.3IONS-SCNC: 7 MMOL/L (ref 5–15)
ARTERIAL PATENCY WRIST A: ABNORMAL
ATMOSPHERIC PRESS: ABNORMAL MM[HG]
BASE EXCESS BLDA CALC-SCNC: -2.4 MMOL/L (ref 0–2)
BDY SITE: ABNORMAL
BH CV ECHO MEAS - AO MAX PG: 6 MMHG
BH CV ECHO MEAS - AO MEAN PG: 3 MMHG
BH CV ECHO MEAS - AO ROOT DIAM: 2.9 CM
BH CV ECHO MEAS - AO V2 MAX: 122 CM/SEC
BH CV ECHO MEAS - AO V2 VTI: 24.1 CM
BH CV ECHO MEAS - AVA(I,D): 3.1 CM2
BH CV ECHO MEAS - EDV(CUBED): 117.6 ML
BH CV ECHO MEAS - EDV(MOD-SP2): 97.5 ML
BH CV ECHO MEAS - EDV(MOD-SP4): 102 ML
BH CV ECHO MEAS - EF(MOD-BP): 62.8 %
BH CV ECHO MEAS - EF(MOD-SP2): 70.4 %
BH CV ECHO MEAS - EF(MOD-SP4): 60.2 %
BH CV ECHO MEAS - ESV(CUBED): 27 ML
BH CV ECHO MEAS - ESV(MOD-SP2): 28.9 ML
BH CV ECHO MEAS - ESV(MOD-SP4): 40.6 ML
BH CV ECHO MEAS - FS: 38.8 %
BH CV ECHO MEAS - IVS/LVPW: 1.43 CM
BH CV ECHO MEAS - IVSD: 1 CM
BH CV ECHO MEAS - LA DIMENSION: 2.6 CM
BH CV ECHO MEAS - LAT PEAK E' VEL: 8.6 CM/SEC
BH CV ECHO MEAS - LV MASS(C)D: 141.9 GRAMS
BH CV ECHO MEAS - LV MAX PG: 5.6 MMHG
BH CV ECHO MEAS - LV MEAN PG: 3 MMHG
BH CV ECHO MEAS - LV V1 MAX: 118 CM/SEC
BH CV ECHO MEAS - LV V1 VTI: 23.6 CM
BH CV ECHO MEAS - LVIDD: 4.9 CM
BH CV ECHO MEAS - LVIDS: 3 CM
BH CV ECHO MEAS - LVOT AREA: 3.1 CM2
BH CV ECHO MEAS - LVOT DIAM: 2 CM
BH CV ECHO MEAS - LVPWD: 0.7 CM
BH CV ECHO MEAS - MED PEAK E' VEL: 10.6 CM/SEC
BH CV ECHO MEAS - MV A MAX VEL: 101 CM/SEC
BH CV ECHO MEAS - MV DEC SLOPE: 391 CM/SEC2
BH CV ECHO MEAS - MV DEC TIME: 0.21 SEC
BH CV ECHO MEAS - MV E MAX VEL: 85.8 CM/SEC
BH CV ECHO MEAS - MV E/A: 0.85
BH CV ECHO MEAS - MV MAX PG: 3.8 MMHG
BH CV ECHO MEAS - MV MEAN PG: 1 MMHG
BH CV ECHO MEAS - MV P1/2T: 69.2 MSEC
BH CV ECHO MEAS - MV V2 VTI: 29.1 CM
BH CV ECHO MEAS - MVA(P1/2T): 3.2 CM2
BH CV ECHO MEAS - MVA(VTI): 2.5 CM2
BH CV ECHO MEAS - PA ACC TIME: 0.12 SEC
BH CV ECHO MEAS - PA V2 MAX: 76.1 CM/SEC
BH CV ECHO MEAS - SV(LVOT): 74.1 ML
BH CV ECHO MEAS - SV(MOD-SP2): 68.6 ML
BH CV ECHO MEAS - SV(MOD-SP4): 61.4 ML
BH CV ECHO MEAS - TAPSE (>1.6): 2.7 CM
BH CV ECHO MEASUREMENTS AVERAGE E/E' RATIO: 8.94
BH CV XLRA - RV BASE: 3.3 CM
BH CV XLRA - RV LENGTH: 5.6 CM
BH CV XLRA - RV MID: 2.2 CM
BH CV XLRA - TDI S': 15.4 CM/SEC
BODY TEMPERATURE: 37
BUN SERPL-MCNC: 24 MG/DL (ref 6–20)
BUN/CREAT SERPL: 32.4 (ref 7–25)
CALCIUM SPEC-SCNC: 8.6 MG/DL (ref 8.6–10.5)
CHLORIDE SERPL-SCNC: 103 MMOL/L (ref 98–107)
CO2 BLDA-SCNC: 24.4 MMOL/L (ref 22–33)
CO2 SERPL-SCNC: 22 MMOL/L (ref 22–29)
COHGB MFR BLD: 1.1 % (ref 0–2)
CREAT SERPL-MCNC: 0.74 MG/DL (ref 0.57–1)
DEPRECATED RDW RBC AUTO: 47.6 FL (ref 37–54)
EGFRCR SERPLBLD CKD-EPI 2021: 97.5 ML/MIN/1.73
EPAP: 0
ERYTHROCYTE [DISTWIDTH] IN BLOOD BY AUTOMATED COUNT: 13.5 % (ref 12.3–15.4)
GLUCOSE BLDC GLUCOMTR-MCNC: 104 MG/DL (ref 70–130)
GLUCOSE BLDC GLUCOMTR-MCNC: 105 MG/DL (ref 70–130)
GLUCOSE BLDC GLUCOMTR-MCNC: 139 MG/DL (ref 70–130)
GLUCOSE BLDC GLUCOMTR-MCNC: 99 MG/DL (ref 70–130)
GLUCOSE SERPL-MCNC: 132 MG/DL (ref 65–99)
HCO3 BLDA-SCNC: 23.1 MMOL/L (ref 20–26)
HCT VFR BLD AUTO: 41.1 % (ref 34–46.6)
HCT VFR BLD CALC: 41.2 % (ref 38–51)
HGB BLD-MCNC: 13.3 G/DL (ref 12–15.9)
HGB BLDA-MCNC: 13.4 G/DL (ref 14–18)
INHALED O2 CONCENTRATION: 40 %
IPAP: 0
LEFT ATRIUM VOLUME INDEX: 17 ML/M2
MAGNESIUM SERPL-MCNC: 2.4 MG/DL (ref 1.6–2.6)
MCH RBC QN AUTO: 30.7 PG (ref 26.6–33)
MCHC RBC AUTO-ENTMCNC: 32.4 G/DL (ref 31.5–35.7)
MCV RBC AUTO: 94.9 FL (ref 79–97)
METHGB BLD QL: 0.3 % (ref 0–1.5)
MODALITY: ABNORMAL
OXYHGB MFR BLDV: 95.3 % (ref 94–99)
PAW @ PEAK INSP FLOW SETTING VENT: 0 CMH2O
PCO2 BLDA: 41.9 MM HG (ref 35–45)
PCO2 TEMP ADJ BLD: 41.9 MM HG (ref 35–45)
PEEP RESPIRATORY: 5 CM[H2O]
PH BLDA: 7.35 PH UNITS (ref 7.35–7.45)
PH, TEMP CORRECTED: 7.35 PH UNITS
PHOSPHATE SERPL-MCNC: 2.6 MG/DL (ref 2.5–4.5)
PLATELET # BLD AUTO: 166 10*3/MM3 (ref 140–450)
PMV BLD AUTO: 11.4 FL (ref 6–12)
PO2 BLDA: 83 MM HG (ref 83–108)
PO2 TEMP ADJ BLD: 83 MM HG (ref 83–108)
POTASSIUM SERPL-SCNC: 4 MMOL/L (ref 3.5–5.2)
QT INTERVAL: 338 MS
QT INTERVAL: 474 MS
QTC INTERVAL: 469 MS
QTC INTERVAL: 592 MS
RBC # BLD AUTO: 4.33 10*6/MM3 (ref 3.77–5.28)
SODIUM SERPL-SCNC: 132 MMOL/L (ref 136–145)
TOTAL RATE: 22 BREATHS/MINUTE
VENTILATOR MODE: ABNORMAL
VT ON VENT VENT: 0.45 ML
WBC NRBC COR # BLD AUTO: 12.79 10*3/MM3 (ref 3.4–10.8)

## 2024-04-04 PROCEDURE — 25010000002 VANCOMYCIN HCL IN NACL 1.5-0.9 GM/500ML-% SOLUTION

## 2024-04-04 PROCEDURE — 94003 VENT MGMT INPAT SUBQ DAY: CPT

## 2024-04-04 PROCEDURE — 82948 REAGENT STRIP/BLOOD GLUCOSE: CPT

## 2024-04-04 PROCEDURE — 94799 UNLISTED PULMONARY SVC/PX: CPT

## 2024-04-04 PROCEDURE — 94761 N-INVAS EAR/PLS OXIMETRY MLT: CPT

## 2024-04-04 PROCEDURE — 82375 ASSAY CARBOXYHB QUANT: CPT

## 2024-04-04 PROCEDURE — 93306 TTE W/DOPPLER COMPLETE: CPT

## 2024-04-04 PROCEDURE — 80048 BASIC METABOLIC PNL TOTAL CA: CPT | Performed by: INTERNAL MEDICINE

## 2024-04-04 PROCEDURE — 25010000002 CEFEPIME PER 500 MG: Performed by: INTERNAL MEDICINE

## 2024-04-04 PROCEDURE — 25010000002 FENTANYL CITRATE (PF) 50 MCG/ML SOLUTION: Performed by: NURSE PRACTITIONER

## 2024-04-04 PROCEDURE — 71045 X-RAY EXAM CHEST 1 VIEW: CPT

## 2024-04-04 PROCEDURE — 83050 HGB METHEMOGLOBIN QUAN: CPT

## 2024-04-04 PROCEDURE — 83735 ASSAY OF MAGNESIUM: CPT | Performed by: INTERNAL MEDICINE

## 2024-04-04 PROCEDURE — 99233 SBSQ HOSP IP/OBS HIGH 50: CPT | Performed by: INTERNAL MEDICINE

## 2024-04-04 PROCEDURE — 94664 DEMO&/EVAL PT USE INHALER: CPT

## 2024-04-04 PROCEDURE — 84100 ASSAY OF PHOSPHORUS: CPT | Performed by: INTERNAL MEDICINE

## 2024-04-04 PROCEDURE — 36600 WITHDRAWAL OF ARTERIAL BLOOD: CPT

## 2024-04-04 PROCEDURE — 85027 COMPLETE CBC AUTOMATED: CPT | Performed by: INTERNAL MEDICINE

## 2024-04-04 PROCEDURE — 92610 EVALUATE SWALLOWING FUNCTION: CPT

## 2024-04-04 PROCEDURE — 99232 SBSQ HOSP IP/OBS MODERATE 35: CPT | Performed by: PSYCHIATRY & NEUROLOGY

## 2024-04-04 PROCEDURE — 25810000003 LACTATED RINGERS PER 1000 ML

## 2024-04-04 PROCEDURE — 25010000002 MIDAZOLAM PER 1 MG

## 2024-04-04 PROCEDURE — 93306 TTE W/DOPPLER COMPLETE: CPT | Performed by: INTERNAL MEDICINE

## 2024-04-04 PROCEDURE — 82805 BLOOD GASES W/O2 SATURATION: CPT

## 2024-04-04 RX ADMIN — SENNOSIDES AND DOCUSATE SODIUM 2 TABLET: 8.6; 5 TABLET ORAL at 20:06

## 2024-04-04 RX ADMIN — CEFEPIME 2000 MG: 2 INJECTION, POWDER, FOR SOLUTION INTRAVENOUS at 20:06

## 2024-04-04 RX ADMIN — DEXMEDETOMIDINE HYDROCHLORIDE 0.8 MCG/KG/HR: 4 INJECTION, SOLUTION INTRAVENOUS at 04:53

## 2024-04-04 RX ADMIN — Medication 10 ML: at 20:06

## 2024-04-04 RX ADMIN — CEFEPIME 2000 MG: 2 INJECTION, POWDER, FOR SOLUTION INTRAVENOUS at 04:06

## 2024-04-04 RX ADMIN — SODIUM CHLORIDE, POTASSIUM CHLORIDE, SODIUM LACTATE AND CALCIUM CHLORIDE 75 ML/HR: 600; 310; 30; 20 INJECTION, SOLUTION INTRAVENOUS at 13:46

## 2024-04-04 RX ADMIN — Medication 1500 MG: at 12:05

## 2024-04-04 RX ADMIN — PANTOPRAZOLE SODIUM 40 MG: 40 INJECTION, POWDER, FOR SOLUTION INTRAVENOUS at 05:00

## 2024-04-04 RX ADMIN — Medication 10 ML: at 08:21

## 2024-04-04 RX ADMIN — SENNOSIDES AND DOCUSATE SODIUM 2 TABLET: 8.6; 5 TABLET ORAL at 08:21

## 2024-04-04 RX ADMIN — CEFEPIME 2000 MG: 2 INJECTION, POWDER, FOR SOLUTION INTRAVENOUS at 12:05

## 2024-04-04 RX ADMIN — IPRATROPIUM BROMIDE AND ALBUTEROL SULFATE 3 ML: 2.5; .5 SOLUTION RESPIRATORY (INHALATION) at 19:19

## 2024-04-04 RX ADMIN — MIDAZOLAM HYDROCHLORIDE 2 MG: 1 INJECTION, SOLUTION INTRAMUSCULAR; INTRAVENOUS at 01:27

## 2024-04-04 RX ADMIN — ACETAMINOPHEN 650 MG: 650 SOLUTION ORAL at 22:01

## 2024-04-04 RX ADMIN — FENTANYL CITRATE 25 MCG: 50 INJECTION, SOLUTION INTRAMUSCULAR; INTRAVENOUS at 01:28

## 2024-04-04 RX ADMIN — IPRATROPIUM BROMIDE AND ALBUTEROL SULFATE 3 ML: 2.5; .5 SOLUTION RESPIRATORY (INHALATION) at 15:47

## 2024-04-04 RX ADMIN — IPRATROPIUM BROMIDE AND ALBUTEROL SULFATE 3 ML: 2.5; .5 SOLUTION RESPIRATORY (INHALATION) at 12:32

## 2024-04-04 RX ADMIN — IPRATROPIUM BROMIDE AND ALBUTEROL SULFATE 3 ML: 2.5; .5 SOLUTION RESPIRATORY (INHALATION) at 08:26

## 2024-04-04 NOTE — THERAPY EVALUATION
Acute Care - Speech Language Pathology   Swallow Initial Evaluation [facility]  Clinical Swallow Evaluation      Patient Name: Arcelia Quintero  : 1970  MRN: [id number]  Today's Date: 2024               Admit Date: 2024    Visit Dx:     ICD-10-CM ICD-9-CM   1. Acute respiratory failure with hypoxia  J96.01 518.81   2. Altered mental status, unspecified altered mental status type  R41.82 780.97   3. Hypertensive emergency  I16.1 401.9   4. History of psychogenic nonepileptic seizure  Z87.898 V13.89   5. 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    COPD (chronic obstructive pulmonary disease)    Cervical high risk HPV (human papillomavirus) test positive    Abnormal Pap smear of cervix    PTSD (post-traumatic stress disorder)    H/O PNES    AMS (altered mental status)    Hypertensive emergency    Hypokalemia    History of stroke    CKD (chronic kidney disease), stage III    Former smoker    Psychogenic nonepileptic seizure    Abnormal nuclear stress test    Other chest pain    Encephalopathy acute    Acute respiratory failure with hypercapnia     Past Medical History:   Diagnosis Date    Abnormal Pap smear of cervix     Allergic rhinitis     Asthma     Atypical chest pain     Brain tumor     Status post brain tumor resection in .    Candidiasis of mouth     Cervical high risk HPV (human papillomavirus) test positive 2020    CKD (chronic kidney disease), stage III     Colon  polyps 09/09/2020    sessile and tubular adenoma    Conversion disorder     COPD (chronic obstructive pulmonary disease)     COVID-19     Elevated liver enzymes     Former smoker 07/05/2023    H/O Helicobacter infection      Status post EGD 9/4/2012, pathology revealed H. pylori infection.    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     Neurology evaluation thought it was numbness from carpal tunnel and not Guillain-[place]    Hyperlipidemia     Hypertension     Influenza     Internal hemorrhoids     Low grade squamous intraepith lesion on cytologic smear cervix (lgsil) 09/01/2020    Median neuropathy     Migraine     PTSD (post-traumatic stress disorder)     Seizures     2 seizures after surgery    Small fiber neuropathy     Stroke     Tinea corporis      Past Surgical History:   Procedure Laterality Date    BRAIN SURGERY      status post brain tumor resection    CARDIAC CATHETERIZATION N/A 12/27/2023    Procedure: Left Heart Cath;  Surgeon: Ryder Delarosa III, MD;  Location: [facility] CATH INVASIVE LOCATION;  Service: Cardiovascular;  Laterality: N/A;    COLONOSCOPY      polyp removal    LIVER BIOPSY      UPPER GASTROINTESTINAL ENDOSCOPY      Status post EGD 9/4/2012, pathology revealed H. pylori infection.       SLP Recommendation and Plan  SLP Swallowing Diagnosis: suspected pharyngeal dysphagia (04/04/24 1430)  SLP Diet Recommendation: NPO, temporary alternate methods of nutrition/hydration, other (see comments) (few ice chips/hr as tolerated, w/ supervision) (04/04/24 1430)     SLP Rec. for Method of Medication Administration: meds via alternate route (04/04/24 1430)     Monitor for Signs of Aspiration: notify SLP if any concerns (04/04/24 1430)  Recommended Diagnostics: FEES, other (see comments) (4/5) (04/04/24 1430)  Swallow Criteria for Skilled Therapeutic Interventions Met: demonstrates skilled criteria (04/04/24 1430)  Anticipated Discharge Disposition (SLP):  inpatient rehabilitation facility (04/04/24 1430)  Rehab Potential/Prognosis, Swallowing: good, to achieve stated therapy goals (04/04/24 1430)     Predicted Duration Therapy Intervention (Days): until discharge (04/04/24 1430)  Oral Care Recommendations: Oral Care BID/PRN, Suction toothbrush (04/04/24 1430)                                        Plan of Care Reviewed With: patient, family      SWALLOW EVALUATION (Last 72 Hours)       SLP Adult Swallow Evaluation       Row Name 04/04/24 1430                   Rehab Evaluation    Document Type evaluation  -MP        Subjective Information no complaints  -MP        Patient Observations alert;cooperative  -MP        Patient/Family/Caregiver Comments/Observations Dtr present  -MP        Patient Effort good  -MP           General Information    Patient Profile Reviewed yes  -MP        Pertinent History Of Current Problem Adm 4/2 w/ acute encephalopathy, found unresponsive. Intubated 4/2-4/4. MRI w/ no acute. CXR w/ no acute. Hx tobacco abuse, emphysema/COPD, chronic migraines, PNES, conversion d/o, ? brain tumor, HTN, CKD3, GERD, dyslipidemia.  -MP        Current Method of Nutrition NPO;large-bore  -MP        Precautions/Limitations, Vision WFL  -MP        Precautions/Limitations, Hearing WFL  -MP        Prior Level of Function-Communication WFL  -MP        Prior Level of Function-Swallowing no diet consistency restrictions  -MP        Plans/Goals Discussed with patient;family;agreed upon  -MP        Barriers to Rehab medically complex  -MP        Patient's Goals for Discharge patient did not state  -MP        Family Goals for Discharge family did not state  -MP           Pain    Additional Documentation Pain Scale: FACES Pre/Post-Treatment (Group)  -MP           Pain Scale: FACES Pre/Post-Treatment    Pain: FACES Scale, Pretreatment 0-->no hurt  -MP        Posttreatment Pain Rating 0-->no hurt  -MP           Oral Motor Structure and Function    Dentition Assessment  edentulous  -MP           Oral Musculature and Cranial Nerve Assessment    Oral Motor General Assessment generalized oral motor weakness;vocal impairment  -MP        Vocal Impairment, Detail. Cranial Nerve X (Vagus) vocal quality abnormality (see comments)  -MP        Oral Motor, Comment hoarse  -MP           General Eating/Swallowing Observations    Respiratory Support Currently in Use nasal cannula  -MP        Eating/Swallowing Skills fed by SLP  -MP        Positioning During Eating upright in bed  -MP        Utensils Used spoon;cup  -MP        Consistencies Trialed ice chips;thin liquids;pureed  -MP           Clinical Swallow Eval    Pharyngeal Phase suspected pharyngeal impairment  -MP        Clinical Swallow Evaluation Summary Throat clear/cough w/ thin, throat clear & multiple swallows w/ puree. Rec NPO/alt route, few ice chips/hr, and FEES tomorrow to further assess.  -MP           Pharyngeal Phase Concerns    Pharyngeal Phase Concerns multiple swallows;cough;throat clear  -MP        Multiple Swallows pudding  -MP        Cough thin  -MP        Throat Clear thin;pudding  -MP           SLP Evaluation Clinical Impression    SLP Swallowing Diagnosis suspected pharyngeal dysphagia  -MP        Functional Impact risk of aspiration/pneumonia  -MP        Rehab Potential/Prognosis, Swallowing good, to achieve stated therapy goals  -        Swallow Criteria for Skilled Therapeutic Interventions Met demonstrates skilled criteria  -MP           Recommendations    Predicted Duration Therapy Intervention (Days) until discharge  -MP        SLP Diet Recommendation NPO;temporary alternate methods of nutrition/hydration;other (see comments)  few ice chips/hr as tolerated, w/ supervision  -MP        Recommended Diagnostics FEES;other (see comments)  4/5  -MP        Oral Care Recommendations Oral Care BID/PRN;Suction toothbrush  -MP        SLP Rec. for Method of Medication Administration meds via alternate route  -MP         Monitor for Signs of Aspiration notify SLP if any concerns  -MP        Anticipated Discharge Disposition (SLP) inpatient rehabilitation facility  -MP                  User Key  (r) = Recorded By, (t) = Taken By, (c) = Cosigned By      Initials Name Effective Dates    Beto Collazo MS CCC-SLP 12/28/21 -                     EDUCATION  The patient has been educated in the following areas:   Dysphagia (Swallowing Impairment) NPO rationale.              Time Calculation:    Time Calculation- SLP       Row Name 04/04/24 1513             Time Calculation- SLP    SLP Start Time 1430  -MP      SLP Received On 04/04/24  -MP         Untimed Charges    [id number] Eval Oral Pharyng Swallow Minutes 40  -MP         Total Minutes    Untimed Charges Total Minutes 40  -MP       Total Minutes 40  -MP                User Key  (r) = Recorded By, (t) = Taken By, (c) = Cosigned By      Initials Name Provider Type    Beto Collazo MS CCC-SLP Speech and Language Pathologist                    Therapy Charges for Today       Code Description Service Date Service Provider Modifiers Qty    [id number] HC ST EVAL ORAL PHARYNG SWALLOW 3 4/4/2024 Beto Segundo MS CCC-SLP GN 1                 Beto Gandara MS CCC-LUCERO  4/4/2024

## 2024-04-04 NOTE — PROGRESS NOTES
INTENSIVIST   PROGRESS NOTE     Hospital:  LOS: 2 days     Ms. Arcelia Quintero, 53 y.o. female is followed for a Chief Complaint of: Altered Mental Status      Subjective   S     Interval History:  Extubated this AM. Reports that she had been taking her blood pressure medications at home. She has been eating more salt.        The patient's relevant past medical, surgical and social history were reviewed and updated in Epic as appropriate.      ROS:   Constitutional: Negative for fever.   Respiratory: Negative for dyspnea.   Cardiovascular: Negative for chest pain.   Gastrointestinal: Negative for  nausea, vomiting and diarrhea.     Objective   O     Vitals:  Temp  Min: 97.1 °F (36.2 °C)  Max: 100.9 °F (38.3 °C)  BP  Min: 94/56  Max: 164/90  Pulse  Min: 53  Max: 80  Resp  Min: 12  Max: 24  SpO2  Min: 90 %  Max: 100 % Flow (L/min)  Min: 3  Max: 4    Intake/Ouptut 24 hrs (7:00AM - 6:59 AM)  Intake & Output (last 3 days)         03/31 0701  04/01 0700 04/01 0701  04/02 0700 04/02 0701  04/03 0700 04/03 0701  04/04 0700    I.V. (mL/kg)   209 (2.9) 296.4 (4.1)    NG/GT   60     IV Piggyback    500    Total Intake(mL/kg)   269 (3.7) 796.4 (10.9)    Urine (mL/kg/hr)   400 190 (0.3)    Emesis/NG output   200 210    Total Output   600 400    Net   -331 +396.4                    Medications (drips):  dexmedetomidine, Last Rate: Stopped (04/04/24 0933)  lactated ringers, Last Rate: 75 mL/hr (04/04/24 1346)  niCARdipine  norepinephrine, Last Rate: Stopped (04/04/24 1100)  Pharmacy to dose vancomycin  phenylephrine, Last Rate: Stopped (04/03/24 2048)          Physical Examination  Telemetry:  Normal sinus rhythm.    Constitutional:  No acute distress.  Resting in bed on nasal cannula.    Eyes: No scleral icterus.   PERRL, EOM intact.    Neck:  Supple, FROM   Cardiovascular: Normal rate, regular and rhythm. Normal heart sounds.  No murmurs, gallop or rub.   Respiratory: No respiratory distress. Normal respiratory effort.  Normal  breath sounds  Clear to auscultation   Abdominal:  Soft. No masses. Nontender. No distension. No HSM.   Extremities: No digital cyanosis. No clubbing.  No peripheral edema.   Skin: No rashes, lesions or ulcers   Neurological:   Alert and interactive.              Results from last 7 days   Lab Units 04/04/24  0351 04/03/24  0432 04/02/24  1510   WBC 10*3/mm3 12.79* 12.15* 6.33   HEMOGLOBIN g/dL 13.3 15.0 15.3   MCV fL 94.9 91.1 90.1   PLATELETS 10*3/mm3 166 178 186     Results from last 7 days   Lab Units 04/04/24  0351 04/03/24  1717 04/03/24  0432   SODIUM mmol/L 132* 143 139   POTASSIUM mmol/L 4.0 4.1 4.4   CO2 mmol/L 22.0 24.0 21.0*   CREATININE mg/dL 0.74 1.05* 1.25*   GLUCOSE mg/dL 132* 122* 120*   MAGNESIUM mg/dL 2.4 1.9 2.0   PHOSPHORUS mg/dL 2.6 3.6 5.5*     Estimated Creatinine Clearance: 100.7 mL/min (by C-G formula based on SCr of 0.74 mg/dL).  Results from last 7 days   Lab Units 04/02/24  1507   ALK PHOS U/L 106   BILIRUBIN mg/dL 0.3   ALT (SGPT) U/L 11   AST (SGOT) U/L 13       Results from last 7 days   Lab Units 04/04/24  0334 04/03/24  1208 04/02/24  1635   PH, ARTERIAL pH units 7.350 7.303* 7.258*   PCO2, ARTERIAL mm Hg 41.9 47.3* 49.3*   PO2 ART mm Hg 83.0 75.7* 228.0*   FIO2 % 40 45 100       Images:  Imaging Results (Last 24 Hours)       Procedure Component Value Units Date/Time    XR Chest 1 View [[id number]] Collected: 04/04/24 0726     Updated: 04/04/24 0731    Narrative:      XR CHEST 1 VW    Date of Exam: 4/4/2024 3:18 AM EDT    Indication: intubated    Comparison 4/2/2024  Findings:  ET tube is unchanged in position. NG tube again noted although tip not included. Heart and pulmonary vessels appear within normal limits. Lung fields are well inflated and clear of acute infiltrates or effusions.      Impression:      Impression:  Unchanged support lines. No acute process identified in the lung fields.      Electronically Signed: Linda Gomez MD    4/4/2024 7:28 AM EDT    Workstation ID:  [id number]               Results: Reviewed.  I reviewed the patient's new laboratory and imaging results.  I independently reviewed the patient's new images.    Medications: Reviewed.    Assessment & Plan   A / P     Ms. Quintero is a 54yo F who presented to the [facility] ED on 4/2/24 with suspected CVA. Upon arrival, she was noted to have a SBP > 220. She was intubated in the ED for airway protection. She was admitted to the ICU on mechanical ventilation.     CVA workup including MRI brain was negative. Neurology is following and suspects malignant hypertension.     She was extubated on the morning of 4/4/24.     Nutrition:   NPO Diet NPO Type: Strict NPO  Advance Directives:   There are no questions and answers to display.       Active Hospital Problems    Diagnosis     **Encephalopathy acute     Acute respiratory failure with hypercapnia     CKD (chronic kidney disease), stage III     Hypertensive emergency     H/O PNES     Emphysema/COPD     Anxiety and depression     Dyslipidemia     GERD (gastroesophageal reflux disease)        Assessment / Plan:    Extubated this AM. Wean supplemental oxygen as tolerated.   Hold vasopressors if MAP > 65. Monitor for changes in mentation.   Continue empiric antibiotics. Follow up cultures.   Neurology following.   Noted to have T-wave inversion. Troponin elevated but then decreased. Will repeat an echocardiogram and continue to monitor. Negative LHC in December 2023. Will continue to monitor.   AM labs    High risk secondary to management of mechanical ventilation and titration of vasopressors.      High level of risk due to:  severe exacerbation of chronic illness and illness with threat to life or bodily function.    Plan of care and goals reviewed during interdisciplinary rounds.  I discussed the patient's findings and my recommendations with patient, nursing staff, and consulting provider        Jenae Garcia DO    Intensive Care Medicine and Pulmonary Medicine

## 2024-04-04 NOTE — PROGRESS NOTES
Neurology       Patient Care Team:  Coby Ely PA-C as PCP - General (Physician Assistant)  Carolina Tello MD as Consulting Physician (Neurology)  Elliott Hunt MD as Consulting Physician (Gastroenterology)  Ryder Delarosa III, MD as Cardiologist (Cardiology)  Mary Canales APRN as Nurse Practitioner (Psychiatry)    Chief complaint: Loss of consciousness    History: Patient is been extubated.    She is awake and following complex commands.    She is still quite hoarse.  She is coughing appropriately.      Past Medical History:   Diagnosis Date    Abnormal Pap smear of cervix     Allergic rhinitis     Asthma     Atypical chest pain     Brain tumor     Status post brain tumor resection in 1989.    Candidiasis of mouth     Cervical high risk HPV (human papillomavirus) test positive 09/01/2020    CKD (chronic kidney disease), stage III     Colon polyps 09/09/2020    sessile and tubular adenoma    Conversion disorder     COPD (chronic obstructive pulmonary disease)     COVID-19     Elevated liver enzymes     Former smoker 07/05/2023    H/O Helicobacter infection      Status post EGD 9/4/2012, pathology revealed H. pylori infection.    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     Neurology evaluation thought it was numbness from carpal tunnel and not Guillain-[place]    Hyperlipidemia     Hypertension     Influenza     Internal hemorrhoids     Low grade squamous intraepith lesion on cytologic smear cervix (lgsil) 09/01/2020    Median neuropathy     Migraine     PTSD (post-traumatic stress disorder)     Seizures     2 seizures after surgery    Small fiber neuropathy     Stroke     Tinea corporis        Vital Signs   Vitals:    04/04/24 1015 04/04/24 1030 04/04/24 1045 04/04/24 1100   BP: 102/59 106/57 150/81 133/75   BP Location:       Patient Position:       Pulse: 62 63 57 64   Resp:       Temp:       TempSrc:       SpO2: 90% 91% 95% 93%    Weight:       Height:           Physical Exam:   General: Awake and alert              Neuro: Oriented to self.    She can tell me her name and follow complex commands.    Speech indicates some hoarseness but content seems good.    She is not rambling.    Coordination shows normal fine finger movements.    Cranial nerves show 2 mm reactive pupils.  Eyes are conjugate.    She has no facial numbness or weakness.    Reflexes are 1+ and equal.    Motor testing shows equal  no pronator drift.    He moves all extremities on command.            Results Review:  Chest x-ray showed no acute changes    EKG is normal sinus rhythm.  Evidence of an anterior infarct with marked T wave abnormalities considered for inferolateral ischemia    Opponent was 69 yesterday    EEG was normal.    Results from last 7 days   Lab Units 04/04/24  0351 04/03/24  1717 04/03/24  0432 04/02/24  1507   SODIUM mmol/L 132* 143 139 136   POTASSIUM mmol/L 4.0 4.1 4.4 3.8   CHLORIDE mmol/L 103 110* 105 104   CO2 mmol/L 22.0 24.0 21.0* 23.0   BUN mg/dL 24* 24* 20 15   CREATININE mg/dL 0.74 1.05* 1.25* 0.82   CALCIUM mg/dL 8.6 8.6 8.8 9.3   BILIRUBIN mg/dL  --   --   --  0.3   ALK PHOS U/L  --   --   --  106   ALT (SGPT) U/L  --   --   --  11   AST (SGOT) U/L  --   --   --  13   GLUCOSE mg/dL 132* 122* 120* 104*       Imaging Results (Last 24 Hours)       Procedure Component Value Units Date/Time    XR Chest 1 View [[id number]] Collected: 04/04/24 0726     Updated: 04/04/24 0731    Narrative:      XR CHEST 1 VW    Date of Exam: 4/4/2024 3:18 AM EDT    Indication: intubated    Comparison 4/2/2024  Findings:  ET tube is unchanged in position. NG tube again noted although tip not included. Heart and pulmonary vessels appear within normal limits. Lung fields are well inflated and clear of acute infiltrates or effusions.      Impression:      Impression:  Unchanged support lines. No acute process identified in the lung fields.      Electronically Signed:  Linda Gomez MD    4/4/2024 7:28 AM EDT    Workstation ID: [id number]            Assessment:  Hypertensive encephalopathy, resolved    EKG and troponin abnormalities,?  Acute MI        Plan:  PT and OT are already ordered.    Cardiology evaluation        Comment:  Patient does not appear to have significant neurologic abnormalities at this point         I discussed the patients findings and my recommendations with patient and primary care team    Fredy Montague MD  04/04/24  11:49 EDT

## 2024-04-04 NOTE — PLAN OF CARE
Goal Outcome Evaluation:  Plan of Care Reviewed With: patient, family                  Anticipated Discharge Disposition (SLP): inpatient rehabilitation facility          SLP Swallowing Diagnosis: suspected pharyngeal dysphagia (04/04/24 7026)

## 2024-04-05 ENCOUNTER — ANCILLARY PROCEDURE (OUTPATIENT)
Dept: SPEECH THERAPY | Facility: HOSPITAL | Age: 54
DRG: 077 | End: 2024-04-05
Payer: MEDICARE

## 2024-04-05 LAB
ANION GAP SERPL CALCULATED.3IONS-SCNC: 6 MMOL/L (ref 5–15)
BUN SERPL-MCNC: 25 MG/DL (ref 6–20)
BUN/CREAT SERPL: 30.9 (ref 7–25)
CALCIUM SPEC-SCNC: 8.3 MG/DL (ref 8.6–10.5)
CHLORIDE SERPL-SCNC: 111 MMOL/L (ref 98–107)
CO2 SERPL-SCNC: 24 MMOL/L (ref 22–29)
CREAT SERPL-MCNC: 0.81 MG/DL (ref 0.57–1)
DEPRECATED RDW RBC AUTO: 46 FL (ref 37–54)
EGFRCR SERPLBLD CKD-EPI 2021: 87.5 ML/MIN/1.73
ERYTHROCYTE [DISTWIDTH] IN BLOOD BY AUTOMATED COUNT: 13.2 % (ref 12.3–15.4)
GLUCOSE BLDC GLUCOMTR-MCNC: 91 MG/DL (ref 70–130)
GLUCOSE BLDC GLUCOMTR-MCNC: 93 MG/DL (ref 70–130)
GLUCOSE SERPL-MCNC: 83 MG/DL (ref 65–99)
HCT VFR BLD AUTO: 35.1 % (ref 34–46.6)
HGB BLD-MCNC: 11.4 G/DL (ref 12–15.9)
MAGNESIUM SERPL-MCNC: 2 MG/DL (ref 1.6–2.6)
MCH RBC QN AUTO: 30.8 PG (ref 26.6–33)
MCHC RBC AUTO-ENTMCNC: 32.5 G/DL (ref 31.5–35.7)
MCV RBC AUTO: 94.9 FL (ref 79–97)
MRSA DNA SPEC QL NAA+PROBE: NEGATIVE
PHOSPHATE SERPL-MCNC: 2.5 MG/DL (ref 2.5–4.5)
PLATELET # BLD AUTO: 116 10*3/MM3 (ref 140–450)
PMV BLD AUTO: 11.9 FL (ref 6–12)
POTASSIUM SERPL-SCNC: 3.9 MMOL/L (ref 3.5–5.2)
QT INTERVAL: 606 MS
QTC INTERVAL: 574 MS
RBC # BLD AUTO: 3.7 10*6/MM3 (ref 3.77–5.28)
SODIUM SERPL-SCNC: 141 MMOL/L (ref 136–145)
VANCOMYCIN SERPL-MCNC: 6.3 MCG/ML (ref 5–40)
WBC NRBC COR # BLD AUTO: 7.08 10*3/MM3 (ref 3.4–10.8)

## 2024-04-05 PROCEDURE — 94664 DEMO&/EVAL PT USE INHALER: CPT

## 2024-04-05 PROCEDURE — 97162 PT EVAL MOD COMPLEX 30 MIN: CPT

## 2024-04-05 PROCEDURE — 80202 ASSAY OF VANCOMYCIN: CPT

## 2024-04-05 PROCEDURE — 25010000002 CEFEPIME PER 500 MG: Performed by: INTERNAL MEDICINE

## 2024-04-05 PROCEDURE — 97166 OT EVAL MOD COMPLEX 45 MIN: CPT

## 2024-04-05 PROCEDURE — 94761 N-INVAS EAR/PLS OXIMETRY MLT: CPT

## 2024-04-05 PROCEDURE — 85027 COMPLETE CBC AUTOMATED: CPT | Performed by: INTERNAL MEDICINE

## 2024-04-05 PROCEDURE — 80048 BASIC METABOLIC PNL TOTAL CA: CPT | Performed by: INTERNAL MEDICINE

## 2024-04-05 PROCEDURE — 99232 SBSQ HOSP IP/OBS MODERATE 35: CPT | Performed by: PSYCHIATRY & NEUROLOGY

## 2024-04-05 PROCEDURE — 92610 EVALUATE SWALLOWING FUNCTION: CPT

## 2024-04-05 PROCEDURE — 87641 MR-STAPH DNA AMP PROBE: CPT | Performed by: INTERNAL MEDICINE

## 2024-04-05 PROCEDURE — 82948 REAGENT STRIP/BLOOD GLUCOSE: CPT

## 2024-04-05 PROCEDURE — 94799 UNLISTED PULMONARY SVC/PX: CPT

## 2024-04-05 PROCEDURE — 25010000002 VANCOMYCIN HCL IN NACL 1.5-0.9 GM/500ML-% SOLUTION

## 2024-04-05 PROCEDURE — 84100 ASSAY OF PHOSPHORUS: CPT | Performed by: INTERNAL MEDICINE

## 2024-04-05 PROCEDURE — 99232 SBSQ HOSP IP/OBS MODERATE 35: CPT | Performed by: INTERNAL MEDICINE

## 2024-04-05 PROCEDURE — 83735 ASSAY OF MAGNESIUM: CPT | Performed by: INTERNAL MEDICINE

## 2024-04-05 RX ORDER — AMOXICILLIN AND CLAVULANATE POTASSIUM 875; 125 MG/1; MG/1
1 TABLET, FILM COATED ORAL EVERY 12 HOURS SCHEDULED
Status: DISCONTINUED | OUTPATIENT
Start: 2024-04-05 | End: 2024-04-08 | Stop reason: HOSPADM

## 2024-04-05 RX ORDER — CARVEDILOL 12.5 MG/1
12.5 TABLET ORAL EVERY 12 HOURS SCHEDULED
Status: DISCONTINUED | OUTPATIENT
Start: 2024-04-05 | End: 2024-04-08 | Stop reason: HOSPADM

## 2024-04-05 RX ORDER — VANCOMYCIN/0.9 % SOD CHLORIDE 1.5G/250ML
1500 PLASTIC BAG, INJECTION (ML) INTRAVENOUS EVERY 12 HOURS
Status: DISCONTINUED | OUTPATIENT
Start: 2024-04-05 | End: 2024-04-05

## 2024-04-05 RX ADMIN — IPRATROPIUM BROMIDE AND ALBUTEROL SULFATE 3 ML: 2.5; .5 SOLUTION RESPIRATORY (INHALATION) at 20:07

## 2024-04-05 RX ADMIN — IPRATROPIUM BROMIDE AND ALBUTEROL SULFATE 3 ML: 2.5; .5 SOLUTION RESPIRATORY (INHALATION) at 13:05

## 2024-04-05 RX ADMIN — IPRATROPIUM BROMIDE AND ALBUTEROL SULFATE 3 ML: 2.5; .5 SOLUTION RESPIRATORY (INHALATION) at 08:10

## 2024-04-05 RX ADMIN — IPRATROPIUM BROMIDE AND ALBUTEROL SULFATE 3 ML: 2.5; .5 SOLUTION RESPIRATORY (INHALATION) at 16:31

## 2024-04-05 RX ADMIN — CARVEDILOL 12.5 MG: 12.5 TABLET, FILM COATED ORAL at 20:11

## 2024-04-05 RX ADMIN — CEFEPIME 2000 MG: 2 INJECTION, POWDER, FOR SOLUTION INTRAVENOUS at 03:48

## 2024-04-05 RX ADMIN — CARVEDILOL 12.5 MG: 12.5 TABLET, FILM COATED ORAL at 14:41

## 2024-04-05 RX ADMIN — AMOXICILLIN AND CLAVULANATE POTASSIUM 1 TABLET: 875; 125 TABLET, FILM COATED ORAL at 20:11

## 2024-04-05 RX ADMIN — PANTOPRAZOLE SODIUM 40 MG: 40 INJECTION, POWDER, FOR SOLUTION INTRAVENOUS at 06:16

## 2024-04-05 RX ADMIN — Medication 10 ML: at 09:51

## 2024-04-05 RX ADMIN — SENNOSIDES AND DOCUSATE SODIUM 2 TABLET: 8.6; 5 TABLET ORAL at 09:50

## 2024-04-05 RX ADMIN — AMOXICILLIN AND CLAVULANATE POTASSIUM 1 TABLET: 875; 125 TABLET, FILM COATED ORAL at 11:53

## 2024-04-05 RX ADMIN — Medication 1500 MG: at 09:50

## 2024-04-05 RX ADMIN — Medication 10 ML: at 20:12

## 2024-04-05 NOTE — PLAN OF CARE
Goal Outcome Evaluation:  Plan of Care Reviewed With: patient        Progress: no change  Outcome Evaluation: OT evaluation completed. The pt presents below her functional baseline with generalized weakness, decreased activity tolerance, balance deficits, and decreased safety awareness. The pt completed LBD and hair care with set up and SBA while sitting in chair. The pt ambulated <household distance using a RWx with Bryce for safety. The pt will benefit from continued IP OT services to increase the pt's safety and independence during ADLs and functional mobility. Recommend a d/c to IRF for best outcome.      Anticipated Discharge Disposition (OT): inpatient rehabilitation facility

## 2024-04-05 NOTE — CASE MANAGEMENT/SOCIAL WORK
Continued Stay Note  [facility]     Patient Name: Arcelia Quintero  MRN: [id number]  Today's Date: 4/5/2024    Admit Date: 4/2/2024    Plan: IRF   Discharge Plan       Row Name 04/05/24 1515       Plan    Plan IRF    Plan Comments Discussed patient in MDR and spoke with patient's daughter, Monalisa, by phone to discuss disposition.  Patient sleeping at time of CM visit.  Therapy is recommending IRF and Monalisa states patient will be agreeable to rehab at [facility].   Referral given to Lynn with [facility].  CM will continue to follow and assist with discharge plan.                   Discharge Codes    No documentation.                   Pippa Ontiveros RN

## 2024-04-05 NOTE — PLAN OF CARE
Goal Outcome Evaluation:  Plan of Care Reviewed With: patient           Outcome Evaluation: PT evaluation completed. Pt presenting below baseline level of mobility with generalized weakness, impaired balance, limited activity tolerance, and impaired safety awareness warranting IP PT services to address deficits and facilitate return to PLOF. Recommend IRF following d/c.      Anticipated Discharge Disposition (PT): inpatient rehabilitation facility

## 2024-04-05 NOTE — THERAPY EVALUATION
Patient Name: Arcelia Quintero  : 1970    MRN: [id number]                              Today's Date: 2024       Admit Date: 2024    Visit Dx:     ICD-10-CM ICD-9-CM   1. Acute respiratory failure with hypoxia  J96.01 518.81   2. Altered mental status, unspecified altered mental status type  R41.82 780.97   3. Hypertensive emergency  I16.1 401.9   4. History of psychogenic nonepileptic seizure  Z87.898 V13.89   5. Dysphagia, unspecified type  R13.10 787.20     Patient Active Problem List   Diagnosis    Anxiety and depression    Chronic idiopathic constipation    Dyslipidemia    Emphysema/COPD    Primary hypertension    GERD (gastroesophageal reflux disease)    Irritable bowel syndrome    Migraine    H/O Seizures on Keppra    Carpal tunnel syndrome    Internal hemorrhoids    Hemoptysis    Atypical chest pain    Agoraphobia    Genital ulcer, female    Tobacco use    Bilateral leg numbness    Low back pain    History of Guillain-[place] syndrome due to influenza immunization    COPD (chronic obstructive pulmonary disease)    Cervical high risk HPV (human papillomavirus) test positive    Abnormal Pap smear of cervix    PTSD (post-traumatic stress disorder)    H/O PNES    AMS (altered mental status)    Hypertensive emergency    Hypokalemia    History of stroke    CKD (chronic kidney disease), stage III    Former smoker    Psychogenic nonepileptic seizure    Abnormal nuclear stress test    Other chest pain    Encephalopathy acute    Acute respiratory failure with hypercapnia     Past Medical History:   Diagnosis Date    Abnormal Pap smear of cervix     Allergic rhinitis     Asthma     Atypical chest pain     Brain tumor     Status post brain tumor resection in .    Candidiasis of mouth     Cervical high risk HPV (human papillomavirus) test positive 2020    CKD (chronic kidney disease), stage III     Colon polyps 2020    sessile and tubular adenoma    Conversion disorder     COPD (chronic  obstructive pulmonary disease)     COVID-19     Elevated liver enzymes     Former smoker 07/05/2023    H/O Helicobacter infection      Status post EGD 9/4/2012, pathology revealed H. pylori infection.    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     Neurology evaluation thought it was numbness from carpal tunnel and not Guillain-Naco    Hyperlipidemia     Hypertension     Influenza     Internal hemorrhoids     Low grade squamous intraepith lesion on cytologic smear cervix (lgsil) 09/01/2020    Median neuropathy     Migraine     PTSD (post-traumatic stress disorder)     Seizures     2 seizures after surgery    Small fiber neuropathy     Stroke     Tinea corporis      Past Surgical History:   Procedure Laterality Date    BRAIN SURGERY      status post brain tumor resection    CARDIAC CATHETERIZATION N/A 12/27/2023    Procedure: Left Heart Cath;  Surgeon: Ryder Delarosa III, MD;  Location: [facility] INVASIVE LOCATION;  Service: Cardiovascular;  Laterality: N/A;    COLONOSCOPY      polyp removal    LIVER BIOPSY      UPPER GASTROINTESTINAL ENDOSCOPY      Status post EGD 9/4/2012, pathology revealed H. pylori infection.      General Information       Row Name 04/05/24 1304          OT Time and Intention    Document Type evaluation  -KF     Mode of Treatment occupational therapy  -KF       Row Name 04/05/24 1304          General Information    Patient Profile Reviewed yes  -KF     Prior Level of Function independent:;all household mobility;bed mobility;ADL's;mod assist:;home management;dependent:;driving  Independent prior, no AD. Denies history of falls.  -KF     Existing Precautions/Restrictions fall;cardiac;oxygen therapy device and L/min  Extubated (4/4)  -KF     Barriers to Rehab medically complex;previous functional deficit  -KF       Row Name 04/05/24 1303          Living Environment    People in Home child(collin), adult  -KF       Row Name 04/05/24 130          Home  Main Entrance    Number of Stairs, Main Entrance two  -KF     Stair Railings, Main Entrance none  -       Row Name 04/05/24 1305          Stairs Within Home, Primary    Stairs, Within Home, Primary Pt states she has a shower bench available, however has not been using it.  -       Row Name 04/05/24 1305          Cognition    Orientation Status (Cognition) oriented x 3  -       Row Name 04/05/24 1305          Safety Issues, Functional Mobility    Safety Issues Affecting Function (Mobility) awareness of need for assistance;insight into deficits/self-awareness;positioning of assistive device;safety precaution awareness;safety precautions follow-through/compliance;sequencing abilities  -     Impairments Affecting Function (Mobility) balance;endurance/activity tolerance;shortness of breath;strength  -KF               User Key  (r) = Recorded By, (t) = Taken By, (c) = Cosigned By      Initials Name Provider Type    KF Eloisa Hodges OT Occupational Therapist                     Mobility/ADL's       Row Name 04/05/24 1308          Bed Mobility    Comment, (Bed Mobility) Pt found and left up in the chair.  -       Row Name 04/05/24 1308          Transfers    Transfers sit-stand transfer;stand-sit transfer  -     Comment, (Transfers) Verbal cues for hand placement prior to standing/sitting  -       Row Name 04/05/24 1308          Sit-Stand Transfer    Sit-Stand [place] (Transfers) minimum assist (75% patient effort);1 person assist;verbal cues  -     Assistive Device (Sit-Stand Transfers) walker, front-wheeled  -       Row Name 04/05/24 1308          Stand-Sit Transfer    Stand-Sit [place] (Transfers) minimum assist (75% patient effort);1 person assist;verbal cues  -     Assistive Device (Stand-Sit Transfers) walker, front-wheeled  -       Row Name 04/05/24 1308          Functional Mobility    Functional Mobility- Ind. Level minimum assist (75% patient effort);1 person  -     Functional  Mobility- Device walker, front-wheeled  -     Functional Mobility- Comment Pt ambulated <household distance using a RWx with Bryce, taking one short standing rest break due to feeling SOA.  Pt's SpO2 monitored throughout mobility, however sensory had difficulty reading. It appeared pt's SpO2 dropped to 88% at the lowest on 4.5L, recovering within 30 seconds with standing rest.  -       Row Name 04/05/24 1308          Activities of Daily Living    BADL Assessment/Intervention lower body dressing;grooming  -       Row Name 04/05/24 1308          Lower Body Dressing Assessment/Training    [place] Level (Lower Body Dressing) doff;don;socks;standby assist  -     Position (Lower Body Dressing) supported sitting  -       Row Name 04/05/24 1308          Grooming Assessment/Training    [place] Level (Grooming) wash face, hands;hair care, combing/brushing;standby assist  -     Position (Grooming) supported sitting  -               User Key  (r) = Recorded By, (t) = Taken By, (c) = Cosigned By      Initials Name Provider Type    KF Eloisa Hodges OT Occupational Therapist                   Obj/Interventions       Row Name 04/05/24 1310          Sensory Assessment (Somatosensory)    Sensory Assessment (Somatosensory) UE sensation intact  -[facility] [place] Name 04/05/24 1310          Vision Assessment/Intervention    Visual Impairment/Limitations WFL  -[facility] [place] Name 04/05/24 1310          Range of Motion Comprehensive    General Range of Motion bilateral upper extremity ROM WFL  -[facility] [place] Name 04/05/24 1310          Strength Comprehensive (MMT)    General Manual Muscle Testing (MMT) Assessment upper extremity strength deficits identified  -     Comment, General Manual Muscle Testing (MMT) Assessment BUE grossly 4/5  -       Row Name 04/05/24 1310          Balance    Balance Assessment sitting static balance;sitting dynamic balance;sit to stand dynamic balance;standing static balance;standing  dynamic balance  -KF     Static Sitting Balance standby assist  -KF     Dynamic Sitting Balance contact guard  -KF     Position, Sitting Balance unsupported;sitting in chair  -KF     Sit to Stand Dynamic Balance minimal assist;1-person assist  -KF     Static Standing Balance contact guard  -KF     Dynamic Standing Balance minimal assist;1-person assist  -KF     Position/Device Used, Standing Balance supported;walker, front-wheeled  -KF     Balance Interventions sitting;standing;sit to stand;supported;static;dynamic;occupation based/functional task  -KF               User Key  (r) = Recorded By, (t) = Taken By, (c) = Cosigned By      Initials Name Provider Type    KF Eloisa Hodges, OT Occupational Therapist                   Goals/Plan       Row Name 04/05/24 1315          Transfer Goal 1 (OT)    Activity/Assistive Device (Transfer Goal 1, OT) commode;bed-to-chair/chair-to-bed  -KF     [place] Level/Cues Needed (Transfer Goal 1, OT) supervision required  -KF     Time Frame (Transfer Goal 1, OT) long term goal (LTG);10 days  -KF     Progress/Outcome (Transfer Goal 1, OT) goal ongoing  -KF       Row Name 04/05/24 1315          Dressing Goal 1 (OT)    Activity/Device (Dressing Goal 1, OT) upper body dressing;lower body dressing  -KF     [place]/Cues Needed (Dressing Goal 1, OT) supervision required  -KF     Time Frame (Dressing Goal 1, OT) long term goal (LTG);10 days  -KF     Progress/Outcome (Dressing Goal 1, OT) goal ongoing  -KF       Row Name 04/05/24 1315          Grooming Goal 1 (OT)    Activity/Device (Grooming Goal 1, OT) grooming skills, all;other (see comments)  sink side  -KF     [place] (Grooming Goal 1, OT) supervision required  -KF     Time Frame (Grooming Goal 1, OT) long term goal (LTG);10 days  -KF     Progress/Outcome (Grooming Goal 1, OT) goal ongoing  -KF       Row Name 04/05/24 1315          Therapy Assessment/Plan (OT)    Planned Therapy Interventions (OT) activity tolerance  training;adaptive equipment training;BADL retraining;functional balance retraining;IADL retraining;occupation/activity based interventions;patient/caregiver education/training;ROM/therapeutic exercise;strengthening exercise;transfer/mobility retraining  -               User Key  (r) = Recorded By, (t) = Taken By, (c) = Cosigned By      Initials Name Provider Type    KF Eloisa Hodges OT Occupational Therapist                   Clinical Impression       Row Name 04/05/24 1312          Pain Assessment    Pretreatment Pain Rating 0/10 - no pain  -KF     Posttreatment Pain Rating 0/10 - no pain  -KF     Pain Intervention(s) Repositioned;Ambulation/increased activity  -       Row Name 04/05/24 1312          Plan of Care Review    Plan of Care Reviewed With patient  -KF     Progress no change  -KF     Outcome Evaluation OT evaluation completed. The pt presents below her functional baseline with generalized weakness, decreased activity tolerance, balance deficits, and decreased safety awareness. The pt completed LBD and hair care with set up and SBA while sitting in chair. The pt ambulated <household distance using a RWx with Bryce for safety. The pt will benefit from continued IP OT services to increase the pt's safety and independence during ADLs and functional mobility. Recommend a d/c to IRF for best outcome.  -       Row Name 04/05/24 1312          Therapy Assessment/Plan (OT)    Rehab Potential (OT) good, to achieve stated therapy goals  -     Criteria for Skilled Therapeutic Interventions Met (OT) yes;skilled treatment is necessary  -     Therapy Frequency (OT) daily  -       Row Name 04/05/24 1312          Therapy Plan Review/Discharge Plan (OT)    Anticipated Discharge Disposition (OT) inpatient rehabilitation facility  -       Row Name 04/05/24 1312          Vital Signs    Pre Systolic BP Rehab 166  -KF     Pre Treatment Diastolic BP 98  -KF     Post Systolic BP Rehab 170  -KF     Post Treatment  Diastolic   -KF     Pretreatment Heart Rate (beats/min) 78  -KF     Posttreatment Heart Rate (beats/min) 91  -KF     Pre SpO2 (%) 91  4.5L  -KF     O2 Delivery Pre Treatment nasal cannula  -KF     Post SpO2 (%) 92  -KF     O2 Delivery Post Treatment nasal cannula  -KF     Pre Patient Position Sitting  -KF     Intra Patient Position Standing  -KF     Post Patient Position Sitting  -KF       Row Name 04/05/24 1312          Positioning and Restraints    Pre-Treatment Position sitting in chair/recliner  -KF     Post Treatment Position chair  -KF     In Chair notified nsg;reclined;call light within reach;encouraged to call for assist;exit alarm on;waffle cushion;legs elevated  -KF               User Key  (r) = Recorded By, (t) = Taken By, (c) = Cosigned By      Initials Name Provider Type    Eloisa Ruggiero OT Occupational Therapist                   Outcome Measures       Row Name 04/05/24 1315          How much help from another is currently needed...    Putting on and taking off regular lower body clothing? 3  -KF     Bathing (including washing, rinsing, and drying) 3  -KF     Toileting (which includes using toilet bed pan or urinal) 3  -KF     Putting on and taking off regular upper body clothing 3  -KF     Taking care of personal grooming (such as brushing teeth) 3  -KF     Eating meals 4  -KF     AM-PAC 6 Clicks Score (OT) 19  -KF       Row Name 04/05/24 1315          Functional Assessment    Outcome Measure Options AM-PAC 6 Clicks Daily Activity (OT)  -KF               User Key  (r) = Recorded By, (t) = Taken By, (c) = Cosigned By      Initials Name Provider Type    Eloisa Ruggiero OT Occupational Therapist                    Occupational Therapy Education       Title: PT OT SLP Therapies (In Progress)       Topic: Occupational Therapy (In Progress)       Point: ADL training (Done)       Description:   Instruct learner(s) on proper safety adaptation and remediation techniques during self care or  transfers.   Instruct in proper use of assistive devices.                  Learning Progress Summary             Patient Acceptance, E,TB, VU,DU by  at 4/5/2024 1011                         Point: Home exercise program (Not Started)       Description:   Instruct learner(s) on appropriate technique for monitoring, assisting and/or progressing therapeutic exercises/activities.                  Learner Progress:  Not documented in this visit.              Point: Precautions (Done)       Description:   Instruct learner(s) on prescribed precautions during self-care and functional transfers.                  Learning Progress Summary             Patient Acceptance, E,TB, VU,DU by  at 4/5/2024 1011                         Point: Body mechanics (Done)       Description:   Instruct learner(s) on proper positioning and spine alignment during self-care, functional mobility activities and/or exercises.                  Learning Progress Summary             Patient Acceptance, E,TB, VU,DU by  at 4/5/2024 1011                                         User Key       Initials Effective Dates Name Provider Type Discipline     08/09/23 -  Eloisa Hodges, YUNIOR Occupational Therapist OT                  OT Recommendation and Plan  Planned Therapy Interventions (OT): activity tolerance training, adaptive equipment training, BADL retraining, functional balance retraining, IADL retraining, occupation/activity based interventions, patient/caregiver education/training, ROM/therapeutic exercise, strengthening exercise, transfer/mobility retraining  Therapy Frequency (OT): daily  Plan of Care Review  Plan of Care Reviewed With: patient  Progress: no change  Outcome Evaluation: OT evaluation completed. The pt presents below her functional baseline with generalized weakness, decreased activity tolerance, balance deficits, and decreased safety awareness. The pt completed LBD and hair care with set up and SBA while sitting in chair. The pt  ambulated <household distance using a RWx with Bryce for safety. The pt will benefit from continued IP OT services to increase the pt's safety and independence during ADLs and functional mobility. Recommend a d/c to IRF for best outcome.     Time Calculation:   Evaluation Complexity (OT)  Review Occupational Profile/Medical/Therapy History Complexity: expanded/moderate complexity  Assessment, Occupational Performance/Identification of Deficit Complexity: 3-5 performance deficits  Clinical Decision Making Complexity (OT): detailed assessment/moderate complexity  Overall Complexity of Evaluation (OT): moderate complexity     Time Calculation- OT       Row Name 04/05/24 1316             Time Calculation- OT    OT Start Time 1011  -KF      OT Received On 04/05/24  -KF      OT Goal Re-Cert Due Date 04/15/24  -KF         Untimed Charges    OT Eval/Re-eval Minutes 46  -KF         Total Minutes    Untimed Charges Total Minutes 46  -KF       Total Minutes 46  -KF                User Key  (r) = Recorded By, (t) = Taken By, (c) = Cosigned By      Initials Name Provider Type    KF Eloisa Hodges OT Occupational Therapist                  Therapy Charges for Today       Code Description Service Date Service Provider Modifiers Qty    [id number] HC OT EVAL MOD COMPLEXITY 4 4/5/2024 Eloisa Hodges OT GO 1                 Eloisa Hodges OT  4/5/2024

## 2024-04-05 NOTE — THERAPY DISCHARGE NOTE
Acute Care - Speech Language Pathology   Swallow Re-Evaluation/Discharge [facility]  Clinical Swallow Evaluation      Patient Name: Arcelia Quintero  : 1970  MRN: [id number]  Today's Date: 2024               Admit Date: 2024    Visit Dx:    ICD-10-CM ICD-9-CM   1. Acute respiratory failure with hypoxia  J96.01 518.81   2. Altered mental status, unspecified altered mental status type  R41.82 780.97   3. Hypertensive emergency  I16.1 401.9   4. History of psychogenic nonepileptic seizure  Z87.898 V13.89   5. Dysphagia, unspecified type  R13.10 787.20     Patient Active Problem List   Diagnosis    Anxiety and depression    Chronic idiopathic constipation    Dyslipidemia    Emphysema/COPD    Primary hypertension    GERD (gastroesophageal reflux disease)    Irritable bowel syndrome    Migraine    H/O Seizures on Keppra    Carpal tunnel syndrome    Internal hemorrhoids    Hemoptysis    Atypical chest pain    Agoraphobia    Genital ulcer, female    Tobacco use    Bilateral leg numbness    Low back pain    History of Guillain-[place] syndrome due to influenza immunization    COPD (chronic obstructive pulmonary disease)    Cervical high risk HPV (human papillomavirus) test positive    Abnormal Pap smear of cervix    PTSD (post-traumatic stress disorder)    H/O PNES    AMS (altered mental status)    Hypertensive emergency    Hypokalemia    History of stroke    CKD (chronic kidney disease), stage III    Former smoker    Psychogenic nonepileptic seizure    Abnormal nuclear stress test    Other chest pain    Encephalopathy acute    Acute respiratory failure with hypercapnia     Past Medical History:   Diagnosis Date    Abnormal Pap smear of cervix     Allergic rhinitis     Asthma     Atypical chest pain     Brain tumor     Status post brain tumor resection in .    Candidiasis of mouth     Cervical high risk HPV (human papillomavirus) test positive 2020    CKD (chronic kidney disease), stage III     Colon  polyps 09/09/2020    sessile and tubular adenoma    Conversion disorder     COPD (chronic obstructive pulmonary disease)     COVID-19     Elevated liver enzymes     Former smoker 07/05/2023    H/O Helicobacter infection      Status post EGD 9/4/2012, pathology revealed H. pylori infection.    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     Neurology evaluation thought it was numbness from carpal tunnel and not Guillain-[place]    Hyperlipidemia     Hypertension     Influenza     Internal hemorrhoids     Low grade squamous intraepith lesion on cytologic smear cervix (lgsil) 09/01/2020    Median neuropathy     Migraine     PTSD (post-traumatic stress disorder)     Seizures     2 seizures after surgery    Small fiber neuropathy     Stroke     Tinea corporis      Past Surgical History:   Procedure Laterality Date    BRAIN SURGERY      status post brain tumor resection    CARDIAC CATHETERIZATION N/A 12/27/2023    Procedure: Left Heart Cath;  Surgeon: Ryder Delarosa III, MD;  Location: [facility] CATH INVASIVE LOCATION;  Service: Cardiovascular;  Laterality: N/A;    COLONOSCOPY      polyp removal    LIVER BIOPSY      UPPER GASTROINTESTINAL ENDOSCOPY      Status post EGD 9/4/2012, pathology revealed H. pylori infection.       SLP Recommendation and Plan  SLP Swallowing Diagnosis: swallow WFL/no suspected pharyngeal impairment (04/05/24 0845)  SLP Diet Recommendation: regular textures, thin liquids (04/05/24 0845)     Monitor for Signs of Aspiration: yes, notify SLP if any concerns, other (see comments) (reconsult if any concerns.) (04/05/24 0845)     Swallow Criteria for Skilled Therapeutic Interventions Met: no problems identified which require skilled intervention (04/05/24 0845)        Therapy Frequency (Swallow): evaluation only (04/05/24 0845)                                           Plan of Care Reviewed With: patient (04/05/24 1031)  Progress: improving (04/05/24  1031)    SWALLOW EVALUATION (Last 72 Hours)       SLP Adult Swallow Evaluation       Row Name 04/05/24 0845 04/04/24 1430                Rehab Evaluation    Document Type re-evaluation  - evaluation  -MP       Subjective Information no complaints  -SM no complaints  -MP       Patient Observations alert;cooperative  - alert;cooperative  -       Patient/Family/Caregiver Comments/Observations -- Dtr present  -MP       Patient Effort good  -SM good  -MP          General Information    Patient Profile Reviewed -- yes  -MP       Pertinent History Of Current Problem -- Adm 4/2 w/ acute encephalopathy, found unresponsive. Intubated 4/2-4/4. MRI w/ no acute. CXR w/ no acute. Hx tobacco abuse, emphysema/COPD, chronic migraines, PNES, conversion d/o, ? brain tumor, HTN, CKD3, GERD, dyslipidemia.  -MP       Current Method of Nutrition NPO;large-bore  -SM NPO;large-bore  -MP       Precautions/Limitations, Vision -- WFL  -MP       Precautions/Limitations, Hearing -- WFL  -MP       Prior Level of Function-Communication -- WFL  -MP       Prior Level of Function-Swallowing safe, efficient swallowing in all situations  - no diet consistency restrictions  -MP       Plans/Goals Discussed with patient;agreed upon  -SM patient;family;agreed upon  -       Barriers to Rehab none identified  - medically complex  -       Patient's Goals for Discharge return to PO diet  - patient did not state  -MP       Family Goals for Discharge -- family did not state  -MP          Pain    Additional Documentation Pain Scale: Numbers Pre/Post-Treatment (Group)  -SM Pain Scale: FACES Pre/Post-Treatment (Group)  -MP          Pain Scale: Numbers Pre/Post-Treatment    Pretreatment Pain Rating 0/10 - no pain  -SM --       Posttreatment Pain Rating 0/10 - no pain  -SM --          Pain Scale: FACES Pre/Post-Treatment    Pain: FACES Scale, Pretreatment -- 0-->no hurt  -MP       Posttreatment Pain Rating -- 0-->no hurt  -MP          Oral Motor  Structure and Function    Dentition Assessment -- edentulous  -MP          Oral Musculature and Cranial Nerve Assessment    Oral Motor General Assessment -- generalized oral motor weakness;vocal impairment  -MP       Vocal Impairment, Detail. Cranial Nerve X (Vagus) -- vocal quality abnormality (see comments)  -MP       Oral Motor, Comment -- hoarse  -MP          General Eating/Swallowing Observations    Respiratory Support Currently in Use nasal cannula  -SM nasal cannula  -MP       Eating/Swallowing Skills -- fed by SLP  -MP       Positioning During Eating -- upright in bed  -MP       Utensils Used -- spoon;cup  -MP       Consistencies Trialed -- ice chips;thin liquids;pureed  -MP          Clinical Swallow Eval    Oral Prep Phase WFL  -SM --       Oral Transit WFL  -SM --       Oral Residue WFL  -SM --       Pharyngeal Phase no overt signs/symptoms of pharyngeal impairment  -SM suspected pharyngeal impairment  -MP       Esophageal Phase unremarkable  -SM --       Clinical Swallow Evaluation Summary Started session with attempt at FEES. Pt nose very tender. Pt very willing to attempt passes though coud not tolerate 2' pain. Took break from attempts with small sips of water. Pt was showing no s/s aspiration, as seen yesterday. Voice quality improved, adjusted assessment to clinical re-assessment. Pushed with thin liquids via straw, pudding, and solids. No s/s aspiration with any consistency.  -SM Throat clear/cough w/ thin, throat clear & multiple swallows w/ puree. Rec NPO/alt route, few ice chips/hr, and FEES tomorrow to further assess.  -MP          Pharyngeal Phase Concerns    Pharyngeal Phase Concerns -- multiple swallows;cough;throat clear  -MP       Multiple Swallows -- pudding  -MP       Cough -- thin  -MP       Throat Clear -- thin;pudding  -MP          SLP Evaluation Clinical Impression    SLP Swallowing Diagnosis swallow WFL/no suspected pharyngeal impairment  -SM suspected pharyngeal dysphagia  -MP        Functional Impact -- risk of aspiration/pneumonia  -       Rehab Potential/Prognosis, Swallowing -- good, to achieve stated therapy goals  -       Swallow Criteria for Skilled Therapeutic Interventions Met no problems identified which require skilled intervention  - demonstrates skilled criteria  -          Recommendations    Therapy Frequency (Swallow) evaluation only  - --       Predicted Duration Therapy Intervention (Days) -- until discharge  -       SLP Diet Recommendation regular textures;thin liquids  - NPO;temporary alternate methods of nutrition/hydration;other (see comments)  few ice chips/hr as tolerated, w/ supervision  -       Recommended Diagnostics -- FEES;other (see comments)  4/5  -       Recommended Precautions and Strategies general aspiration precautions  general precautions for next 2 days  - --       Oral Care Recommendations -- Oral Care BID/PRN;Suction toothbrush  -       SLP Rec. for Method of Medication Administration meds via alternate route  - meds via alternate route  -       Monitor for Signs of Aspiration yes;notify SLP if any concerns;other (see comments)  reconsult if any concerns.  - notify SLP if any concerns  -       Anticipated Discharge Disposition (SLP) -- inpatient rehabilitation facility  -                 User Key  (r) = Recorded By, (t) = Taken By, (c) = Cosigned By      Initials Name Effective Dates     Brea Rhodes MS CCC-SLP 02/03/23 -     Beto Collazo MS CCC-SLP 12/28/21 -                     EDUCATION  The patient has been educated in the following areas:   Dysphagia (Swallowing Impairment) Modified Diet Instruction.                 Time Calculation:    Time Calculation- SLP       Row Name 04/05/24 1031             Time Calculation- [facility]    SLP Start Time 0845  -      SLP Received On 04/05/24  -         Untimed Charges    55620-[id number] Eval Oral Pharyng Swallow Minutes 88  -         Total Minutes    Untimed  Charges Total Minutes 88  -SM       Total Minutes 88  -SM                User Key  (r) = Recorded By, (t) = Taken By, (c) = Cosigned By      Initials Name Provider Type    Brea Diaz MS CCC-SLP Speech and Language Pathologist                    Therapy Charges for Today       Code Description Service Date Service Provider Modifiers Qty    [id number] HC ST EVAL ORAL PHARYNG SWALLOW 6 4/5/2024 Brea Rhodes MS CCC-SLP GN 1                      Brea Rhodes MS CCC-SLP  4/5/2024

## 2024-04-05 NOTE — PROGRESS NOTES
"Neurology       Patient Care Team:  Coby Ely PA-C as PCP - General (Physician Assistant)  Carolina Tello MD as Consulting Physician (Neurology)  Elliott Hunt MD as Consulting Physician (Gastroenterology)  Ryder Delarosa III, MD as Cardiologist (Cardiology)  Mary Canales APRN as Nurse Practitioner (Psychiatry)    Chief complaint: Malignant hypertension    History: The patient is awake alert passed a swallowing exam.    She has been ambulating with physical therapy..  With Occupational Therapy she was able to walk \"household distances\".  Was recommended inpatient rehab.          Past Medical History:   Diagnosis Date    Abnormal Pap smear of cervix     Allergic rhinitis     Asthma     Atypical chest pain     Brain tumor     Status post brain tumor resection in 1989.    Candidiasis of mouth     Cervical high risk HPV (human papillomavirus) test positive 09/01/2020    CKD (chronic kidney disease), stage III     Colon polyps 09/09/2020    sessile and tubular adenoma    Conversion disorder     COPD (chronic obstructive pulmonary disease)     COVID-19     Elevated liver enzymes     Former smoker 07/05/2023    H/O Helicobacter infection      Status post EGD 9/4/2012, pathology revealed H. pylori infection.    Headache     migraines    History of Guillain-[place] syndrome due to influenza immunization     Neurology evaluation thought it was numbness from carpal tunnel and not Guillain-[place]    Hyperlipidemia     Hypertension     Influenza     Internal hemorrhoids     Low grade squamous intraepith lesion on cytologic smear cervix (lgsil) 09/01/2020    Median neuropathy     Migraine     PTSD (post-traumatic stress disorder)     Seizures     2 seizures after surgery    Small fiber neuropathy     Stroke     Tinea corporis        Vital Signs   Vitals:    04/05/24 0810 04/05/24 1100 04/05/24 1200 04/05/24 1305   BP:  155/89 153/89    BP Location:       Patient Position:       Pulse: 71 75 69 71 "   Resp:       Temp:       TempSrc:       SpO2: 92% 96% 94% 94%   Weight:       Height:           Physical Exam:   General: Pleasant and alert              Neuro: Hoarse but articulate otherwise.    No word finding problems.    Face is symmetrical.    Extremities move well.        Results Review:  Labs reviewed  Results from last 7 days   Lab Units 04/05/24  0352   WBC 10*3/mm3 7.08   HEMOGLOBIN g/dL 11.4*   HEMATOCRIT % 35.1   PLATELETS 10*3/mm3 116*     Results from last 7 days   Lab Units 04/05/24  0352 04/04/24  0351 04/03/24  1717 04/03/24  0432 04/02/24  1507   SODIUM mmol/L 141 132* 143   < > 136   POTASSIUM mmol/L 3.9 4.0 4.1   < > 3.8   CHLORIDE mmol/L 111* 103 110*   < > 104   CO2 mmol/L 24.0 22.0 24.0   < > 23.0   BUN mg/dL 25* 24* 24*   < > 15   CREATININE mg/dL 0.81 0.74 1.05*   < > 0.82   CALCIUM mg/dL 8.3* 8.6 8.6   < > 9.3   BILIRUBIN mg/dL  --   --   --   --  0.3   ALK PHOS U/L  --   --   --   --  106   ALT (SGPT) U/L  --   --   --   --  11   AST (SGOT) U/L  --   --   --   --  13   GLUCOSE mg/dL 83 132* 122*   < > 104*    < > = values in this interval not displayed.       Imaging Results (Last 24 Hours)       Procedure Component Value Units Date/Time    SLP FEES - Fiberoptic Endo Eval Swallow [[id number]] Resulted: 04/05/24 1019     Updated: 04/05/24 1019    Narrative:      This procedure was auto-finalized with no dictation required.            Assessment:  Malignant hypertension with blackout and possible seizure.        Plan:  Disposition pending results of ongoing therapy.    Inpatient rehab versus home with home health.    No evidence of epilepsy or significant neurologic sequelae    Comment:  Okay to discharge anytime from a neurology standpoint.  Will see the patient on request         I discussed the patients findings and my recommendations with patient    Fredy Montague MD  04/05/24  13:39 EDT

## 2024-04-05 NOTE — THERAPY EVALUATION
Patient Name: Arcelia Quintero  : 1970    MRN: [id number]                              Today's Date: 2024       Admit Date: 2024    Visit Dx:     ICD-10-CM ICD-9-CM   1. Acute respiratory failure with hypoxia  J96.01 518.81   2. Altered mental status, unspecified altered mental status type  R41.82 780.97   3. Hypertensive emergency  I16.1 401.9   4. History of psychogenic nonepileptic seizure  Z87.898 V13.89   5. 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    COPD (chronic obstructive pulmonary disease)    Cervical high risk HPV (human papillomavirus) test positive    Abnormal Pap smear of cervix    PTSD (post-traumatic stress disorder)    H/O PNES    AMS (altered mental status)    Hypertensive emergency    Hypokalemia    History of stroke    CKD (chronic kidney disease), stage III    Former smoker    Psychogenic nonepileptic seizure    Abnormal nuclear stress test    Other chest pain    Encephalopathy acute    Acute respiratory failure with hypercapnia     Past Medical History:   Diagnosis Date    Abnormal Pap smear of cervix     Allergic rhinitis     Asthma     Atypical chest pain     Brain tumor     Status post brain tumor resection in .    Candidiasis of mouth     Cervical high risk HPV (human papillomavirus) test positive 2020    CKD (chronic kidney disease), stage III     Colon polyps 2020    sessile and tubular adenoma    Conversion disorder     COPD (chronic  obstructive pulmonary disease)     COVID-19     Elevated liver enzymes     Former smoker 07/05/2023    H/O Helicobacter infection      Status post EGD 9/4/2012, pathology revealed H. pylori infection.    Headache     migraines    History of Guillain-Scheller syndrome due to influenza immunization     Neurology evaluation thought it was numbness from carpal tunnel and not Guillain-Scheller    Hyperlipidemia     Hypertension     Influenza     Internal hemorrhoids     Low grade squamous intraepith lesion on cytologic smear cervix (lgsil) 09/01/2020    Median neuropathy     Migraine     PTSD (post-traumatic stress disorder)     Seizures     2 seizures after surgery    Small fiber neuropathy     Stroke     Tinea corporis      Past Surgical History:   Procedure Laterality Date    BRAIN SURGERY      status post brain tumor resection    CARDIAC CATHETERIZATION N/A 12/27/2023    Procedure: Left Heart Cath;  Surgeon: Ryder Delarosa III, MD;  Location: [facility] CATH INVASIVE LOCATION;  Service: Cardiovascular;  Laterality: N/A;    COLONOSCOPY      polyp removal    LIVER BIOPSY      UPPER GASTROINTESTINAL ENDOSCOPY      Status post EGD 9/4/2012, pathology revealed H. pylori infection.      General Information       Row Name 04/05/24 1348          Physical Therapy Time and Intention    Document Type evaluation  -ND     Mode of Treatment physical therapy  -ND       Row Name 04/05/24 1340          General Information    Patient Profile Reviewed yes  -ND     Prior Level of Function independent:;all household mobility;gait;transfer;bed mobility  No DME. Denies recent falls.  -ND     Existing Precautions/Restrictions fall;cardiac;oxygen therapy device and L/min;other (see comments)  Extubated 4/4.  -ND     Barriers to Rehab medically complex;previous functional deficit  -ND       Row Name 04/05/24 1342          Living Environment    People in Home child(collin), adult  -ND       Row Name 04/05/24 1348          Home Main Entrance    Number of  Stairs, Main Entrance two  -ND     Stair Railings, Main Entrance none  -ND       Row Name 04/05/24 1348          Stairs Within Home, Primary    Number of Stairs, Within Home, Primary none  -ND       Row Name 04/05/24 1348          Cognition    Orientation Status (Cognition) oriented x 3  -ND       Row Name 04/05/24 1348          Safety Issues, Functional Mobility    Safety Issues Affecting Function (Mobility) awareness of need for assistance;insight into deficits/self-awareness;safety precaution awareness;safety precautions follow-through/compliance;sequencing abilities  -ND     Impairments Affecting Function (Mobility) balance;endurance/activity tolerance;shortness of breath;strength  -ND               User Key  (r) = Recorded By, (t) = Taken By, (c) = Cosigned By      Initials Name Provider Type    Lee Ann Peterson PT Physical Therapist                   Mobility       Row Name 04/05/24 1349          Bed Mobility    Bed Mobility supine-sit  -ND     Supine-Sit Stafford (Bed Mobility) standby assist;verbal cues;nonverbal cues (demo/gesture)  -ND     Assistive Device (Bed Mobility) bed rails;head of bed elevated  -ND     Comment, (Bed Mobility) Increased time to completed task.  -ND       Row Name 04/05/24 1349          Bed-Chair Transfer    Bed-Chair Stafford (Transfers) minimum assist (75% patient effort);1 person assist;verbal cues;nonverbal cues (demo/gesture)  -ND     Comment, (Bed-Chair Transfer) SPT from EOB > chair.  -ND       Row Name 04/05/24 1349          Sit-Stand Transfer    Sit-Stand Stafford (Transfers) minimum assist (75% patient effort);1 person assist;verbal cues;nonverbal cues (demo/gesture)  -ND     Assistive Device (Sit-Stand Transfers) walker, front-wheeled  -ND     Comment, (Sit-Stand Transfer) x1 from EOB without support, x1 from chair with RWx.  -ND       Row Name 04/05/24 1349          Gait/Stairs (Locomotion)    Stafford Level (Gait) contact guard;1 person  assist;verbal cues;nonverbal cues (demo/gesture)  -ND     Assistive Device (Gait) walker, front-wheeled  -ND     Distance in Feet (Gait) 75  -ND     Deviations/Abnormal Patterns (Gait) bilateral deviations;issa decreased;gait speed decreased;stride length decreased;weight shifting decreased  -ND     Bilateral Gait Deviations forward flexed posture;heel strike decreased  -ND     Comment, (Gait/Stairs) Pt ambulates with step-through pattern, minor forward flexed posture, decreased speed, and decreased heel strike. Pt denies PARISH with ambulation but SpO2 decreased to 88% while ambulating. Pt takes standing rest break and continues ambulating once SpO2 increased to 92%.  -ND               User Key  (r) = Recorded By, (t) = Taken By, (c) = Cosigned By      Initials Name Provider Type    Lee Ann Peterson PT Physical Therapist                   Obj/Interventions       Row Name 04/05/24 1352          Range of Motion Comprehensive    General Range of Motion bilateral lower extremity ROM WFL  -ND       Row Name 04/05/24 1352          Strength Comprehensive (MMT)    General Manual Muscle Testing (MMT) Assessment lower extremity strength deficits identified  -ND     Comment, General Manual Muscle Testing (MMT) Assessment BLE MMT 4/5.  -ND       Row Name 04/05/24 1352          Motor Skills    Motor Skills coordination  -ND     Coordination bilateral;heel to shin;WFL  -ND       Row Name 04/05/24 1352          Balance    Balance Assessment sitting static balance;sitting dynamic balance;sit to stand dynamic balance;standing static balance;standing dynamic balance  -ND     Static Sitting Balance standby assist  -ND     Dynamic Sitting Balance contact guard  -ND     Position, Sitting Balance unsupported;sitting edge of bed  -ND     Sit to Stand Dynamic Balance minimal assist;1-person assist;verbal cues;non-verbal cues (demo/gesture)  -ND     Static Standing Balance contact guard;verbal cues;non-verbal cues (demo/gesture)  -ND      Dynamic Standing Balance minimal assist;1-person assist;verbal cues;non-verbal cues (demo/gesture)  -ND     Position/Device Used, Standing Balance supported;walker, front-wheeled  -ND     Balance Interventions sitting;standing;sit to stand;supported;static;dynamic  -ND     Comment, Balance No overt LOB noted with ambulation but pt with general instability.  -ND       Row Name 04/05/24 1352          Sensory Assessment (Somatosensory)    Sensory Assessment (Somatosensory) LE sensation intact  -ND               User Key  (r) = Recorded By, (t) = Taken By, (c) = Cosigned By      Initials Name Provider Type    ND Lee Ann Pena, PT Physical Therapist                   Goals/Plan    No documentation.                  Clinical Impression       Row Name 04/05/24 1352          Pain    Pretreatment Pain Rating 0/10 - no pain  -ND     Posttreatment Pain Rating 0/10 - no pain  -ND     Pain Intervention(s) Repositioned;Ambulation/increased activity  -ND       Row Name 04/05/24 1352          Plan of Care Review    Plan of Care Reviewed With patient  -ND     Outcome Evaluation PT evaluation completed. Pt presenting below baseline level of mobility with generalized weakness, impaired balance, limited activity tolerance, and impaired safety awareness warranting IP PT services to address deficits and facilitate return to PLOF. Recommend IRF following d/c.  -ND       Row Name 04/05/24 1352          Therapy Assessment/Plan (PT)    Patient/Family Therapy Goals Statement (PT) Return to PLOF.  -ND     Rehab Potential (PT) good, to achieve stated therapy goals  -ND     Criteria for Skilled Interventions Met (PT) yes;meets criteria;skilled treatment is necessary  -ND     Therapy Frequency (PT) daily  -ND       Row Name 04/05/24 1352          Vital Signs    Pre Systolic BP Rehab 168  -ND     Pre Treatment Diastolic BP 81  -ND     Post Systolic BP Rehab --  With OT.  -ND     Pretreatment Heart Rate (beats/min) 75  -ND     Pre SpO2 (%)  93  -ND     O2 Delivery Pre Treatment nasal cannula  -ND     Intra SpO2 (%) 88  -ND     O2 Delivery Intra Treatment nasal cannula  -ND     Post SpO2 (%) 92  -ND     O2 Delivery Post Treatment nasal cannula  -ND     Pre Patient Position Supine  -ND     Intra Patient Position Standing  -ND     Post Patient Position Sitting  -ND       Row Name 04/05/24 1352          Positioning and Restraints    Pre-Treatment Position in bed  -ND     Post Treatment Position chair  -ND     In Chair notified nsg;sitting;with OT;waffle cushion  -ND               User Key  (r) = Recorded By, (t) = Taken By, (c) = Cosigned By      Initials Name Provider Type    Lee Ann Peterson, JONNY Physical Therapist                   Outcome Measures       Row Name 04/05/24 1354          How much help from another person do you currently need...    Turning from your back to your side while in flat bed without using bedrails? 4  -ND     Moving from lying on back to sitting on the side of a flat bed without bedrails? 3  -ND     Moving to and from a bed to a chair (including a wheelchair)? 3  -ND     Standing up from a chair using your arms (e.g., wheelchair, bedside chair)? 3  -ND     Climbing 3-5 steps with a railing? 3  -ND     To walk in hospital room? 3  -ND     AM-PAC 6 Clicks Score (PT) 19  -ND     Highest Level of Mobility Goal 6 --> Walk 10 steps or more  -ND       Row Name 04/05/24 1354 04/05/24 1315       Functional Assessment    Outcome Measure Options AM-PAC 6 Clicks Basic Mobility (PT)  -ND AM-PAC 6 Clicks Daily Activity (OT)  -KF              User Key  (r) = Recorded By, (t) = Taken By, (c) = Cosigned By      Initials Name Provider Type    Eloisa Ruggiero OT Occupational Therapist    Lee Ann Peterson, PT Physical Therapist                                 Physical Therapy Education       Title: PT OT SLP Therapies (In Progress)       Topic: Physical Therapy (In Progress)       Point: Mobility training (Done)       Learning Progress  Summary             Patient Acceptance, E, VU by ND at 4/5/2024 1355                         Point: Home exercise program (Not Started)       Learner Progress:  Not documented in this visit.              Point: Body mechanics (Done)       Learning Progress Summary             Patient Acceptance, E, VU by ND at 4/5/2024 1355                         Point: Precautions (Done)       Learning Progress Summary             Patient Acceptance, E, VU by ND at 4/5/2024 1355                                         User Key       Initials Effective Dates Name Provider Type Discipline    ND 11/16/23 -  Lee Ann Pena, JONNY Physical Therapist PT                  PT Recommendation and Plan     Plan of Care Reviewed With: patient  Outcome Evaluation: PT evaluation completed. Pt presenting below baseline level of mobility with generalized weakness, impaired balance, limited activity tolerance, and impaired safety awareness warranting IP PT services to address deficits and facilitate return to PLOF. Recommend IRF following d/c.     Time Calculation:   PT Evaluation Complexity  History, PT Evaluation Complexity: 3 or more personal factors and/or comorbidities  Examination of Body Systems (PT Eval Complexity): total of 3 or more elements  Clinical Presentation (PT Evaluation Complexity): evolving  Clinical Decision Making (PT Evaluation Complexity): moderate complexity  Overall Complexity (PT Evaluation Complexity): moderate complexity     PT Charges       Row Name 04/05/24 1355             Time Calculation    Start Time 0958  -ND      PT Received On 04/05/24  -ND      PT Goal Re-Cert Due Date 04/15/24  -ND         Untimed Charges    PT Eval/Re-eval Minutes 50  -ND         Total Minutes    Untimed Charges Total Minutes 50  -ND       Total Minutes 50  -ND                User Key  (r) = Recorded By, (t) = Taken By, (c) = Cosigned By      Initials Name Provider Type    Lee Ann Peterson, PT Physical Therapist                   Therapy Charges for Today       Code Description Service Date Service Provider Modifiers Qty    22170152640 HC PT EVAL MOD COMPLEXITY 4 4/5/2024 Lee Ann Pena, PT GP 1            PT G-Codes  Outcome Measure Options: AM-PAC 6 Clicks Basic Mobility (PT)  AM-PAC 6 Clicks Score (PT): 19  AM-PAC 6 Clicks Score (OT): 19  PT Discharge Summary  Anticipated Discharge Disposition (PT): inpatient rehabilitation facility    Lee Ann Pena, PT  4/5/2024     Statement Selected

## 2024-04-05 NOTE — PLAN OF CARE
Goal Outcome Evaluation:  Plan of Care Reviewed With: patient        Progress: improving             SLP Swallowing Diagnosis: swallow WFL/no suspected pharyngeal impairment (04/05/24 5222)

## 2024-04-05 NOTE — PROGRESS NOTES
INTENSIVIST   PROGRESS NOTE     Hospital:  LOS: 3 days     Ms. Arcelia Quintero, 53 y.o. female is followed for a Chief Complaint of: Altered Mental Status      Subjective   S     Interval History:  No acute events. Passed a Speech evaluation this AM.        The patient's relevant past medical, surgical and social history were reviewed and updated in Epic as appropriate.      ROS:   Constitutional: Negative for fever.   Respiratory: Negative for dyspnea.   Cardiovascular: Negative for chest pain.   Gastrointestinal: Negative for  nausea, vomiting and diarrhea.     Objective   O     Vitals:  Temp  Min: 98.2 °F (36.8 °C)  Max: 98.8 °F (37.1 °C)  BP  Min: 90/52  Max: 161/85  Pulse  Min: 60  Max: 86  Resp  Min: 12  Max: 20  SpO2  Min: 81 %  Max: 100 % Flow (L/min)  Min: 2  Max: 3    Intake/Ouptut 24 hrs (7:00AM - 6:59 AM)  Intake & Output (last 3 days)         03/31 0701  04/01 0700 04/01 0701  04/02 0700 04/02 0701  04/03 0700 04/03 0701  04/04 0700    I.V. (mL/kg)   209 (2.9) 296.4 (4.1)    NG/GT   60     IV Piggyback    500    Total Intake(mL/kg)   269 (3.7) 796.4 (10.9)    Urine (mL/kg/hr)   400 190 (0.3)    Emesis/NG output   200 210    Total Output   600 400    Net   -331 +396.4                    Medications (drips):  norepinephrine, Last Rate: Stopped (04/04/24 1100)  Pharmacy to dose vancomycin          Physical Examination  Telemetry:  Normal sinus rhythm.    Constitutional:  No acute distress.  Sitting up in a chair on nasal cannula. NGT in place.    Eyes: No scleral icterus.   PERRL, EOM intact.    Neck:  Supple, FROM   Cardiovascular: Normal rate, regular and rhythm. Normal heart sounds.  No murmurs, gallop or rub.   Respiratory: No respiratory distress. Normal respiratory effort.  Normal breath sounds  Clear to auscultation   Abdominal:  Soft. No masses. Nontender. No distension. No HSM.   Extremities: No digital cyanosis. No clubbing.  No peripheral edema.   Skin: No rashes, lesions or ulcers    Neurological:   Alert and interactive.              Results from last 7 days   Lab Units 04/05/24  0352 04/04/24  0351 04/03/24  0432   WBC 10*3/mm3 7.08 12.79* 12.15*   HEMOGLOBIN g/dL 11.4* 13.3 15.0   MCV fL 94.9 94.9 91.1   PLATELETS 10*3/mm3 116* 166 178     Results from last 7 days   Lab Units 04/05/24  0352 04/04/24  0351 04/03/24  1717   SODIUM mmol/L 141 132* 143   POTASSIUM mmol/L 3.9 4.0 4.1   CO2 mmol/L 24.0 22.0 24.0   CREATININE mg/dL 0.81 0.74 1.05*   GLUCOSE mg/dL 83 132* 122*   MAGNESIUM mg/dL 2.0 2.4 1.9   PHOSPHORUS mg/dL 2.5 2.6 3.6     Estimated Creatinine Clearance: 86.7 mL/min (by C-G formula based on SCr of 0.81 mg/dL).  Results from last 7 days   Lab Units 04/02/24  1507   ALK PHOS U/L 106   BILIRUBIN mg/dL 0.3   ALT (SGPT) U/L 11   AST (SGOT) U/L 13       Results from last 7 days   Lab Units 04/04/24  0334 04/03/24  1208 04/02/24  1635   PH, ARTERIAL pH units 7.350 7.303* 7.258*   PCO2, ARTERIAL mm Hg 41.9 47.3* 49.3*   PO2 ART mm Hg 83.0 75.7* 228.0*   FIO2 % 40 45 100       Images:  Imaging Results (Last 24 Hours)       Procedure Component Value Units Date/Time    SLP FEES - Fiberoptic Endo Eval Swallow [[id number]] Resulted: 04/05/24 1019     Updated: 04/05/24 1019    Narrative:      This procedure was auto-finalized with no dictation required.               Results: Reviewed.  I reviewed the patient's new laboratory and imaging results.  I independently reviewed the patient's new images.    Medications: Reviewed.    Assessment & Plan   A / P     Ms. Quintero is a 52yo F who presented to the [facility] ED on 4/2/24 with suspected CVA. Upon arrival, she was noted to have a SBP > 220. She was intubated in the ED for airway protection. She was admitted to the ICU on mechanical ventilation.     CVA workup including MRI brain was negative. Neurology is following and suspects malignant hypertension.     She was extubated on the morning of 4/4/24.     Nutrition:   Diet: Cardiac; Healthy Heart (2-3  Na+); Texture: Regular (IDDSI 7); Fluid Consistency: Thin (IDDSI 0)  Advance Directives:   There are no questions and answers to display.       Active Hospital Problems    Diagnosis     **Encephalopathy acute     Acute respiratory failure with hypercapnia     CKD (chronic kidney disease), stage III     Hypertensive emergency     H/O PNES     Emphysema/COPD     Anxiety and depression     Dyslipidemia     GERD (gastroesophageal reflux disease)        Assessment / Plan:    Wean supplemental oxygen.   Check MRSA PCR. If negative, will discontinue Vancomycin. Cultures negative. Will change Cefepime to Augmentin to complete a course of empiric antibiotics.    Neurology following.   Noted to have T-wave inversion. Troponin elevated but then decreased. Negative LHC in December 2023. Echocardiogram unremarkable as well.   D/c NGT.  Restart home Coreg.   AM labs  Okay to transfer to telemetry.     High level of risk due to:  severe exacerbation of chronic illness and illness with threat to life or bodily function.    Plan of care and goals reviewed during interdisciplinary rounds.  I discussed the patient's findings and my recommendations with patient and nursing staff        Jenae Garcia, DO    Intensive Care Medicine and Pulmonary Medicine

## 2024-04-05 NOTE — PLAN OF CARE
Goal Outcome Evaluation:                   Pt had a good day today. Passed swallow eval, chan taken out, NG tube taken out. Pt alert and oriented X 3. Vital signs stable, Transfer orders in place. Bedside report will be given.

## 2024-04-06 LAB
ANION GAP SERPL CALCULATED.3IONS-SCNC: 8 MMOL/L (ref 5–15)
BACTERIA SPEC RESP CULT: ABNORMAL
BACTERIA SPEC RESP CULT: ABNORMAL
BILIRUB UR QL STRIP: NEGATIVE
BUN SERPL-MCNC: 17 MG/DL (ref 6–20)
BUN/CREAT SERPL: 21.8 (ref 7–25)
CALCIUM SPEC-SCNC: 8.2 MG/DL (ref 8.6–10.5)
CHLORIDE SERPL-SCNC: 108 MMOL/L (ref 98–107)
CLARITY UR: CLEAR
CO2 SERPL-SCNC: 24 MMOL/L (ref 22–29)
COLOR UR: YELLOW
CREAT SERPL-MCNC: 0.78 MG/DL (ref 0.57–1)
DEPRECATED RDW RBC AUTO: 42.5 FL (ref 37–54)
EGFRCR SERPLBLD CKD-EPI 2021: 91.5 ML/MIN/1.73
ERYTHROCYTE [DISTWIDTH] IN BLOOD BY AUTOMATED COUNT: 12.8 % (ref 12.3–15.4)
GLUCOSE SERPL-MCNC: 120 MG/DL (ref 65–99)
GLUCOSE UR STRIP-MCNC: NEGATIVE MG/DL
GRAM STN SPEC: ABNORMAL
HCT VFR BLD AUTO: 33.6 % (ref 34–46.6)
HGB BLD-MCNC: 11.5 G/DL (ref 12–15.9)
HGB UR QL STRIP.AUTO: NEGATIVE
KETONES UR QL STRIP: NEGATIVE
LEUKOCYTE ESTERASE UR QL STRIP.AUTO: NEGATIVE
MAGNESIUM SERPL-MCNC: 1.7 MG/DL (ref 1.6–2.6)
MCH RBC QN AUTO: 30.8 PG (ref 26.6–33)
MCHC RBC AUTO-ENTMCNC: 34.2 G/DL (ref 31.5–35.7)
MCV RBC AUTO: 90.1 FL (ref 79–97)
NITRITE UR QL STRIP: NEGATIVE
PH UR STRIP.AUTO: 6.5 [PH] (ref 5–8)
PHOSPHATE SERPL-MCNC: 2.3 MG/DL (ref 2.5–4.5)
PLATELET # BLD AUTO: 130 10*3/MM3 (ref 140–450)
PMV BLD AUTO: 11.2 FL (ref 6–12)
POTASSIUM SERPL-SCNC: 3.7 MMOL/L (ref 3.5–5.2)
PROT UR QL STRIP: NEGATIVE
RBC # BLD AUTO: 3.73 10*6/MM3 (ref 3.77–5.28)
SODIUM SERPL-SCNC: 140 MMOL/L (ref 136–145)
SP GR UR STRIP: 1.01 (ref 1–1.03)
UROBILINOGEN UR QL STRIP: NORMAL
WBC NRBC COR # BLD AUTO: 6.69 10*3/MM3 (ref 3.4–10.8)

## 2024-04-06 PROCEDURE — 99232 SBSQ HOSP IP/OBS MODERATE 35: CPT | Performed by: INTERNAL MEDICINE

## 2024-04-06 PROCEDURE — 94761 N-INVAS EAR/PLS OXIMETRY MLT: CPT

## 2024-04-06 PROCEDURE — 84100 ASSAY OF PHOSPHORUS: CPT | Performed by: INTERNAL MEDICINE

## 2024-04-06 PROCEDURE — 94664 DEMO&/EVAL PT USE INHALER: CPT

## 2024-04-06 PROCEDURE — 83735 ASSAY OF MAGNESIUM: CPT | Performed by: INTERNAL MEDICINE

## 2024-04-06 PROCEDURE — 80048 BASIC METABOLIC PNL TOTAL CA: CPT | Performed by: INTERNAL MEDICINE

## 2024-04-06 PROCEDURE — 94799 UNLISTED PULMONARY SVC/PX: CPT

## 2024-04-06 PROCEDURE — 81003 URINALYSIS AUTO W/O SCOPE: CPT

## 2024-04-06 PROCEDURE — 25010000002 FUROSEMIDE PER 20 MG: Performed by: INTERNAL MEDICINE

## 2024-04-06 PROCEDURE — 85027 COMPLETE CBC AUTOMATED: CPT | Performed by: INTERNAL MEDICINE

## 2024-04-06 RX ORDER — FLUOXETINE HYDROCHLORIDE 20 MG/1
20 CAPSULE ORAL DAILY
Status: DISCONTINUED | OUTPATIENT
Start: 2024-04-06 | End: 2024-04-08 | Stop reason: HOSPADM

## 2024-04-06 RX ORDER — AMOXICILLIN 250 MG
2 CAPSULE ORAL 2 TIMES DAILY
Status: DISCONTINUED | OUTPATIENT
Start: 2024-04-06 | End: 2024-04-08 | Stop reason: HOSPADM

## 2024-04-06 RX ORDER — BISACODYL 5 MG/1
5 TABLET, DELAYED RELEASE ORAL DAILY PRN
Status: DISCONTINUED | OUTPATIENT
Start: 2024-04-06 | End: 2024-04-08 | Stop reason: HOSPADM

## 2024-04-06 RX ORDER — POLYETHYLENE GLYCOL 3350 17 G/17G
17 POWDER, FOR SOLUTION ORAL DAILY PRN
Status: DISCONTINUED | OUTPATIENT
Start: 2024-04-06 | End: 2024-04-08 | Stop reason: HOSPADM

## 2024-04-06 RX ORDER — BISACODYL 10 MG
10 SUPPOSITORY, RECTAL RECTAL DAILY PRN
Status: DISCONTINUED | OUTPATIENT
Start: 2024-04-06 | End: 2024-04-08 | Stop reason: HOSPADM

## 2024-04-06 RX ORDER — ASPIRIN 81 MG/1
81 TABLET ORAL DAILY
Status: DISCONTINUED | OUTPATIENT
Start: 2024-04-06 | End: 2024-04-08 | Stop reason: HOSPADM

## 2024-04-06 RX ORDER — PANTOPRAZOLE SODIUM 40 MG/1
40 TABLET, DELAYED RELEASE ORAL
Status: DISCONTINUED | OUTPATIENT
Start: 2024-04-07 | End: 2024-04-08 | Stop reason: HOSPADM

## 2024-04-06 RX ORDER — ATORVASTATIN CALCIUM 40 MG/1
40 TABLET, FILM COATED ORAL NIGHTLY
Status: DISCONTINUED | OUTPATIENT
Start: 2024-04-06 | End: 2024-04-08 | Stop reason: HOSPADM

## 2024-04-06 RX ORDER — ACETAMINOPHEN 325 MG/1
650 TABLET ORAL EVERY 6 HOURS PRN
Status: DISCONTINUED | OUTPATIENT
Start: 2024-04-06 | End: 2024-04-07

## 2024-04-06 RX ORDER — FUROSEMIDE 10 MG/ML
40 INJECTION INTRAMUSCULAR; INTRAVENOUS ONCE
Status: COMPLETED | OUTPATIENT
Start: 2024-04-06 | End: 2024-04-06

## 2024-04-06 RX ORDER — AMLODIPINE BESYLATE 5 MG/1
5 TABLET ORAL DAILY
Status: DISCONTINUED | OUTPATIENT
Start: 2024-04-06 | End: 2024-04-07

## 2024-04-06 RX ADMIN — SENNOSIDES AND DOCUSATE SODIUM 2 TABLET: 8.6; 5 TABLET ORAL at 08:19

## 2024-04-06 RX ADMIN — IPRATROPIUM BROMIDE AND ALBUTEROL SULFATE 3 ML: 2.5; .5 SOLUTION RESPIRATORY (INHALATION) at 07:44

## 2024-04-06 RX ADMIN — IPRATROPIUM BROMIDE AND ALBUTEROL SULFATE 3 ML: 2.5; .5 SOLUTION RESPIRATORY (INHALATION) at 17:08

## 2024-04-06 RX ADMIN — Medication 10 ML: at 10:25

## 2024-04-06 RX ADMIN — AMLODIPINE BESYLATE 5 MG: 5 TABLET ORAL at 10:24

## 2024-04-06 RX ADMIN — ACETAMINOPHEN 650 MG: 325 TABLET ORAL at 14:37

## 2024-04-06 RX ADMIN — ATORVASTATIN CALCIUM 40 MG: 40 TABLET, FILM COATED ORAL at 20:10

## 2024-04-06 RX ADMIN — AMOXICILLIN AND CLAVULANATE POTASSIUM 1 TABLET: 875; 125 TABLET, FILM COATED ORAL at 08:19

## 2024-04-06 RX ADMIN — PANTOPRAZOLE SODIUM 40 MG: 40 INJECTION, POWDER, FOR SOLUTION INTRAVENOUS at 05:57

## 2024-04-06 RX ADMIN — ASPIRIN 81 MG: 81 TABLET, COATED ORAL at 10:24

## 2024-04-06 RX ADMIN — AMOXICILLIN AND CLAVULANATE POTASSIUM 1 TABLET: 875; 125 TABLET, FILM COATED ORAL at 20:10

## 2024-04-06 RX ADMIN — FLUOXETINE 20 MG: 20 CAPSULE ORAL at 10:24

## 2024-04-06 RX ADMIN — Medication 10 ML: at 20:11

## 2024-04-06 RX ADMIN — CARVEDILOL 12.5 MG: 12.5 TABLET, FILM COATED ORAL at 08:19

## 2024-04-06 RX ADMIN — IPRATROPIUM BROMIDE AND ALBUTEROL SULFATE 3 ML: 2.5; .5 SOLUTION RESPIRATORY (INHALATION) at 12:53

## 2024-04-06 RX ADMIN — CARVEDILOL 12.5 MG: 12.5 TABLET, FILM COATED ORAL at 20:10

## 2024-04-06 RX ADMIN — FUROSEMIDE 40 MG: 10 INJECTION, SOLUTION INTRAMUSCULAR; INTRAVENOUS at 10:24

## 2024-04-06 RX ADMIN — ACETAMINOPHEN 650 MG: 650 SOLUTION ORAL at 04:10

## 2024-04-06 RX ADMIN — SENNOSIDES AND DOCUSATE SODIUM 2 TABLET: 8.6; 5 TABLET ORAL at 20:10

## 2024-04-06 NOTE — PLAN OF CARE
Goal Outcome Evaluation:      Pt A&Ox4, VSS, 3L NC, frequent productive cough/wheezing, NSR on tele, c/o HA overnight- treated w/tylenol. Ambulated to bathroom several times, complaining of dysuria r/t removal of chan. Pt stated that she is not agreeable to inpatient rehab services and wishes to be discharged home when medically ready. No acute changes overnight.

## 2024-04-06 NOTE — PROGRESS NOTES
Intensive Care Follow-up      LOS: 4 days     Ms. Arcelia Quintero, 53 y.o. female is followed for: Encephalopathy acute     Subjective - Interval History     Ms. Quintero is a 54yo F who presented to the [facility] ED on 4/2/24 with suspected CVA. Upon arrival, she was noted to have a SBP > 220. She was intubated in the ED for airway protection. She was admitted to the ICU on mechanical ventilation.      CVA workup including MRI brain was negative. Neurology is following and suspects malignant hypertension.      She was extubated on the morning of 4/4/24.     Interval history    Awake and alert this morning  Remains on 4 L/min via nasal cannula  Blood pressure elevated  On no continuous infusions    The patient's relevant past medical, surgical and social history were reviewed and updated in Epic as appropriate.     Objective     Infusions:     Medications:  amLODIPine, 5 mg, Oral, Daily  amoxicillin-clavulanate, 1 tablet, Oral, Q12H  aspirin, 81 mg, Oral, Daily  atorvastatin, 40 mg, Oral, Daily  carvedilol, 12.5 mg, Oral, Q12H  FLUoxetine, 20 mg, Oral, Daily  ipratropium-albuterol, 3 mL, Nebulization, 4x Daily - RT  [START ON 4/7/2024] pantoprazole, 40 mg, Oral, Q AM  senna-docusate sodium, 2 tablet, Oral, BID  sodium chloride, 10 mL, Intravenous, Q12H      Intake/Output         04/05/24 0700 - 04/06/24 0659 04/06/24 0700 - 04/07/24 0659    Intake (ml) 1650 0    Output (ml) 925 250    Net (ml) 725 -250          Vital Sign Min/Max for last 24 hours  Temp  Min: 98 °F (36.7 °C)  Max: 100.2 °F (37.9 °C)   BP  Min: 142/79  Max: 175/93   Pulse  Min: 54  Max: 86   Resp  Min: 12  Max: 20   SpO2  Min: 89 %  Max: 96 %   Flow (L/min)  Min: 2  Max: 3        Physical Exam:   GENERAL: Awake, no distress   HEENT: No adenopathy or thyromegaly   LUNGS: Decreased breath sounds with a few crackles and wheezes   HEART: Regular rate and rhythm   GI: Soft, nontender   EXTREMITIES: Trace edema without clubbing or cyanosis   NEURO/PSYCH: Awake,  alert.  Appropriate follows commands    Results from last 7 days   Lab Units 04/06/24  0405 04/05/24  0352 04/04/24  0351   WBC 10*3/mm3 6.69 7.08 12.79*   HEMOGLOBIN g/dL 11.5* 11.4* 13.3   PLATELETS 10*3/mm3 130* 116* 166     Results from last 7 days   Lab Units 04/06/24  0405 04/05/24  0352 04/04/24  0351   SODIUM mmol/L 140 141 132*   POTASSIUM mmol/L 3.7 3.9 4.0   CO2 mmol/L 24.0 24.0 22.0   BUN mg/dL 17 25* 24*   CREATININE mg/dL 0.78 0.81 0.74   MAGNESIUM mg/dL 1.7 2.0 2.4   PHOSPHORUS mg/dL 2.3* 2.5 2.6   GLUCOSE mg/dL 120* 83 132*     Estimated Creatinine Clearance: 90 mL/min (by C-G formula based on SCr of 0.78 mg/dL).          Results from last 7 days   Lab Units 04/04/24  0334   PH, ARTERIAL pH units 7.350   PCO2, ARTERIAL mm Hg 41.9   PO2 ART mm Hg 83.0     Lab Results   Component Value Date    LACTATE 1.2 04/02/2024          Images: Most recent chest x-ray without consolidation or effusions    I reviewed the patient's results and images.     Impression      Active Hospital Problems    Diagnosis     **Encephalopathy acute     Acute respiratory failure with hypercapnia     CKD (chronic kidney disease), stage III     Hypertensive emergency     H/O PNES     Emphysema/COPD     Anxiety and depression     Dyslipidemia     GERD (gastroesophageal reflux disease)             Plan        Restart home Norvasc  Restart some of her other home medications such as aspirin, statin, and antidepressant  Mild diuresis  Mobilize and wean FiO2 as able  Stable for transfer to the floor     Plan of care and goals reviewed with Multidisciplinary Team and Antibiotic Stewardship rounds   I discussed the patient's findings and my recommendations with patient and nursing staff        MARTY Canales MD  Pulmonary and Critical Care Medicine

## 2024-04-07 LAB
ANION GAP SERPL CALCULATED.3IONS-SCNC: 8 MMOL/L (ref 5–15)
BUN SERPL-MCNC: 12 MG/DL (ref 6–20)
BUN/CREAT SERPL: 16 (ref 7–25)
CALCIUM SPEC-SCNC: 8.8 MG/DL (ref 8.6–10.5)
CHLORIDE SERPL-SCNC: 106 MMOL/L (ref 98–107)
CO2 SERPL-SCNC: 27 MMOL/L (ref 22–29)
CREAT SERPL-MCNC: 0.75 MG/DL (ref 0.57–1)
EGFRCR SERPLBLD CKD-EPI 2021: 95.9 ML/MIN/1.73
GLUCOSE SERPL-MCNC: 105 MG/DL (ref 65–99)
POTASSIUM SERPL-SCNC: 3.8 MMOL/L (ref 3.5–5.2)
SODIUM SERPL-SCNC: 141 MMOL/L (ref 136–145)

## 2024-04-07 PROCEDURE — 94799 UNLISTED PULMONARY SVC/PX: CPT

## 2024-04-07 PROCEDURE — 80048 BASIC METABOLIC PNL TOTAL CA: CPT | Performed by: INTERNAL MEDICINE

## 2024-04-07 PROCEDURE — 94664 DEMO&/EVAL PT USE INHALER: CPT

## 2024-04-07 PROCEDURE — 99232 SBSQ HOSP IP/OBS MODERATE 35: CPT | Performed by: FAMILY MEDICINE

## 2024-04-07 RX ORDER — LORAZEPAM 1 MG/1
1 TABLET ORAL EVERY 6 HOURS PRN
Status: DISCONTINUED | OUTPATIENT
Start: 2024-04-07 | End: 2024-04-08 | Stop reason: HOSPADM

## 2024-04-07 RX ORDER — AMLODIPINE BESYLATE 10 MG/1
10 TABLET ORAL DAILY
Status: DISCONTINUED | OUTPATIENT
Start: 2024-04-07 | End: 2024-04-08 | Stop reason: HOSPADM

## 2024-04-07 RX ORDER — ACETAMINOPHEN 325 MG/1
325 TABLET ORAL EVERY 6 HOURS PRN
Status: DISCONTINUED | OUTPATIENT
Start: 2024-04-07 | End: 2024-04-08 | Stop reason: HOSPADM

## 2024-04-07 RX ORDER — HYDROCODONE BITARTRATE AND ACETAMINOPHEN 5; 325 MG/1; MG/1
1 TABLET ORAL EVERY 6 HOURS PRN
Status: DISCONTINUED | OUTPATIENT
Start: 2024-04-07 | End: 2024-04-08 | Stop reason: HOSPADM

## 2024-04-07 RX ADMIN — ATORVASTATIN CALCIUM 40 MG: 40 TABLET, FILM COATED ORAL at 20:21

## 2024-04-07 RX ADMIN — CARVEDILOL 12.5 MG: 12.5 TABLET, FILM COATED ORAL at 08:41

## 2024-04-07 RX ADMIN — IPRATROPIUM BROMIDE AND ALBUTEROL SULFATE 3 ML: 2.5; .5 SOLUTION RESPIRATORY (INHALATION) at 14:38

## 2024-04-07 RX ADMIN — CARVEDILOL 12.5 MG: 12.5 TABLET, FILM COATED ORAL at 20:21

## 2024-04-07 RX ADMIN — SENNOSIDES AND DOCUSATE SODIUM 2 TABLET: 8.6; 5 TABLET ORAL at 20:21

## 2024-04-07 RX ADMIN — ASPIRIN 81 MG: 81 TABLET, COATED ORAL at 08:41

## 2024-04-07 RX ADMIN — AMOXICILLIN AND CLAVULANATE POTASSIUM 1 TABLET: 875; 125 TABLET, FILM COATED ORAL at 20:21

## 2024-04-07 RX ADMIN — Medication 10 ML: at 08:43

## 2024-04-07 RX ADMIN — AMOXICILLIN AND CLAVULANATE POTASSIUM 1 TABLET: 875; 125 TABLET, FILM COATED ORAL at 08:41

## 2024-04-07 RX ADMIN — AMLODIPINE BESYLATE 5 MG: 5 TABLET ORAL at 08:41

## 2024-04-07 RX ADMIN — PANTOPRAZOLE SODIUM 40 MG: 40 TABLET, DELAYED RELEASE ORAL at 05:19

## 2024-04-07 RX ADMIN — IPRATROPIUM BROMIDE AND ALBUTEROL SULFATE 3 ML: 2.5; .5 SOLUTION RESPIRATORY (INHALATION) at 06:40

## 2024-04-07 RX ADMIN — SENNOSIDES AND DOCUSATE SODIUM 2 TABLET: 8.6; 5 TABLET ORAL at 08:41

## 2024-04-07 RX ADMIN — LORAZEPAM 1 MG: 1 TABLET ORAL at 20:21

## 2024-04-07 RX ADMIN — HYDROCODONE BITARTRATE AND ACETAMINOPHEN 1 TABLET: 5; 325 TABLET ORAL at 12:41

## 2024-04-07 RX ADMIN — ACETAMINOPHEN 650 MG: 325 TABLET ORAL at 08:41

## 2024-04-07 RX ADMIN — IPRATROPIUM BROMIDE AND ALBUTEROL SULFATE 3 ML: 2.5; .5 SOLUTION RESPIRATORY (INHALATION) at 11:15

## 2024-04-07 RX ADMIN — AMLODIPINE BESYLATE 10 MG: 10 TABLET ORAL at 12:50

## 2024-04-07 RX ADMIN — IPRATROPIUM BROMIDE AND ALBUTEROL SULFATE 3 ML: 2.5; .5 SOLUTION RESPIRATORY (INHALATION) at 20:33

## 2024-04-07 RX ADMIN — FLUOXETINE 20 MG: 20 CAPSULE ORAL at 08:41

## 2024-04-07 NOTE — PLAN OF CARE
Goal Outcome Evaluation:   Aox4 room air to 2lnc pt is resting in bed call light within reach

## 2024-04-07 NOTE — PROGRESS NOTES
[facility] Medicine Services  PROGRESS NOTE    Patient Name: Arcelia Quintero  : 1970  MRN: [id number]    Date of Admission: 2024  Primary Care Physician: Coby Ely PA-C    Subjective   Subjective     CC:  Encephalopathy    HPI:  Patient is a 53-year-old transferred out of the ICU where she was admitted, intubated on 2024, extubated on 2024 with initial concern for CVA secondary to severe encephalopathy but now thought to have malignant hypertension.  MRI was negative for infarct.  Neurology was consulted and EEG was normal although there was some concern for suspected seizure.  He is now tolerating regular diet and ambulating with assistance.  Referrals for rehab have been initiated.  Blood pressures are much improved but still elevated.  She also still complains of a headache today.      Objective   Objective     Vital Signs:   Temp:  [97.9 °F (36.6 °C)-99.1 °F (37.3 °C)] 97.9 °F (36.6 °C)  Heart Rate:  [57-68] 66  Resp:  [16-20] 18  BP: (128-176)/() 140/99  Flow (L/min):  [1-2] 2     Physical Exam:  Constitutional: No acute distress, awake, alert  HENT: NCAT, mucous membranes moist  Respiratory: Clear to auscultation bilaterally, respiratory effort normal   Cardiovascular: RRR  Gastrointestinal: Positive bowel sounds, soft, nontender, nondistended  Musculoskeletal: No bilateral ankle edema  Psychiatric: Appropriate affect, cooperative  Neurologic: Oriented x 3, strength symmetric in all extremities, Cranial Nerves grossly intact to confrontation, speech clear  Skin: No rashes      Results Reviewed:  LAB RESULTS:      Lab 24  0405 24  0352 24  0351 24  0432 24  1510 24  1501 24  1500   WBC 6.69 7.08 12.79* 12.15* 6.33  --   --    HEMOGLOBIN 11.5* 11.4* 13.3 15.0 15.3  --   --    HEMOGLOBIN, POC  --   --   --   --   --    < >  --    HEMATOCRIT 33.6* 35.1 41.1 45.3 44.5  --   --    HEMATOCRIT POC  --   --   --    --   --    < >  --    PLATELETS 130* 116* 166 178 186  --   --    NEUTROS ABS  --   --   --   --  2.80  --   --    IMMATURE GRANS (ABS)  --   --   --   --  0.01  --   --    LYMPHS ABS  --   --   --   --  2.67  --   --    MONOS ABS  --   --   --   --  0.59  --   --    EOS ABS  --   --   --   --  0.21  --   --    MCV 90.1 94.9 94.9 91.1 90.1  --   --    PROCALCITONIN  --   --   --   --  0.04  --   --    LACTATE  --   --   --   --  1.2  --   --    PROTIME  --   --   --   --   --   --  15.6*    < > = values in this interval not displayed.         Lab 04/07/24  0720 04/06/24  0405 04/05/24  0352 04/04/24  0351 04/03/24  1717 04/03/24  0432 04/02/24  1510   SODIUM 141 140 141 132* 143 139  --    POTASSIUM 3.8 3.7 3.9 4.0 4.1 4.4  --    CHLORIDE 106 108* 111* 103 110* 105  --    CO2 27.0 24.0 24.0 22.0 24.0 21.0*  --    ANION GAP 8.0 8.0 6.0 7.0 9.0 13.0  --    BUN 12 17 25* 24* 24* 20  --    CREATININE 0.75 0.78 0.81 0.74 1.05* 1.25*  --    EGFR 95.9 91.5 87.5 97.5 64.1 52.0*  --    GLUCOSE 105* 120* 83 132* 122* 120*  --    CALCIUM 8.8 8.2* 8.3* 8.6 8.6 8.8  --    MAGNESIUM  --  1.7 2.0 2.4 1.9 2.0  --    PHOSPHORUS  --  2.3* 2.5 2.6 3.6 5.5*  --    TSH  --   --   --   --   --   --  1.850         Lab 04/02/24  1510 04/02/24  1507   TOTAL PROTEIN  --  6.9   ALBUMIN  --  4.2   GLOBULIN  --  2.7   ALT (SGPT)  --  11   AST (SGOT)  --  13   BILIRUBIN  --  0.3   ALK PHOS  --  106   LIPASE 43  --          Lab 04/03/24  1956 04/03/24  1717 04/02/24  1507 04/02/24  1500   PROBNP  --  2,303.0*  --   --    HSTROP T 89* 100* <6  --    PROTIME  --   --   --  15.6*   INR  --   --   --  1.3*                 Lab 04/04/24  0334 04/03/24  1208 04/02/24  1635   PH, ARTERIAL 7.350 7.303* 7.258*   PCO2, ARTERIAL 41.9 47.3* 49.3*   PO2 ART 83.0 75.7* 228.0*   FIO2 40 45 100   HCO3 ART 23.1 23.4 22.0   BASE EXCESS ART -2.4* -3.3* -5.6*   CARBOXYHEMOGLOBIN 1.1 1.3 3.2*     Brief Urine Lab Results  (Last result in the past 365 days)         Color   Clarity   Blood   Leuk Est   Nitrite   Protein   CREAT   Urine HCG        04/06/24 0609 Yellow   Clear   Negative   Negative   Negative   Negative                   Microbiology Results Abnormal       Procedure Component Value - Date/Time    Blood Culture - Blood, Arm, Left [[id number]]  (Normal) Collected: 04/03/24 0940    Lab Status: Preliminary result Specimen: Blood from Arm, Left Updated: 04/06/24 1015     Blood Culture No growth at 3 days    Narrative:      Less than seven (7) mL's of blood was collected.  Insufficient quantity may yield false negative results.    Blood Culture - Blood, Hand, Left [[id number]]  (Normal) Collected: 04/03/24 0930    Lab Status: Preliminary result Specimen: Blood from Hand, Left Updated: 04/06/24 1015     Blood Culture No growth at 3 days    Narrative:      Less than seven (7) mL's of blood was collected.  Insufficient quantity may yield false negative results.    MRSA Screen, PCR (Inpatient) - Swab, Nares [[id number]]  (Normal) Collected: 04/05/24 1134    Lab Status: Final result Specimen: Swab from Nares Updated: 04/05/24 1258     MRSA PCR Negative    Narrative:      The negative predictive value of this diagnostic test is high and should only be used to consider de-escalating anti-MRSA therapy. A positive result may indicate colonization with MRSA and must be correlated clinically.  MRSA Negative    Respiratory Panel PCR w/COVID-19(SARS-CoV-2) YENY/SHALOM/KIMMIE/PAD/COR/LUKAS In-House, NP Swab in UTM/VTM, 2 HR TAT - Swab, Nasopharynx [[id number]]  (Normal) Collected: 04/03/24 0840    Lab Status: Final result Specimen: Swab from Nasopharynx Updated: 04/03/24 1124     ADENOVIRUS, PCR Not Detected     Coronavirus 229E Not Detected     Coronavirus HKU1 Not Detected     Coronavirus NL63 Not Detected     Coronavirus OC43 Not Detected     COVID19 Not Detected     Human Metapneumovirus Not Detected     Human Rhinovirus/Enterovirus Not Detected     Influenza A PCR Not Detected     Influenza  B PCR Not Detected     Parainfluenza Virus 1 Not Detected     Parainfluenza Virus 2 Not Detected     Parainfluenza Virus 3 Not Detected     Parainfluenza Virus 4 Not Detected     RSV, PCR Not Detected     Bordetella pertussis pcr Not Detected     Bordetella parapertussis PCR Not Detected     Chlamydophila pneumoniae PCR Not Detected     Mycoplasma pneumo by PCR Not Detected    Narrative:      In the setting of a positive respiratory panel with a viral infection PLUS a negative procalcitonin without other underlying concern for bacterial infection, consider observing off antibiotics or discontinuation of antibiotics and continue supportive care. If the respiratory panel is positive for atypical bacterial infection (Bordetella pertussis, Chlamydophila pneumoniae, or Mycoplasma pneumoniae), consider antibiotic de-escalation to target atypical bacterial infection.            SLP FEES - Fiberoptic Endo Eval Swallow    Result Date: 4/5/2024  This procedure was auto-finalized with no dictation required.     Results for orders placed during the hospital encounter of 04/02/24    Adult Transthoracic Echo Complete W/ Cont if Necessary Per Protocol    Interpretation Summary    Left ventricular systolic function is normal. Calculated left ventricular EF = 62.8%    Insufficient TR velocity profile to estimate the right ventricular systolic pressure.    Normal left atrial size and volume noted.      Current medications:  Scheduled Meds:amLODIPine, 5 mg, Oral, Daily  amoxicillin-clavulanate, 1 tablet, Oral, Q12H  aspirin, 81 mg, Oral, Daily  atorvastatin, 40 mg, Oral, Nightly  carvedilol, 12.5 mg, Oral, Q12H  FLUoxetine, 20 mg, Oral, Daily  ipratropium-albuterol, 3 mL, Nebulization, 4x Daily - RT  pantoprazole, 40 mg, Oral, Q AM  senna-docusate sodium, 2 tablet, Oral, BID  sodium chloride, 10 mL, Intravenous, Q12H      Continuous Infusions:   PRN Meds:.  acetaminophen    senna-docusate sodium **AND** polyethylene glycol **AND**  bisacodyl **AND** bisacodyl    Calcium Replacement - Follow Nurse / BPA Driven Protocol    Magnesium Standard Dose Replacement - Follow Nurse / BPA Driven Protocol    nicotine polacrilex    nitroglycerin    ondansetron    Phosphorus Replacement - Follow Nurse / BPA Driven Protocol    Potassium Replacement - Follow Nurse / BPA Driven Protocol    Assessment & Plan   Assessment & Plan     Active Hospital Problems    Diagnosis  POA    **Encephalopathy acute [G93.40]  Yes    Acute respiratory failure with hypercapnia [J96.02]  Yes    CKD (chronic kidney disease), stage III [N18.30]  Yes    Hypertensive emergency [I16.1]  Yes    H/O PNES [Z87.898]  Yes    Emphysema/COPD [J43.9]  Yes    Anxiety and depression [F41.9, F32.A]  Yes    Dyslipidemia [E78.5]  Yes    GERD (gastroesophageal reflux disease) [K21.9]  Yes      Resolved Hospital Problems   No resolved problems to display.        Brief Hospital Course to date:  Arcelia Quintero is a 53 y.o. female who presented to [facility] emergency department on 4/2/2024 with suspected acute CVA with symptoms of encephalopathy.  Her blood pressure was noted to be greater than 220 systolic and she was intubated.  She was cared for in the intensive care unit.  MRI of the brain was negative.  Neurology was consulted and suspected malignant hypertension.  She was extubated on the morning of 4/4/2024.  She was transferred out of the ICU and the hospital service assumed care on 4/7/2024.    Malignant hypertension with headache  Initial concern for suspected CVA, MRI negative  Possible seizure per neurology  -Continue Coreg, Norvasc, Lipitor, aspirin  -No antiepileptic meds, EEG was normal on 4/3/2024  -Added Excedrin and Norco as needed for headache    Respiratory failure with hypercapnia  Intubated on 4/2/2024, extubated on 4/4/2024  COPD/emphysema  -Continue to wean O2 as tolerated, currently on 2 L nasal cannula  -Continue Augmentin as started by pulmonary empirically  -IV  Lasix as needed for diuresis    NG tube was discontinued while in the ICU  -Cardiac diet/thin liquids    Elevated creatinine  -Creatinine noted to be up to 1.25, now back to 0.75    Dyslipidemia  -Continue statin    GERD  -Continue Protonix    Anxiety/depression  -Continue Prozac    Weakness/debility  -PT and OT have recommended rehab, pending referrals    Expected Discharge Location and Transportation: Rehab versus home  Expected Discharge   Expected Discharge Date: 4/8/2024; Expected Discharge Time:      DVT prophylaxis:  Mechanical DVT prophylaxis orders are present.         AM-PAC 6 Clicks Score (PT): 23 (04/07/24 0683)    CODE STATUS:   Code Status and Medical Interventions:   Ordered at: 04/05/24 1324     Code Status (Patient has no pulse and is not breathing):    CPR (Attempt to Resuscitate)     Medical Interventions (Patient has pulse or is breathing):    Full Support       Zakiya Beltran MD  04/07/24

## 2024-04-08 ENCOUNTER — READMISSION MANAGEMENT (OUTPATIENT)
Dept: CALL CENTER | Facility: HOSPITAL | Age: 54
End: 2024-04-08
Payer: MEDICARE

## 2024-04-08 VITALS
HEART RATE: 63 BPM | HEIGHT: 67 IN | TEMPERATURE: 98.8 F | SYSTOLIC BLOOD PRESSURE: 166 MMHG | RESPIRATION RATE: 17 BRPM | DIASTOLIC BLOOD PRESSURE: 93 MMHG | WEIGHT: 152.8 LBS | BODY MASS INDEX: 23.98 KG/M2 | OXYGEN SATURATION: 96 %

## 2024-04-08 PROBLEM — J96.02 ACUTE RESPIRATORY FAILURE WITH HYPERCAPNIA: Status: RESOLVED | Noted: 2024-04-02 | Resolved: 2024-04-08

## 2024-04-08 PROBLEM — I16.1 HYPERTENSIVE EMERGENCY: Status: RESOLVED | Noted: 2021-05-28 | Resolved: 2024-04-08

## 2024-04-08 PROBLEM — G93.40 ENCEPHALOPATHY ACUTE: Status: RESOLVED | Noted: 2024-04-02 | Resolved: 2024-04-08

## 2024-04-08 LAB
BACTERIA SPEC AEROBE CULT: NORMAL
BACTERIA SPEC AEROBE CULT: NORMAL

## 2024-04-08 PROCEDURE — 99239 HOSP IP/OBS DSCHRG MGMT >30: CPT | Performed by: HOSPITALIST

## 2024-04-08 PROCEDURE — 99231 SBSQ HOSP IP/OBS SF/LOW 25: CPT | Performed by: PSYCHIATRY & NEUROLOGY

## 2024-04-08 PROCEDURE — 94799 UNLISTED PULMONARY SVC/PX: CPT

## 2024-04-08 PROCEDURE — 94664 DEMO&/EVAL PT USE INHALER: CPT

## 2024-04-08 RX ORDER — AMOXICILLIN AND CLAVULANATE POTASSIUM 875; 125 MG/1; MG/1
1 TABLET, FILM COATED ORAL EVERY 12 HOURS SCHEDULED
Qty: 3 TABLET | Refills: 0 | Status: SHIPPED | OUTPATIENT
Start: 2024-04-08 | End: 2024-04-10

## 2024-04-08 RX ORDER — AMLODIPINE BESYLATE 10 MG/1
10 TABLET ORAL DAILY
Qty: 90 TABLET | Refills: 0 | Status: SHIPPED | OUTPATIENT
Start: 2024-04-09

## 2024-04-08 RX ADMIN — ASPIRIN 81 MG: 81 TABLET, COATED ORAL at 08:25

## 2024-04-08 RX ADMIN — CARVEDILOL 12.5 MG: 12.5 TABLET, FILM COATED ORAL at 08:25

## 2024-04-08 RX ADMIN — AMLODIPINE BESYLATE 10 MG: 10 TABLET ORAL at 08:25

## 2024-04-08 RX ADMIN — FLUOXETINE 20 MG: 20 CAPSULE ORAL at 08:25

## 2024-04-08 RX ADMIN — SENNOSIDES AND DOCUSATE SODIUM 2 TABLET: 8.6; 5 TABLET ORAL at 08:25

## 2024-04-08 RX ADMIN — PANTOPRAZOLE SODIUM 40 MG: 40 TABLET, DELAYED RELEASE ORAL at 05:41

## 2024-04-08 RX ADMIN — AMOXICILLIN AND CLAVULANATE POTASSIUM 1 TABLET: 875; 125 TABLET, FILM COATED ORAL at 08:25

## 2024-04-08 RX ADMIN — LORAZEPAM 1 MG: 1 TABLET ORAL at 08:25

## 2024-04-08 RX ADMIN — IPRATROPIUM BROMIDE AND ALBUTEROL SULFATE 3 ML: 2.5; .5 SOLUTION RESPIRATORY (INHALATION) at 09:16

## 2024-04-08 NOTE — CASE MANAGEMENT/SOCIAL WORK
Case Management Discharge Note      Final Note: Spoke with patient at bedside. Patient refusing rehab at this time. Had a referral made with [facility]. Called and spoke with Lynn to update on patient's discharge plans. Patient requested home health. Called and spoke myrna Fowler at [facility], who has accepted patient for PT, OT, and skilled nursing. No other needs or concerns voiced at this time. Family will transport home.         Selected Continued Care - Admitted Since 4/2/2024       Destination    No services have been selected for the patient.                Durable Medical Equipment    No services have been selected for the patient.                Dialysis/Infusion    No services have been selected for the patient.                Home Medical Care Coordination complete.      Service Provider Selected Services Address Phone Fax Patient Preferred    [facility] ,  [facility] Rehabilitation [address], [address], [place] [address] [phone] --              Therapy    No services have been selected for the patient.                Community Resources    No services have been selected for the patient.                Community & DME    No services have been selected for the patient.                         Final Discharge Disposition Code: 06 - home with home health care

## 2024-04-08 NOTE — PLAN OF CARE
Problem: Fall Injury Risk  Goal: Absence of Fall and Fall-Related Injury  Intervention: Promote Injury-Free Environment  Recent Flowsheet Documentation  Taken 4/8/2024 0605 by Devin Sepulveda RN  Safety Promotion/Fall Prevention: safety round/check completed  Taken 4/8/2024 0405 by Devin Sepulveda RN  Safety Promotion/Fall Prevention: safety round/check completed  Taken 4/8/2024 0205 by Devin Sepulveda RN  Safety Promotion/Fall Prevention: safety round/check completed  Taken 4/8/2024 0005 by Devin Sepulveda RN  Safety Promotion/Fall Prevention: safety round/check completed  Taken 4/7/2024 2205 by Devin Sepulveda RN  Safety Promotion/Fall Prevention: safety round/check completed  Taken 4/7/2024 2005 by Devin Sepulveda RN  Safety Promotion/Fall Prevention: safety round/check completed     Problem: Skin Injury Risk Increased  Goal: Skin Health and Integrity  Intervention: Optimize Skin Protection  Recent Flowsheet Documentation  Taken 4/8/2024 0605 by Devin Sepulveda RN  Pressure Reduction Techniques: frequent weight shift encouraged  Head of Bed (HOB) Positioning: [facility] elevated  Pressure Reduction Devices: pressure-redistributing mattress utilized  Skin Protection: adhesive use limited  Taken 4/8/2024 0405 by Devin Sepulveda RN  Pressure Reduction Techniques: frequent weight shift encouraged  Head of Bed (HOB) Positioning: [facility] elevated  Pressure Reduction Devices: pressure-redistributing mattress utilized  Skin Protection: adhesive use limited  Taken 4/8/2024 0205 by Devin Sepulveda RN  Pressure Reduction Techniques: frequent weight shift encouraged  Head of Bed (HOB) Positioning: [facility] elevated  Pressure Reduction Devices: pressure-redistributing mattress utilized  Skin Protection: adhesive use limited  Taken 4/8/2024 0005 by Devin Sepulveda RN  Pressure Reduction Techniques: frequent weight shift encouraged  Head of Bed (HOB) Positioning: [facility] elevated  Pressure Reduction Devices:  pressure-redistributing mattress utilized  Skin Protection: adhesive use limited  Taken 4/7/2024 2205 by Devin Sepulveda RN  Pressure Reduction Techniques: frequent weight shift encouraged  Head of Bed (HOB) Positioning: HOB elevated  Pressure Reduction Devices: pressure-redistributing mattress utilized  Skin Protection: adhesive use limited  Taken 4/7/2024 2005 by Devin Sepulveda RN  Pressure Reduction Techniques: frequent weight shift encouraged  Head of Bed (HOB) Positioning: HOB elevated  Pressure Reduction Devices: pressure-redistributing mattress utilized  Skin Protection: adhesive use limited     Problem: Restraint, Nonviolent  Goal: Absence of Harm or Injury  Intervention: Implement Least Restrictive Safety Strategies  Recent Flowsheet Documentation  Taken 4/7/2024 2005 by Devin Sepulveda RN  Diversional Activities:   smartphone   television  Intervention: Protect Dignity, Rights, and Personal Wellbeing  Recent Flowsheet Documentation  Taken 4/7/2024 2005 by Devin Sepulveda RN  Trust Relationship/Rapport:   care explained   questions answered   questions encouraged   reassurance provided   thoughts/feelings acknowledged  Intervention: Protect Skin and Joint Integrity  Recent Flowsheet Documentation  Taken 4/8/2024 0605 by Devin Sepulveda RN  Body Position: position changed independently  Taken 4/8/2024 0405 by Devin Sepulveda RN  Body Position: position changed independently  Taken 4/8/2024 0205 by Devin Sepulveda RN  Body Position: position changed independently  Taken 4/8/2024 0005 by Devin Sepulveda RN  Body Position: position changed independently  Taken 4/7/2024 2205 by Devin Sepulveda RN  Body Position: position changed independently  Taken 4/7/2024 2005 by Devin Sepulveda RN  Body Position: position changed independently     Problem: Adult Inpatient Plan of Care  Goal: Absence of Hospital-Acquired Illness or Injury  Intervention: Identify and Manage Fall Risk  Recent  Flowsheet Documentation  Taken 4/8/2024 0605 by Devin Sepulveda RN  Safety Promotion/Fall Prevention: safety round/check completed  Taken 4/8/2024 0405 by Devin Sepulveda RN  Safety Promotion/Fall Prevention: safety round/check completed  Taken 4/8/2024 0205 by Devin Sepulveda RN  Safety Promotion/Fall Prevention: safety round/check completed  Taken 4/8/2024 0005 by Devin Sepulveda RN  Safety Promotion/Fall Prevention: safety round/check completed  Taken 4/7/2024 2205 by Devin Sepulveda RN  Safety Promotion/Fall Prevention: safety round/check completed  Taken 4/7/2024 2005 by Devin Sepulveda RN  Safety Promotion/Fall Prevention: safety round/check completed  Intervention: Prevent Skin Injury  Recent Flowsheet Documentation  Taken 4/8/2024 0605 by Devin Sepulveda RN  Body Position: position changed independently  Skin Protection: adhesive use limited  Taken 4/8/2024 0405 by Devin Sepulveda RN  Body Position: position changed independently  Skin Protection: adhesive use limited  Taken 4/8/2024 0205 by Devin Sepulveda RN  Body Position: position changed independently  Skin Protection: adhesive use limited  Taken 4/8/2024 0005 by Devin Sepulveda RN  Body Position: position changed independently  Skin Protection: adhesive use limited  Taken 4/7/2024 2205 by Devin Sepulveda RN  Body Position: position changed independently  Skin Protection: adhesive use limited  Taken 4/7/2024 2005 by Devin Sepulveda RN  Body Position: position changed independently  Skin Protection: adhesive use limited  Intervention: Prevent and Manage VTE (Venous Thromboembolism) Risk  Recent Flowsheet Documentation  Taken 4/7/2024 2005 by Devin Sepulveda RN  Activity Management: activity encouraged  Goal: Optimal Comfort and Wellbeing  Intervention: Provide Person-Centered Care  Recent Flowsheet Documentation  Taken 4/7/2024 2005 by Devin Sepulveda RN  Trust Relationship/Rapport:   care explained   questions  answered   questions encouraged   reassurance provided   thoughts/feelings acknowledged   Goal Outcome Evaluation:

## 2024-04-08 NOTE — DISCHARGE SUMMARY
[facility] Medicine Services  DISCHARGE SUMMARY    Patient Name: Arcelia Quintero  : 1970  MRN: [id number]    Date of Admission: 2024  2:52 PM  Date of Discharge:  2024  Primary Care Physician: Coby Ely PA-C    Consults       Date and Time Order Name Status Description    2024  9:14 PM Inpatient Neurology Consult General Completed             Hospital Course     Presenting Problem: unresponsive    Active Hospital Problems    Diagnosis  POA    CKD (chronic kidney disease), stage III [N18.30]  Yes    H/O PNES [Z87.898]  Yes    Emphysema/COPD [J43.9]  Yes    Anxiety and depression [F41.9, F32.A]  Yes    Dyslipidemia [E78.5]  Yes    GERD (gastroesophageal reflux disease) [K21.9]  Yes      Resolved Hospital Problems    Diagnosis Date Resolved POA    **Encephalopathy acute [G93.40] 2024 Yes    Acute respiratory failure with hypercapnia [J96.02] 2024 Yes    Hypertensive emergency [I16.1] 2024 Yes          Hospital Course:  Arcelia Quintero is a 53 y.o. female who presented to  emergency department on 2024 with suspected acute CVA with symptoms of encephalopathy.  Her blood pressure was noted to be greater than 220 systolic and she was intubated.  She was cared for in the intensive care unit.  MRI of the brain was negative.  Neurology was consulted and suspected malignant hypertension.  She was extubated on the morning of 2024.  She was transferred out of the ICU and the hospital service assumed care on 2024.    This patient's problems and plans were partially entered by my partner and updated as appropriate by me 24.     Malignant hypertension with headache  Initial concern for suspected CVA, MRI negative  Possible seizure per neurology  -Continue Coreg, Norvasc- increase to 10mg PO daily, Lipitor, aspirin  -No antiepileptic meds, EEG was normal on 4/3/2024  -prn Excedrin for headache     Respiratory  failure with hypercapnia  Intubated on 4/2/2024, extubated on 4/4/2024  COPD/emphysema  -Continue to wean O2 as tolerated, currently on 2 L nasal cannula  -Continue Augmentin as started by pulmonary empirically     NG tube was discontinued while in the ICU  -Cardiac diet/thin liquids     Elevated creatinine  -Creatinine noted to be up to 1.25, now back to normal (0.75)     Dyslipidemia  -Continue statin     GERD  -Continue Protonix     Anxiety/depression  -Continue Prozac     Weakness/debility  -- patient does not want to go to rehab- wants to go home    Discharge Follow Up Recommendations for outpatient labs/diagnostics:   - finish Augmentin regimen as prescribed  - increase Amlodipine from 5mg PO daily to 10mg  - continue previous dose of Carvedilol, aspirin and Lipitor  - as needed Excedrin over the counter for migraine  - f/u with PCP in 1 week    Day of Discharge     HPI:   Patient resting in bed, headache improved. Ready to go home.    Review of Systems  Gen- No fevers, chills  CV- No chest pain, palpitations  Resp- No cough, dyspnea  GI- No N/V/D, abd pain    Vital Signs:   Temp:  [98 °F (36.7 °C)-98.8 °F (37.1 °C)] 98.8 °F (37.1 °C)  Heart Rate:  [56-64] 63  Resp:  [16-18] 17  BP: (145-189)/(81-95) 166/93  Flow (L/min):  [2] 2      Physical Exam:  Constitutional: No acute distress, awake, alert  HENT: NCAT, mucous membranes moist  Respiratory: decreased to auscultation bilaterally, respiratory effort normal on RA  Cardiovascular: RRR, no murmurs, rubs, or gallops  Gastrointestinal: Positive bowel sounds, soft, nontender, nondistended  Musculoskeletal: No bilateral ankle edema  Psychiatric: Appropriate affect, cooperative  Neurologic: Oriented x 3, LONG, speech clear  Skin: No rashes    Pertinent  and/or Most Recent Results     LAB RESULTS:      Lab 04/06/24  0405 04/05/24  0352 04/04/24  0351 04/03/24  0432 04/02/24  1510 04/02/24  1501 04/02/24  1500   WBC 6.69 7.08 12.79* 12.15* 6.33  --   --    HEMOGLOBIN  11.5* 11.4* 13.3 15.0 15.3  --   --    HEMOGLOBIN, POC  --   --   --   --   --    < >  --    HEMATOCRIT 33.6* 35.1 41.1 45.3 44.5  --   --    HEMATOCRIT POC  --   --   --   --   --    < >  --    PLATELETS 130* 116* 166 178 186  --   --    NEUTROS ABS  --   --   --   --  2.80  --   --    IMMATURE GRANS (ABS)  --   --   --   --  0.01  --   --    LYMPHS ABS  --   --   --   --  2.67  --   --    MONOS ABS  --   --   --   --  0.59  --   --    EOS ABS  --   --   --   --  0.21  --   --    MCV 90.1 94.9 94.9 91.1 90.1  --   --    PROCALCITONIN  --   --   --   --  0.04  --   --    LACTATE  --   --   --   --  1.2  --   --    PROTIME  --   --   --   --   --   --  15.6*    < > = values in this interval not displayed.         Lab 04/07/24  0720 04/06/24  0405 04/05/24  0352 04/04/24  0351 04/03/24  1717 04/03/24  0432 04/02/24  1510   SODIUM 141 140 141 132* 143 139  --    POTASSIUM 3.8 3.7 3.9 4.0 4.1 4.4  --    CHLORIDE 106 108* 111* 103 110* 105  --    CO2 27.0 24.0 24.0 22.0 24.0 21.0*  --    ANION GAP 8.0 8.0 6.0 7.0 9.0 13.0  --    BUN 12 17 25* 24* 24* 20  --    CREATININE 0.75 0.78 0.81 0.74 1.05* 1.25*  --    EGFR 95.9 91.5 87.5 97.5 64.1 52.0*  --    GLUCOSE 105* 120* 83 132* 122* 120*  --    CALCIUM 8.8 8.2* 8.3* 8.6 8.6 8.8  --    MAGNESIUM  --  1.7 2.0 2.4 1.9 2.0  --    PHOSPHORUS  --  2.3* 2.5 2.6 3.6 5.5*  --    TSH  --   --   --   --   --   --  1.850         Lab 04/02/24  1510 04/02/24  1507   TOTAL PROTEIN  --  6.9   ALBUMIN  --  4.2   GLOBULIN  --  2.7   ALT (SGPT)  --  11   AST (SGOT)  --  13   BILIRUBIN  --  0.3   ALK PHOS  --  106   LIPASE 43  --          Lab 04/03/24  1956 04/03/24  1717 04/02/24  1507 04/02/24  1500   PROBNP  --  2,303.0*  --   --    HSTROP T 89* 100* <6  --    PROTIME  --   --   --  15.6*   INR  --   --   --  1.3*                 Lab 04/04/24  0334 04/03/24  1208 04/02/24  1635   PH, ARTERIAL 7.350 7.303* 7.258*   PCO2, ARTERIAL 41.9 47.3* 49.3*   PO2 ART 83.0 75.7* 228.0*   FIO2 40 45  100   HCO3 ART 23.1 23.4 22.0   BASE EXCESS ART -2.4* -3.3* -5.6*   CARBOXYHEMOGLOBIN 1.1 1.3 3.2*     Brief Urine Lab Results  (Last result in the past 365 days)        Color   Clarity   Blood   Leuk Est   Nitrite   Protein   CREAT   Urine HCG        04/06/24 0609 Yellow   Clear   Negative   Negative   Negative   Negative                 Microbiology Results (last 10 days)       Procedure Component Value - Date/Time    MRSA Screen, PCR (Inpatient) - Swab, Nares [[id number]]  (Normal) Collected: 04/05/24 1134    Lab Status: Final result Specimen: Swab from Nares Updated: 04/05/24 1258     MRSA PCR Negative    Narrative:      The negative predictive value of this diagnostic test is high and should only be used to consider de-escalating anti-MRSA therapy. A positive result may indicate colonization with MRSA and must be correlated clinically.  MRSA Negative    Respiratory Culture - Aspirate, ET Suction [[id number]]  (Abnormal)  (Susceptibility) Collected: 04/03/24 1031    Lab Status: Final result Specimen: Aspirate from ET Suction Updated: 04/06/24 0929     Respiratory Culture Heavy growth (4+) Streptococcus pneumoniae      Scant growth (1+) Normal Respiratory Amaya     Gram Stain Many (4+) WBCs per low power field      Many (4+) Gram positive cocci in pairs and chains      Rare (1+) Epithelial cells per low power field      Few (2+) Gram negative bacilli    Narrative:            Susceptibility        Streptococcus pneumoniae      KIMMY      Ceftriaxone (Meningitis) Susceptible      Ceftriaxone (Non-meningitis) Susceptible      Levofloxacin Susceptible      Penicillin (Meningitis) Susceptible                           Blood Culture - Blood, Arm, Left [[id number]]  (Normal) Collected: 04/03/24 0940    Lab Status: Final result Specimen: Blood from Arm, Left Updated: 04/08/24 1015     Blood Culture No growth at 5 days    Narrative:      Less than seven (7) mL's of blood was collected.  Insufficient quantity may yield false  negative results.    Blood Culture - Blood, Hand, Left [[id number]]  (Normal) Collected: 04/03/24 0930    Lab Status: Final result Specimen: Blood from Hand, Left Updated: 04/08/24 1015     Blood Culture No growth at 5 days    Narrative:      Less than seven (7) mL's of blood was collected.  Insufficient quantity may yield false negative results.    Respiratory Panel PCR w/COVID-19(SARS-CoV-2) YENY/[facility]/KIMMIE/PAD/COR/LUKAS In-House, NP Swab in UTM/VTM, 2 HR TAT - Swab, Nasopharynx [[id number]]  (Normal) Collected: 04/03/24 0840    Lab Status: Final result Specimen: Swab from Nasopharynx Updated: 04/03/24 1124     ADENOVIRUS, PCR Not Detected     Coronavirus 229E Not Detected     Coronavirus HKU1 Not Detected     Coronavirus NL63 Not Detected     Coronavirus OC43 Not Detected     COVID19 Not Detected     Human Metapneumovirus Not Detected     Human Rhinovirus/Enterovirus Not Detected     Influenza A PCR Not Detected     Influenza B PCR Not Detected     Parainfluenza Virus 1 Not Detected     Parainfluenza Virus 2 Not Detected     Parainfluenza Virus 3 Not Detected     Parainfluenza Virus 4 Not Detected     RSV, PCR Not Detected     Bordetella pertussis pcr Not Detected     Bordetella parapertussis PCR Not Detected     Chlamydophila pneumoniae PCR Not Detected     Mycoplasma pneumo by PCR Not Detected    Narrative:      In the setting of a positive respiratory panel with a viral infection PLUS a negative procalcitonin without other underlying concern for bacterial infection, consider observing off antibiotics or discontinuation of antibiotics and continue supportive care. If the respiratory panel is positive for atypical bacterial infection (Bordetella pertussis, Chlamydophila pneumoniae, or Mycoplasma pneumoniae), consider antibiotic de-escalation to target atypical bacterial infection.            SLP FEES - Fiberoptic Endo Eval Swallow    Result Date: 4/5/2024  This procedure was auto-finalized with no dictation  required.    Adult Transthoracic Echo Complete W/ Cont if Necessary Per Protocol    Result Date: 4/4/2024    Left ventricular systolic function is normal. Calculated left ventricular EF = 62.8%   Insufficient TR velocity profile to estimate the right ventricular systolic pressure.   Normal left atrial size and volume noted.     EEG    Result Date: 4/4/2024  Electroencephalogram Report Referring: Fredy Montague MD Indications: Altered mental state,?  Seizure EEG Description With the patient done portable in the ICU the background rhythm is 9 to 10/s with amplitudes of 15 to 20 µV. Drowsiness is notable for 5 to 6/s waves of 20 to 30 µV. No sleep was noted. Patient has a great deal of eye movement and movement in general. No epileptic activity is identified. No focal changes are seen. Classification Essentially normal EEG Interpretation This gives the appearance of a patient with sedation with no baseline epileptic disorder. Fredy Montague MD 04/04/24 10:28 EDT     XR Chest 1 View    Result Date: 4/4/2024  XR CHEST 1 VW Date of Exam: 4/4/2024 3:18 AM EDT Indication: intubated Comparison 4/2/2024 Findings: ET tube is unchanged in position. NG tube again noted although tip not included. Heart and pulmonary vessels appear within normal limits. Lung fields are well inflated and clear of acute infiltrates or effusions.     Impression: Unchanged support lines. No acute process identified in the lung fields. Electronically Signed: Linda Gomez MD  4/4/2024 7:28 AM EDT  Workstation ID: [id number]    MRI Brain Without Contrast    Result Date: 4/3/2024  MRI BRAIN WO CONTRAST Date of Exam: 4/3/2024 2:30 AM EDT Indication: Stroke, follow up unresponsiveness.  Comparison: 4/2/2024. Technique:  Routine multiplanar/multisequence sequence images of the brain were obtained without contrast administration. Findings: There is no diffusion restriction to suggest acute infarct. There is no evidence of acute or chronic  intracranial hemorrhage. No mass effect or midline shift. No abnormal extra-axial collections. The major vascular flow voids appear intact. The mild periventricular and subcortical FLAIR signal changes are present. Basal ganglia, brainstem and cerebellum appear within normal limits. Calvarial and superficial soft tissue signal is within normal limits. Orbits appear unremarkable. The paranasal sinuses  and the mastoid air cells appear well aerated. Midline structures are intact.     Impression: 1. No evidence of hemorrhage, mass effect or midline shift. No evidence of recent or acute ischemia. 2. Mild periventricular and subcortical FLAIR signal changes likely related to chronic microvascular ischemic change. Electronically Signed: Shanthi Dai MD  4/3/2024 3:04 AM EDT  Workstation ID: [id number]    XR Chest 1 View    Result Date: 4/2/2024  XR CHEST 1 VW Date of Exam: 4/2/2024 4:08 PM EDT Indication: Weak/Dizzy/AMS triage protocol Comparison: 1/9/2024 Findings: ET tube is seen in good position midway between the clavicles and yolie. NG tube is seen passing below the left hemidiaphragm. The heart mediastinum and pulmonary vasculature appear within normal limits. Lungs appear well expanded and clear bilaterally.     Impression: 1. ET tube and NG tube appear to be in satisfactory position. 2. No evidence of active chest disease is seen. Electronically Signed: Jonathan Gonzalez MD  4/2/2024 4:16 PM EDT  Workstation ID: [id number]    XR Abdomen KUB    Result Date: 4/2/2024  XR ABDOMEN KUB Date of Exam: 4/2/2024 3:59 PM EDT Indication: NG Comparison: None available. Findings: Nasogastric tube terminates proximally in the stomach with the proximal side port near the GE junction.     Impression: Nasogastric tube terminates proximally in the stomach with the proximal side port near the GE junction. Electronically Signed: Shaquille Avalos MD  4/2/2024 4:11 PM EDT  Workstation ID: [id number]    CT Angiogram Head w AI Analysis of  LVO    Result Date: 4/2/2024  CT ANGIOGRAM NECK, CT ANGIOGRAM HEAD W AI ANALYSIS OF LVO Date of Exam: 4/2/2024 3:31 PM EDT Indication: stroke workup/unresponsive. Comparison: 1/9/2024 Technique: CTA of the neck was performed before and after the uneventful intravenous administration of 115 cc Isovue-370. Reconstructed coronal and sagittal images were also obtained. In addition, a 3-D volume rendered image was created for interpretation. Automated exposure control and iterative reconstruction methods were used. Findings: CTA NECK: *Aortic arch: No aneurysm. No significant stenosis or occlusion of the major arch vessels. *Left carotid system: Minimal proximal atherosclerotic disease. No aneurysm, significant stenosis or occlusion. *Right carotid system: Minimal proximal atherosclerotic disease. No aneurysm, significant stenosis or occlusion. *Vertebrobasilar system: The vertebral arteries arise from their respective subclavian arteries. No aneurysm, significant stenosis or occlusion. *There is an endotracheal tube, tip between the thoracic inlet and yolie. There is a nasogastric tube present. CTA HEAD: *Anterior circulation: No aneurysm, significant stenosis or occlusion. *Posterior circulation: No aneurysm, significant stenosis or occlusion. *Anatomic variants: None of significance. *Venous sinuses: Patent.     1.No hemodynamically significant stenosis, large vessel cut or aneurysms in intracranial circulation 2.No hemodynamically significant stenosis, dissection or aneurysms in extracranial circulation. Electronically Signed: Shlomo Bal MD  4/2/2024 4:07 PM EDT  Workstation ID: [id number]    CT Angiogram Neck    Result Date: 4/2/2024  CT ANGIOGRAM NECK, CT ANGIOGRAM HEAD W AI ANALYSIS OF LVO Date of Exam: 4/2/2024 3:31 PM EDT Indication: stroke workup/unresponsive. Comparison: 1/9/2024 Technique: CTA of the neck was performed before and after the uneventful intravenous administration of 115 cc Isovue-370.  Reconstructed coronal and sagittal images were also obtained. In addition, a 3-D volume rendered image was created for interpretation. Automated exposure control and iterative reconstruction methods were used. Findings: CTA NECK: *Aortic arch: No aneurysm. No significant stenosis or occlusion of the major arch vessels. *Left carotid system: Minimal proximal atherosclerotic disease. No aneurysm, significant stenosis or occlusion. *Right carotid system: Minimal proximal atherosclerotic disease. No aneurysm, significant stenosis or occlusion. *Vertebrobasilar system: The vertebral arteries arise from their respective subclavian arteries. No aneurysm, significant stenosis or occlusion. *There is an endotracheal tube, tip between the thoracic inlet and yolie. There is a nasogastric tube present. CTA HEAD: *Anterior circulation: No aneurysm, significant stenosis or occlusion. *Posterior circulation: No aneurysm, significant stenosis or occlusion. *Anatomic variants: None of significance. *Venous sinuses: Patent.     1.No hemodynamically significant stenosis, large vessel cut or aneurysms in intracranial circulation 2.No hemodynamically significant stenosis, dissection or aneurysms in extracranial circulation. Electronically Signed: Shlomo Bal MD  4/2/2024 4:07 PM EDT  Workstation ID: [id number]    CT CEREBRAL PERFUSION WITH & WITHOUT CONTRAST    Result Date: 4/2/2024  CT CEREBRAL PERFUSION W WO CONTRAST Date of Exam: 4/2/2024 3:31 PM EDT Indication: Neuro deficit, acute, stroke suspected stroke workup/unresponsive.  Comparison: 1/9/2024 Technique: Axial CT images of the brain were obtained prior to and after the administration of 115 cc Isovue-370. Core blood volume, core blood flow, mean transit time, and Tmax images were obtained utilizing the Rapid software protocol. A limited CT angiogram of the head was also performed to measure the blood vessel density. The radiation dose reduction device was turned on for each  scan per the ALARA (As Low as Reasonably Achievable) protocol. Findings:    Cerebral blood flow, blood volume, and mean transit time maps are symmetric without large perfusion defect.  CBF<30% volume: 0mL Tmax>6sec volume: 0 mL Mismatch volume: 0mL Mismatch ratio: None          1. No evidence of core infarct nor ischemic brain at risk. Electronically Signed: Shlomo Bal MD  4/2/2024 3:58 PM EDT  Workstation ID: [id number]    CT Head Without Contrast Stroke Protocol    Result Date: 4/2/2024  CT HEAD WO CONTRAST STROKE PROTOCOL Date of Exam: 4/2/2024 3:19 PM EDT Indication: ams. Comparison: Head CT 1/9/2024 Technique: Axial CT images were obtained of the head without contrast administration.  Reconstructed coronal images were also obtained. Automated exposure control and iterative construction methods were used. Scan Time: 3:26 p.m. 4/2/2024 Results discussed with Dr. Garcia by Dr. Mario Oropeza in person at the CT scanner at 3:28 p.m 4/2/2024. Findings: No acute intracranial hemorrhage. No acute large territory infarct. No extra-axial collections. No mass effect. Normal size and configuration of the ventricles. Unremarkable appearance of the orbits. Paranasal sinuses and mastoid air cells are clear. Partial visualization of nasogastric tube; endotracheal tube is noted on the  image.     Impression: No acute intracranial findings. Electronically Signed: Mario Oropeza MD  4/2/2024 3:33 PM EDT  Workstation ID: [id number]     Results for orders placed during the hospital encounter of 12/10/21    Doppler Arterial Multi Level Lower Extremity - Bilateral CAR    Interpretation Summary  · Right Conclusion: The right SUBHASH is normal.  · Left Conclusion: The left SUBHASH is normal.      Results for orders placed during the hospital encounter of 12/10/21    Doppler Arterial Multi Level Lower Extremity - Bilateral CAR    Interpretation Summary  · Right Conclusion: The right SUBHASH is normal.  · Left Conclusion: The left SUBHASH is  normal.      Results for orders placed during the hospital encounter of 04/02/24    Adult Transthoracic Echo Complete W/ Cont if Necessary Per Protocol    Interpretation Summary    Left ventricular systolic function is normal. Calculated left ventricular EF = 62.8%    Insufficient TR velocity profile to estimate the right ventricular systolic pressure.    Normal left atrial size and volume noted.      Plan for Follow-up of Pending Labs/Results: none    Discharge Details        Discharge Medications        New Medications        Instructions Start Date   amoxicillin-clavulanate 875-125 MG per tablet  Commonly known as: AUGMENTIN   1 tablet, Oral, Every 12 Hours Scheduled             Changes to Medications        Instructions Start Date   amLODIPine 10 MG tablet  Commonly known as: NORVASC  What changed:   medication strength  how much to take   10 mg, Oral, Daily   Start Date: April 9, 2024     topiramate 25 MG tablet  Commonly known as: TOPAMAX  What changed:   how much to take  how to take this  when to take this   TAKE ONE TABLET BY MOUTH ONCE NIGHTLY FOR 7 DAYS THEN TAKE 1 TABLET BY MOUTH TWICE A DAY FOR 7 DAYS THEN TAKE ONE TABLET BY MOUTH EVERY MORNING AND 2 TABLETS AT NIGHT FOR 7 DAYS             Continue These Medications        Instructions Start Date   aspirin 81 MG EC tablet   81 mg, Oral, Daily      atorvastatin 40 MG tablet  Commonly known as: LIPITOR   40 mg, Oral, Daily      benzonatate 200 MG capsule  Commonly known as: TESSALON   200 mg, Oral, 3 Times Daily PRN      Budeson-Glycopyrrol-Formoterol 160-9-4.8 MCG/ACT aerosol inhaler  Commonly known as: BREZTRI   2 puffs, Inhalation, 2 Times Daily      carvedilol 12.5 MG tablet  Commonly known as: COREG   12.5 mg, Oral, 2 Times Daily With Meals      FLUoxetine 20 MG capsule  Commonly known as: PROzac   20 mg, Oral, Daily      lactulose 10 GM/15ML solution  Commonly known as: CHRONULAC   Take 45 mL by mouth 2 (Two) Times a Day.      levETIRAcetam 100  MG/ML solution  Commonly known as: Keppra   500 mg, Oral, 2 Times Daily      linaclotide 145 MCG capsule capsule  Commonly known as: Linzess   145 mcg, Oral, Every Morning Before Breakfast      mirtazapine 7.5 MG tablet  Commonly known as: REMERON   15 mg, Oral, Nightly      pantoprazole 40 MG EC tablet  Commonly known as: PROTONIX   40 mg, Oral, Daily      rizatriptan 10 MG tablet  Commonly known as: Maxalt   Take 1 tablet at onset of headache.  May repeat once in 2 hours.  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    Propofol Other (See Comments)     Propofol infusion reaction, weakness, tremors, paralysis         Discharge Disposition:  Home or Self Care    Diet:  Hospital:  Diet Order   Procedures    Diet: Cardiac; Healthy Heart (2-3 Na+); Texture: Regular (IDDSI 7); Fluid Consistency: Thin (IDDSI 0)            Activity: as tolerated      Restrictions or Other Recommendations:  none       CODE STATUS:    Code Status and Medical Interventions:   Ordered at: 04/05/24 1324     Code Status (Patient has no pulse and is not breathing):    CPR (Attempt to Resuscitate)     Medical Interventions (Patient has pulse or is breathing):    Full Support       Future Appointments   Date Time Provider Department Center   10/21/2024  9:00 AM Mary Lr APRN [facility]       Additional Instructions for the Follow-ups that You Need to Schedule       Discharge Follow-up with PCP   As directed       Currently Documented PCP:    Coby Ely PA-C    PCP Phone Number:    [phone]     Follow Up Details: within 1 week                      Maira Yates DO  04/08/24      Time Spent on Discharge:  I spent   35  minutes on this discharge activity which included: face-to-face encounter with the patient, reviewing the data in the system, coordination of the care with the nursing staff as well as consultants, documentation, and entering orders.

## 2024-04-08 NOTE — OUTREACH NOTE
Prep Survey      Flowsheet Row Responses   [facility] facility patient discharged from? [facility]   Is LACE score < 7 ? No   Eligibility [facility]   Date of Admission 04/02/24   Date of Discharge 04/08/24   Discharge Disposition Home or Self Care   Discharge diagnosis Encephalopathy   Does the patient have one of the following disease processes/diagnoses(primary or secondary)? Other   Does the patient have Home health ordered? Yes   What is the Home health agency?  [facility]    Is there a DME ordered? No   Prep survey completed? Yes            AGATA CHASE - Registered Nurse

## 2024-04-08 NOTE — PLAN OF CARE
Goal Outcome Evaluation: [facility] room air pt will be discharged today. Education will be done with pt

## 2024-04-08 NOTE — PROGRESS NOTES
Neurology       Patient Care Team:  Coby Ely PA-C as PCP - General (Physician Assistant)  Carolina Tello MD as Consulting Physician (Neurology)  Elliott Hunt MD as Consulting Physician (Gastroenterology)  Ryder Delarosa III, MD as Cardiologist (Cardiology)  Mary Canales APRN as Nurse Practitioner (Psychiatry)    Chief complaint: Hypertensive crisis    History: Patient is awake and alert.    She has had no further seizures.    She is off of the Keppra.      Past Medical History:   Diagnosis Date    Abnormal Pap smear of cervix     Allergic rhinitis     Asthma     Atypical chest pain     Brain tumor     Status post brain tumor resection in 1989.    Candidiasis of mouth     Cervical high risk HPV (human papillomavirus) test positive 09/01/2020    CKD (chronic kidney disease), stage III     Colon polyps 09/09/2020    sessile and tubular adenoma    Conversion disorder     COPD (chronic obstructive pulmonary disease)     COVID-19     Elevated liver enzymes     Former smoker 07/05/2023    H/O Helicobacter infection      Status post EGD 9/4/2012, pathology revealed H. pylori infection.    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     Neurology evaluation thought it was numbness from carpal tunnel and not Guillain-Indialantic    Hyperlipidemia     Hypertension     Influenza     Internal hemorrhoids     Low grade squamous intraepith lesion on cytologic smear cervix (lgsil) 09/01/2020    Median neuropathy     Migraine     PTSD (post-traumatic stress disorder)     Seizures     2 seizures after surgery    Small fiber neuropathy     Stroke     Tinea corporis        Vital Signs   Vitals:    04/08/24 0500 04/08/24 0731 04/08/24 0825 04/08/24 0916   BP: 145/83 166/93 166/93    BP Location: Right arm Right arm     Patient Position:  Lying     Pulse: 62 58 63 63   Resp: 16 16  17   Temp: 98.7 °F (37.1 °C) 98.8 °F (37.1 °C)     TempSrc: Oral Oral     SpO2: 95%    96%   Weight: 69.3 kg (152 lb 12.8 oz)      Height:           Physical Exam:   General: Awake and alert blood pressure continues to fluctuate.  Alternating between the high 120s and the high 180s.                  Neuro: Oriented to person place and time.    She has a good memory of her previous event life.    She is not sure she did not mess up her medications.        Results Review:  EEG and MRI were unremarkable  Results from last 7 days   Lab Units 04/06/24  0405   WBC 10*3/mm3 6.69   HEMOGLOBIN g/dL 11.5*   HEMATOCRIT % 33.6*   PLATELETS 10*3/mm3 130*     Results from last 7 days   Lab Units 04/07/24  0720 04/06/24  0405 04/05/24  0352 04/03/24  0432 04/02/24  1507   SODIUM mmol/L 141 140 141   < > 136   POTASSIUM mmol/L 3.8 3.7 3.9   < > 3.8   CHLORIDE mmol/L 106 108* 111*   < > 104   CO2 mmol/L 27.0 24.0 24.0   < > 23.0   BUN mg/dL 12 17 25*   < > 15   CREATININE mg/dL 0.75 0.78 0.81   < > 0.82   CALCIUM mg/dL 8.8 8.2* 8.3*   < > 9.3   BILIRUBIN mg/dL  --   --   --   --  0.3   ALK PHOS U/L  --   --   --   --  106   ALT (SGPT) U/L  --   --   --   --  11   AST (SGOT) U/L  --   --   --   --  13   GLUCOSE mg/dL 105* 120* 83   < > 104*    < > = values in this interval not displayed.       Imaging Results (Last 24 Hours)       ** No results found for the last 24 hours. **            Assessment:  Hypertensive encephalopathy with a loss of consciousness, likely seizure.        Plan:  Okay to discharge with outpatient follow-up [facility] in 2 months.    Primary care to manage blood pressure    Comment:  Said that she have her pharmacy load the pills on a pill minder to help her.    Nothing to suggest epilepsy on her workup.         I discussed the patients findings and my recommendations with patient and nursing staff    Fredy Montague MD  04/08/24  12:08 EDT

## 2024-04-09 ENCOUNTER — TRANSITIONAL CARE MANAGEMENT TELEPHONE ENCOUNTER (OUTPATIENT)
Dept: CALL CENTER | Facility: HOSPITAL | Age: 54
End: 2024-04-09
Payer: MEDICARE

## 2024-04-09 NOTE — OUTREACH NOTE
Call Center TCM Note      Flowsheet Row Responses   [facility] patient discharged from? [facility]   Does the patient have one of the following disease processes/diagnoses(primary or secondary)? Other   TCM attempt successful? No   Unsuccessful attempts Attempt 1   Revoked Reason Other  [cannot schedule with DANIEL Perez due to insurance not covered ([employer])]   Comments Patient unable to see  PCP due to insurance with [email or web address]   Does the patient have an appointment with their PCP within 7-14 days of discharge? No            Sveta Putnam RN    4/9/2024, 09:53 EDT

## 2024-04-09 NOTE — PAYOR COMM NOTE
"Auth# [id number]   4/8/24 Discharged    Utilization Review  Phone [phone]  Fax [phone]    [facility]  [address] [place] [address]           Arcelia Quintero (53 y.o. Female)       Date of Birth   1970    Social Security Number       Address   [address] [place] [address]    Home Phone   [phone]    MRN   [id number]       [employer]   Synagogue    Marital Status   Legally                             Admission Date   4/2/24    Admission Type   Emergency    Admitting Provider   Maira Yates DO    Attending Provider       Department, Room/Bed   [facility] 4H, S480/1       Discharge Date   4/8/2024    Discharge Disposition   Home or Self Care    Discharge Destination                                 Attending Provider: (none)   Allergies: Codeine, Influenza Vaccines, Propofol    Isolation: None   Infection: None   Code Status: Prior    Ht: 170.2 cm (67.01\")   Wt: 69.3 kg (152 lb 12.8 oz)    Admission Cmt: None   Principal Problem: Encephalopathy acute [G93.40]                   Active Insurance as of 4/2/2024       Primary Coverage       Payor Plan Insurance Group Employer/Plan Group    [facility] WELL[employer] MED ADV SNP HMO        Payor Plan Address Payor Plan Phone Number Payor Plan Fax Number Effective Dates    [address] [address]   12/1/2023 - None Entered    [facility] [phone]         Subscriber Name Subscriber Birth Date Member ID       ARCELIA QUINTERO 1970 [id number]               Secondary Coverage       Payor Plan Insurance Group Employer/Plan Group    [facility]        Payor Plan Address Payor Plan Phone Number Payor Plan Fax Number Effective Dates    [address] [address] [phone]  8/3/2023 - None Entered    [facility] [id number]         Subscriber Name Subscriber Birth Date Member ID       ARCELIA QUINTERO 1970 [id number]                     Emergency Contacts        " (Rel.) Home Phone Work Phone Mobile Phone    CODY GIL (Daughter) -- -- [phone]    Lainey Gil (Daughter) [phone] -- --    Beverley Gil (Daughter) -- -- [phone]                 Discharge Summary        Maira Yates DO at 24 1145              [facility] Medicine Services  DISCHARGE SUMMARY    Patient Name: Arcelia Quintero  : 1970  MRN: [id number]    Date of Admission: 2024  2:52 PM  Date of Discharge:  2024  Primary Care Physician: Coby Ely PA-C    Consults       Date and Time Order Name Status Description    2024  9:14 PM Inpatient Neurology Consult General Completed             Hospital Course     Presenting Problem: unresponsive    Active Hospital Problems    Diagnosis  POA    CKD (chronic kidney disease), stage III [N18.30]  Yes    H/O PNES [Z87.898]  Yes    Emphysema/COPD [J43.9]  Yes    Anxiety and depression [F41.9, F32.A]  Yes    Dyslipidemia [E78.5]  Yes    GERD (gastroesophageal reflux disease) [K21.9]  Yes      Resolved Hospital Problems    Diagnosis Date Resolved POA    **Encephalopathy acute [G93.40] 2024 Yes    Acute respiratory failure with hypercapnia [J96.02] 2024 Yes    Hypertensive emergency [I16.1] 2024 Yes          Hospital Course:  Arcelia Quintero is a 53 y.o. female who presented to  emergency department on 2024 with suspected acute CVA with symptoms of encephalopathy.  Her blood pressure was noted to be greater than 220 systolic and she was intubated.  She was cared for in the intensive care unit.  MRI of the brain was negative.  Neurology was consulted and suspected malignant hypertension.  She was extubated on the morning of 2024.  She was transferred out of the ICU and the hospital service assumed care on 2024.    This patient's problems and plans were partially entered by my partner and updated as appropriate by me 24.     Malignant  hypertension with headache  Initial concern for suspected CVA, MRI negative  Possible seizure per neurology  -Continue Coreg, Norvasc- increase to 10mg PO daily, Lipitor, aspirin  -No antiepileptic meds, EEG was normal on 4/3/2024  -prn Excedrin for headache     Respiratory failure with hypercapnia  Intubated on 4/2/2024, extubated on 4/4/2024  COPD/emphysema  -Continue to wean O2 as tolerated, currently on 2 L nasal cannula  -Continue Augmentin as started by pulmonary empirically     NG tube was discontinued while in the ICU  -Cardiac diet/thin liquids     Elevated creatinine  -Creatinine noted to be up to 1.25, now back to normal (0.75)     Dyslipidemia  -Continue statin     GERD  -Continue Protonix     Anxiety/depression  -Continue Prozac     Weakness/debility  -- patient does not want to go to rehab- wants to go home    Discharge Follow Up Recommendations for outpatient labs/diagnostics:   - finish Augmentin regimen as prescribed  - increase Amlodipine from 5mg PO daily to 10mg  - continue previous dose of Carvedilol, aspirin and Lipitor  - as needed Excedrin over the counter for migraine  - f/u with PCP in 1 week    Day of Discharge     HPI:   Patient resting in bed, headache improved. Ready to go home.    Review of Systems  Gen- No fevers, chills  CV- No chest pain, palpitations  Resp- No cough, dyspnea  GI- No N/V/D, abd pain    Vital Signs:   Temp:  [98 °F (36.7 °C)-98.8 °F (37.1 °C)] 98.8 °F (37.1 °C)  Heart Rate:  [56-64] 63  Resp:  [16-18] 17  BP: (145-189)/(81-95) 166/93  Flow (L/min):  [2] 2      Physical Exam:  Constitutional: No acute distress, awake, alert  HENT: NCAT, mucous membranes moist  Respiratory: decreased to auscultation bilaterally, respiratory effort normal on RA  Cardiovascular: RRR, no murmurs, rubs, or gallops  Gastrointestinal: Positive bowel sounds, soft, nontender, nondistended  Musculoskeletal: No bilateral ankle edema  Psychiatric: Appropriate affect, cooperative  Neurologic:  Oriented x 3, LONG, speech clear  Skin: No rashes    Pertinent  and/or Most Recent Results     LAB RESULTS:      Lab 04/06/24  0405 04/05/24  0352 04/04/24  0351 04/03/24  0432 04/02/24  1510 04/02/24  1501 04/02/24  1500   WBC 6.69 7.08 12.79* 12.15* 6.33  --   --    HEMOGLOBIN 11.5* 11.4* 13.3 15.0 15.3  --   --    HEMOGLOBIN, POC  --   --   --   --   --    < >  --    HEMATOCRIT 33.6* 35.1 41.1 45.3 44.5  --   --    HEMATOCRIT POC  --   --   --   --   --    < >  --    PLATELETS 130* 116* 166 178 186  --   --    NEUTROS ABS  --   --   --   --  2.80  --   --    IMMATURE GRANS (ABS)  --   --   --   --  0.01  --   --    LYMPHS ABS  --   --   --   --  2.67  --   --    MONOS ABS  --   --   --   --  0.59  --   --    EOS ABS  --   --   --   --  0.21  --   --    MCV 90.1 94.9 94.9 91.1 90.1  --   --    PROCALCITONIN  --   --   --   --  0.04  --   --    LACTATE  --   --   --   --  1.2  --   --    PROTIME  --   --   --   --   --   --  15.6*    < > = values in this interval not displayed.         Lab 04/07/24  0720 04/06/24  0405 04/05/24  0352 04/04/24  0351 04/03/24  1717 04/03/24  0432 04/02/24  1510   SODIUM 141 140 141 132* 143 139  --    POTASSIUM 3.8 3.7 3.9 4.0 4.1 4.4  --    CHLORIDE 106 108* 111* 103 110* 105  --    CO2 27.0 24.0 24.0 22.0 24.0 21.0*  --    ANION GAP 8.0 8.0 6.0 7.0 9.0 13.0  --    BUN 12 17 25* 24* 24* 20  --    CREATININE 0.75 0.78 0.81 0.74 1.05* 1.25*  --    EGFR 95.9 91.5 87.5 97.5 64.1 52.0*  --    GLUCOSE 105* 120* 83 132* 122* 120*  --    CALCIUM 8.8 8.2* 8.3* 8.6 8.6 8.8  --    MAGNESIUM  --  1.7 2.0 2.4 1.9 2.0  --    PHOSPHORUS  --  2.3* 2.5 2.6 3.6 5.5*  --    TSH  --   --   --   --   --   --  1.850         Lab 04/02/24  1510 04/02/24  1507   TOTAL PROTEIN  --  6.9   ALBUMIN  --  4.2   GLOBULIN  --  2.7   ALT (SGPT)  --  11   AST (SGOT)  --  13   BILIRUBIN  --  0.3   ALK PHOS  --  106   LIPASE 43  --          Lab 04/03/24  1956 04/03/24  1717 04/02/24  1507 04/02/24  1500   PROBNP  --   2,303.0*  --   --    HSTROP T 89* 100* <6  --    PROTIME  --   --   --  15.6*   INR  --   --   --  1.3*                 Lab 04/04/24  0334 04/03/24  1208 04/02/24  1635   PH, ARTERIAL 7.350 7.303* 7.258*   PCO2, ARTERIAL 41.9 47.3* 49.3*   PO2 ART 83.0 75.7* 228.0*   FIO2 40 45 100   HCO3 ART 23.1 23.4 22.0   BASE EXCESS ART -2.4* -3.3* -5.6*   CARBOXYHEMOGLOBIN 1.1 1.3 3.2*     Brief Urine Lab Results  (Last result in the past 365 days)        Color   Clarity   Blood   Leuk Est   Nitrite   Protein   CREAT   Urine HCG        04/06/24 0609 Yellow   Clear   Negative   Negative   Negative   Negative                 Microbiology Results (last 10 days)       Procedure Component Value - Date/Time    MRSA Screen, PCR (Inpatient) - Swab, Nares [[id number]]  (Normal) Collected: 04/05/24 1134    Lab Status: Final result Specimen: Swab from Nares Updated: 04/05/24 1258     MRSA PCR Negative    Narrative:      The negative predictive value of this diagnostic test is high and should only be used to consider de-escalating anti-MRSA therapy. A positive result may indicate colonization with MRSA and must be correlated clinically.  MRSA Negative    Respiratory Culture - Aspirate, ET Suction [[id number]]  (Abnormal)  (Susceptibility) Collected: 04/03/24 1031    Lab Status: Final result Specimen: Aspirate from ET Suction Updated: 04/06/24 0929     Respiratory Culture Heavy growth (4+) Streptococcus pneumoniae      Scant growth (1+) Normal Respiratory Amaya     Gram Stain Many (4+) WBCs per low power field      Many (4+) Gram positive cocci in pairs and chains      Rare (1+) Epithelial cells per low power field      Few (2+) Gram negative bacilli    Narrative:            Susceptibility        Streptococcus pneumoniae      KIMMY      Ceftriaxone (Meningitis) Susceptible      Ceftriaxone (Non-meningitis) Susceptible      Levofloxacin Susceptible      Penicillin (Meningitis) Susceptible                           Blood Culture - Blood, Arm,  Left [[id number]]  (Normal) Collected: 04/03/24 0940    Lab Status: Final result Specimen: Blood from Arm, Left Updated: 04/08/24 1015     Blood Culture No growth at 5 days    Narrative:      Less than seven (7) mL's of blood was collected.  Insufficient quantity may yield false negative results.    Blood Culture - Blood, Hand, Left [[id number]]  (Normal) Collected: 04/03/24 0930    Lab Status: Final result Specimen: Blood from Hand, Left Updated: 04/08/24 1015     Blood Culture No growth at 5 days    Narrative:      Less than seven (7) mL's of blood was collected.  Insufficient quantity may yield false negative results.    Respiratory Panel PCR w/COVID-19(SARS-CoV-2) YENY/SHALOM/KIMMIE/PAD/COR/LUKAS In-House, NP Swab in UTM/VTM, 2 HR TAT - Swab, Nasopharynx [[id number]]  (Normal) Collected: 04/03/24 0840    Lab Status: Final result Specimen: Swab from Nasopharynx Updated: 04/03/24 1124     ADENOVIRUS, PCR Not Detected     Coronavirus 229E Not Detected     Coronavirus HKU1 Not Detected     Coronavirus NL63 Not Detected     Coronavirus OC43 Not Detected     COVID19 Not Detected     Human Metapneumovirus Not Detected     Human Rhinovirus/Enterovirus Not Detected     Influenza A PCR Not Detected     Influenza B PCR Not Detected     Parainfluenza Virus 1 Not Detected     Parainfluenza Virus 2 Not Detected     Parainfluenza Virus 3 Not Detected     Parainfluenza Virus 4 Not Detected     RSV, PCR Not Detected     Bordetella pertussis pcr Not Detected     Bordetella parapertussis PCR Not Detected     Chlamydophila pneumoniae PCR Not Detected     Mycoplasma pneumo by PCR Not Detected    Narrative:      In the setting of a positive respiratory panel with a viral infection PLUS a negative procalcitonin without other underlying concern for bacterial infection, consider observing off antibiotics or discontinuation of antibiotics and continue supportive care. If the respiratory panel is positive for atypical bacterial infection (Bordetella  pertussis, Chlamydophila pneumoniae, or Mycoplasma pneumoniae), consider antibiotic de-escalation to target atypical bacterial infection.            SLP FEES - Fiberoptic Endo Eval Swallow    Result Date: 4/5/2024  This procedure was auto-finalized with no dictation required.    Adult Transthoracic Echo Complete W/ Cont if Necessary Per Protocol    Result Date: 4/4/2024    Left ventricular systolic function is normal. Calculated left ventricular EF = 62.8%   Insufficient TR velocity profile to estimate the right ventricular systolic pressure.   Normal left atrial size and volume noted.     EEG    Result Date: 4/4/2024  Electroencephalogram Report Referring: Fredy Montague MD Indications: Altered mental state,?  Seizure EEG Description With the patient done portable in the ICU the background rhythm is 9 to 10/s with amplitudes of 15 to 20 µV. Drowsiness is notable for 5 to 6/s waves of 20 to 30 µV. No sleep was noted. Patient has a great deal of eye movement and movement in general. No epileptic activity is identified. No focal changes are seen. Classification Essentially normal EEG Interpretation This gives the appearance of a patient with sedation with no baseline epileptic disorder. Fredy Montague MD 04/04/24 10:28 EDT     XR Chest 1 View    Result Date: 4/4/2024  XR CHEST 1 VW Date of Exam: 4/4/2024 3:18 AM EDT Indication: intubated Comparison 4/2/2024 Findings: ET tube is unchanged in position. NG tube again noted although tip not included. Heart and pulmonary vessels appear within normal limits. Lung fields are well inflated and clear of acute infiltrates or effusions.     Impression: Unchanged support lines. No acute process identified in the lung fields. Electronically Signed: Linda Gomez MD  4/4/2024 7:28 AM EDT  Workstation ID: [id number]    MRI Brain Without Contrast    Result Date: 4/3/2024  MRI BRAIN WO CONTRAST Date of Exam: 4/3/2024 2:30 AM EDT Indication: Stroke, follow up  unresponsiveness.  Comparison: 4/2/2024. Technique:  Routine multiplanar/multisequence sequence images of the brain were obtained without contrast administration. Findings: There is no diffusion restriction to suggest acute infarct. There is no evidence of acute or chronic intracranial hemorrhage. No mass effect or midline shift. No abnormal extra-axial collections. The major vascular flow voids appear intact. The mild periventricular and subcortical FLAIR signal changes are present. Basal ganglia, brainstem and cerebellum appear within normal limits. Calvarial and superficial soft tissue signal is within normal limits. Orbits appear unremarkable. The paranasal sinuses  and the mastoid air cells appear well aerated. Midline structures are intact.     Impression: 1. No evidence of hemorrhage, mass effect or midline shift. No evidence of recent or acute ischemia. 2. Mild periventricular and subcortical FLAIR signal changes likely related to chronic microvascular ischemic change. Electronically Signed: Shanthi Dai MD  4/3/2024 3:04 AM EDT  Workstation ID: [id number]    XR Chest 1 View    Result Date: 4/2/2024  XR CHEST 1 VW Date of Exam: 4/2/2024 4:08 PM EDT Indication: Weak/Dizzy/AMS triage protocol Comparison: 1/9/2024 Findings: ET tube is seen in good position midway between the clavicles and yolie. NG tube is seen passing below the left hemidiaphragm. The heart mediastinum and pulmonary vasculature appear within normal limits. Lungs appear well expanded and clear bilaterally.     Impression: 1. ET tube and NG tube appear to be in satisfactory position. 2. No evidence of active chest disease is seen. Electronically Signed: Jonathan Gonzalez MD  4/2/2024 4:16 PM EDT  Workstation ID: [id number]    XR Abdomen KUB    Result Date: 4/2/2024  XR ABDOMEN KUB Date of Exam: 4/2/2024 3:59 PM EDT Indication: NG Comparison: None available. Findings: Nasogastric tube terminates proximally in the stomach with the proximal side port near  the GE junction.     Impression: Nasogastric tube terminates proximally in the stomach with the proximal side port near the GE junction. Electronically Signed: Shaquille Avalos MD  4/2/2024 4:11 PM EDT  Workstation ID: [id number]    CT Angiogram Head w AI Analysis of LVO    Result Date: 4/2/2024  CT ANGIOGRAM NECK, CT ANGIOGRAM HEAD W AI ANALYSIS OF LVO Date of Exam: 4/2/2024 3:31 PM EDT Indication: stroke workup/unresponsive. Comparison: 1/9/2024 Technique: CTA of the neck was performed before and after the uneventful intravenous administration of 115 cc Isovue-370. Reconstructed coronal and sagittal images were also obtained. In addition, a 3-D volume rendered image was created for interpretation. Automated exposure control and iterative reconstruction methods were used. Findings: CTA NECK: *Aortic arch: No aneurysm. No significant stenosis or occlusion of the major arch vessels. *Left carotid system: Minimal proximal atherosclerotic disease. No aneurysm, significant stenosis or occlusion. *Right carotid system: Minimal proximal atherosclerotic disease. No aneurysm, significant stenosis or occlusion. *Vertebrobasilar system: The vertebral arteries arise from their respective subclavian arteries. No aneurysm, significant stenosis or occlusion. *There is an endotracheal tube, tip between the thoracic inlet and yolie. There is a nasogastric tube present. CTA HEAD: *Anterior circulation: No aneurysm, significant stenosis or occlusion. *Posterior circulation: No aneurysm, significant stenosis or occlusion. *Anatomic variants: None of significance. *Venous sinuses: Patent.     1.No hemodynamically significant stenosis, large vessel cut or aneurysms in intracranial circulation 2.No hemodynamically significant stenosis, dissection or aneurysms in extracranial circulation. Electronically Signed: Shlomo Bal MD  4/2/2024 4:07 PM EDT  Workstation ID: [id number]    CT Angiogram Neck    Result Date: 4/2/2024  CT ANGIOGRAM NECK,  CT ANGIOGRAM HEAD W AI ANALYSIS OF LVO Date of Exam: 4/2/2024 3:31 PM EDT Indication: stroke workup/unresponsive. Comparison: 1/9/2024 Technique: CTA of the neck was performed before and after the uneventful intravenous administration of 115 cc Isovue-370. Reconstructed coronal and sagittal images were also obtained. In addition, a 3-D volume rendered image was created for interpretation. Automated exposure control and iterative reconstruction methods were used. Findings: CTA NECK: *Aortic arch: No aneurysm. No significant stenosis or occlusion of the major arch vessels. *Left carotid system: Minimal proximal atherosclerotic disease. No aneurysm, significant stenosis or occlusion. *Right carotid system: Minimal proximal atherosclerotic disease. No aneurysm, significant stenosis or occlusion. *Vertebrobasilar system: The vertebral arteries arise from their respective subclavian arteries. No aneurysm, significant stenosis or occlusion. *There is an endotracheal tube, tip between the thoracic inlet and yolie. There is a nasogastric tube present. CTA HEAD: *Anterior circulation: No aneurysm, significant stenosis or occlusion. *Posterior circulation: No aneurysm, significant stenosis or occlusion. *Anatomic variants: None of significance. *Venous sinuses: Patent.     1.No hemodynamically significant stenosis, large vessel cut or aneurysms in intracranial circulation 2.No hemodynamically significant stenosis, dissection or aneurysms in extracranial circulation. Electronically Signed: Shlomo Bal MD  4/2/2024 4:07 PM EDT  Workstation ID: [id number]    CT CEREBRAL PERFUSION WITH & WITHOUT CONTRAST    Result Date: 4/2/2024  CT CEREBRAL PERFUSION W WO CONTRAST Date of Exam: 4/2/2024 3:31 PM EDT Indication: Neuro deficit, acute, stroke suspected stroke workup/unresponsive.  Comparison: 1/9/2024 Technique: Axial CT images of the brain were obtained prior to and after the administration of 115 cc Isovue-370. Core blood volume,  core blood flow, mean transit time, and Tmax images were obtained utilizing the Rapid software protocol. A limited CT angiogram of the head was also performed to measure the blood vessel density. The radiation dose reduction device was turned on for each scan per the ALARA (As Low as Reasonably Achievable) protocol. Findings:    Cerebral blood flow, blood volume, and mean transit time maps are symmetric without large perfusion defect.  CBF<30% volume: 0mL Tmax>6sec volume: 0 mL Mismatch volume: 0mL Mismatch ratio: None          1. No evidence of core infarct nor ischemic brain at risk. Electronically Signed: Shlomo Bal MD  4/2/2024 3:58 PM EDT  Workstation ID: [id number]    CT Head Without Contrast Stroke Protocol    Result Date: 4/2/2024  CT HEAD WO CONTRAST STROKE PROTOCOL Date of Exam: 4/2/2024 3:19 PM EDT Indication: ams. Comparison: Head CT 1/9/2024 Technique: Axial CT images were obtained of the head without contrast administration.  Reconstructed coronal images were also obtained. Automated exposure control and iterative construction methods were used. Scan Time: 3:26 p.m. 4/2/2024 Results discussed with Dr. Garcia by Dr. Mario Oropeza in person at the CT scanner at 3:28 p.m 4/2/2024. Findings: No acute intracranial hemorrhage. No acute large territory infarct. No extra-axial collections. No mass effect. Normal size and configuration of the ventricles. Unremarkable appearance of the orbits. Paranasal sinuses and mastoid air cells are clear. Partial visualization of nasogastric tube; endotracheal tube is noted on the  image.     Impression: No acute intracranial findings. Electronically Signed: Mario Oropeza MD  4/2/2024 3:33 PM EDT  Workstation ID: [id number]     Results for orders placed during the hospital encounter of 12/10/21    Doppler Arterial Multi Level Lower Extremity - Bilateral CAR    Interpretation Summary  · Right Conclusion: The right SUBHASH is normal.  · Left Conclusion: The left SUBHASH is  normal.      Results for orders placed during the hospital encounter of 12/10/21    Doppler Arterial Multi Level Lower Extremity - Bilateral CAR    Interpretation Summary  · Right Conclusion: The right SUBHASH is normal.  · Left Conclusion: The left SUBHASH is normal.      Results for orders placed during the hospital encounter of 04/02/24    Adult Transthoracic Echo Complete W/ Cont if Necessary Per Protocol    Interpretation Summary    Left ventricular systolic function is normal. Calculated left ventricular EF = 62.8%    Insufficient TR velocity profile to estimate the right ventricular systolic pressure.    Normal left atrial size and volume noted.      Plan for Follow-up of Pending Labs/Results: none    Discharge Details        Discharge Medications        New Medications        Instructions Start Date   amoxicillin-clavulanate 875-125 MG per tablet  Commonly known as: AUGMENTIN   1 tablet, Oral, Every 12 Hours Scheduled             Changes to Medications        Instructions Start Date   amLODIPine 10 MG tablet  Commonly known as: NORVASC  What changed:   medication strength  how much to take   10 mg, Oral, Daily   Start Date: April 9, 2024     topiramate 25 MG tablet  Commonly known as: TOPAMAX  What changed:   how much to take  how to take this  when to take this   TAKE ONE TABLET BY MOUTH ONCE NIGHTLY FOR 7 DAYS THEN TAKE 1 TABLET BY MOUTH TWICE A DAY FOR 7 DAYS THEN TAKE ONE TABLET BY MOUTH EVERY MORNING AND 2 TABLETS AT NIGHT FOR 7 DAYS             Continue These Medications        Instructions Start Date   aspirin 81 MG EC tablet   81 mg, Oral, Daily      atorvastatin 40 MG tablet  Commonly known as: LIPITOR   40 mg, Oral, Daily      benzonatate 200 MG capsule  Commonly known as: TESSALON   200 mg, Oral, 3 Times Daily PRN      Budeson-Glycopyrrol-Formoterol 160-9-4.8 MCG/ACT aerosol inhaler  Commonly known as: BREZTRI   2 puffs, Inhalation, 2 Times Daily      carvedilol 12.5 MG tablet  Commonly known as: COREG    12.5 mg, Oral, 2 Times Daily With Meals      FLUoxetine 20 MG capsule  Commonly known as: PROzac   20 mg, Oral, Daily      lactulose 10 GM/15ML solution  Commonly known as: CHRONULAC   Take 45 mL by mouth 2 (Two) Times a Day.      levETIRAcetam 100 MG/ML solution  Commonly known as: Keppra   500 mg, Oral, 2 Times Daily      linaclotide 145 MCG capsule capsule  Commonly known as: Linzess   145 mcg, Oral, Every Morning Before Breakfast      mirtazapine 7.5 MG tablet  Commonly known as: REMERON   15 mg, Oral, Nightly      pantoprazole 40 MG EC tablet  Commonly known as: PROTONIX   40 mg, Oral, Daily      rizatriptan 10 MG tablet  Commonly known as: Maxalt   Take 1 tablet at onset of headache.  May repeat once in 2 hours.  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    Propofol Other (See Comments)     Propofol infusion reaction, weakness, tremors, paralysis         Discharge Disposition:  Home or Self Care    Diet:  Hospital:  Diet Order   Procedures    Diet: Cardiac; Healthy Heart (2-3 Na+); Texture: Regular (IDDSI 7); Fluid Consistency: Thin (IDDSI 0)            Activity: as tolerated      Restrictions or Other Recommendations:  none       CODE STATUS:    Code Status and Medical Interventions:   Ordered at: 04/05/24 1324     Code Status (Patient has no pulse and is not breathing):    CPR (Attempt to Resuscitate)     Medical Interventions (Patient has pulse or is breathing):    Full Support       Future Appointments   Date Time Provider Department Center   10/21/2024  9:00 AM Mary Lr APRN [facility]       Additional Instructions for the Follow-ups that You Need to Schedule       Discharge Follow-up with  PCP   As directed       Currently Documented PCP:    Coby Ely PA-C    PCP Phone Number:    [phone]     Follow Up Details: within 1 week                      Brayden Villarreal DO  04/08/24      Time Spent on Discharge:  I spent  35  minutes on this discharge activity which included: face-to-face encounter with the patient, reviewing the data in the system, coordination of the care with the nursing staff as well as consultants, documentation, and entering orders.            Electronically signed by Brayden Villarreal DO at 04/08/24 1149       Discharge Order (From admission, onward)       Start     Ordered    04/08/24 1143  Discharge patient  Once        Expected Discharge Date: 04/08/24   Discharge Disposition: Home or Self Care   Physician of Record for Attribution - Please select from Treatment Team: BRAYDEN VILLARREAL [[id number]]   Review needed by CMO to determine Physician of Record: No   Please choose which facility the patient is currently admitted if they are being discharged to another facility or unit.: [facility]      Question Answer Comment   Physician of Record for Attribution - Please select from Treatment Team BRAYDEN VILLARREAL    Review needed by CMO to determine Physician of Record No    Please choose which facility the patient is currently admitted if they are being discharged to another facility or unit. [facility]        04/08/24 1141

## 2024-04-10 ENCOUNTER — TELEPHONE (OUTPATIENT)
Dept: FAMILY MEDICINE CLINIC | Facility: CLINIC | Age: 54
End: 2024-04-10
Payer: MEDICARE

## 2024-04-10 NOTE — TELEPHONE ENCOUNTER
HAJA FROM [facility] IS REQUESTING VERBALS TO CONTINUE NURSING VISIT.        PLEASE ADVISE

## 2024-04-11 ENCOUNTER — TELEPHONE (OUTPATIENT)
Dept: FAMILY MEDICINE CLINIC | Facility: CLINIC | Age: 54
End: 2024-04-11
Payer: MEDICARE

## 2024-04-11 NOTE — TELEPHONE ENCOUNTER
Ned from [facility] called to request a verbal for physical therapy orders 1 times a week for 4 weeks. He can be reached back at [phone].

## 2024-04-15 ENCOUNTER — TELEPHONE (OUTPATIENT)
Dept: FAMILY MEDICINE CLINIC | Facility: CLINIC | Age: 54
End: 2024-04-15

## 2024-04-15 NOTE — TELEPHONE ENCOUNTER
[facility] called to notify us that patient has a yeast infection, I told them that the patient would need to be seen in an appointment to determine treatment. I attempted to call patient but phone number we have is currently not available.

## 2024-04-19 ENCOUNTER — TELEPHONE (OUTPATIENT)
Dept: FAMILY MEDICINE CLINIC | Facility: CLINIC | Age: 54
End: 2024-04-19
Payer: MEDICARE

## 2024-04-19 NOTE — TELEPHONE ENCOUNTER
Provider: Coby Ely PA-C     Caller: SHANA ALMANZA - [facility]     Relationship to Patient: / HOME HEALTH     Phone Number: [phone]     Reason for Call: CASSIE RECEIVED A REFERRAL FOR SOCIAL WORK VISIT THIS WEEK. SHANA REPORTS NO ONE WAS AT HOME OR ANSWERED THE PHONE. SHE HAS BEEN CALLING AND TEXTING THE PATIENT AND CAREGIVER FOR THE LAST 3 DAYS WITHOUT ANY RESPONSE.    CASSIE HAS CHECKED WITH THE OFFICE AND THERE HAS NOT BEEN ANY REPORTS OF HOSPITALIZATION. SHANA WANTED TO REPORT THAT SHE WAS NOT ABLE TO MAKE A VISIT THIS WEEK.    THIS WILL BE MARKED AS A MISSED VISIT. SHE WILL NOT ASK FOR ANOTHER VERBAL UNTIL SHE HEARS FROM THE PATIENT OR CAREGIVER.

## 2024-05-25 DIAGNOSIS — K21.9 GASTROESOPHAGEAL REFLUX DISEASE, UNSPECIFIED WHETHER ESOPHAGITIS PRESENT: ICD-10-CM

## 2024-05-28 RX ORDER — PANTOPRAZOLE SODIUM 40 MG/1
40 TABLET, DELAYED RELEASE ORAL DAILY
Qty: 90 TABLET | Refills: 1 | Status: SHIPPED | OUTPATIENT
Start: 2024-05-28

## 2024-05-28 NOTE — TELEPHONE ENCOUNTER
Rx Refill Note  Requested Prescriptions     Pending Prescriptions Disp Refills    pantoprazole (PROTONIX) 40 MG EC tablet [Pharmacy Med Name: PANTOPRAZOLE SOD DR 40 MG TAB] 90 tablet 1     Sig: TAKE 1 TABLET BY MOUTH DAILY      Last office visit with prescribing clinician: 11/6/2023   Last telemedicine visit with prescribing clinician: Visit date not found   Next office visit with prescribing clinician: Visit date not found                         Would you like a call back once the refill request has been completed: [] Yes [] No    If the office needs to give you a call back, can they leave a voicemail: [] Yes [] No    Glendy Medina MA  05/28/24, 15:51 EDT

## 2024-05-29 ENCOUNTER — PATIENT ROUNDING (BHMG ONLY) (OUTPATIENT)
Dept: URGENT CARE | Facility: CLINIC | Age: 54
End: 2024-05-29
Payer: MEDICARE

## 2024-05-29 NOTE — ED NOTES
Thank you for letting us care for you in your recent visit to our urgent care center. We would love to hear about your experience with us. Was this the first time you have visited our location?    We’re always looking for ways to make our patients’ experiences even better. Do you have any recommendations on ways we may improve?     I appreciate you taking the time to respond. Please be on the lookout for a survey about your recent visit from [employer] via text or email. We would greatly appreciate if you could fill that out and turn it back in. We want your voice to be heard and we value your feedback.   Thank you for choosing [facility] for your healthcare needs.

## 2024-06-21 ENCOUNTER — PATIENT ROUNDING (BHMG ONLY) (OUTPATIENT)
Dept: URGENT CARE | Facility: CLINIC | Age: 54
End: 2024-06-21
Payer: MEDICARE

## 2024-08-01 ENCOUNTER — OFFICE VISIT (OUTPATIENT)
Dept: FAMILY MEDICINE CLINIC | Facility: CLINIC | Age: 54
End: 2024-08-01
Payer: MEDICARE

## 2024-08-01 ENCOUNTER — HOSPITAL ENCOUNTER (OUTPATIENT)
Dept: GENERAL RADIOLOGY | Facility: HOSPITAL | Age: 54
Discharge: HOME | End: 2024-08-01
Admitting: PHYSICIAN ASSISTANT
Payer: MEDICARE

## 2024-08-01 VITALS
OXYGEN SATURATION: 91 % | HEIGHT: 67 IN | DIASTOLIC BLOOD PRESSURE: 100 MMHG | BODY MASS INDEX: 22.88 KG/M2 | SYSTOLIC BLOOD PRESSURE: 164 MMHG | HEART RATE: 84 BPM | WEIGHT: 145.8 LBS

## 2024-08-01 DIAGNOSIS — B37.0 THRUSH: ICD-10-CM

## 2024-08-01 DIAGNOSIS — J44.1 COPD WITH EXACERBATION: ICD-10-CM

## 2024-08-01 DIAGNOSIS — R05.9 COUGH, UNSPECIFIED TYPE: Primary | ICD-10-CM

## 2024-08-01 DIAGNOSIS — R06.2 WHEEZING: ICD-10-CM

## 2024-08-01 DIAGNOSIS — J40 BRONCHITIS: Primary | ICD-10-CM

## 2024-08-01 DIAGNOSIS — I10 ESSENTIAL HYPERTENSION: ICD-10-CM

## 2024-08-01 DIAGNOSIS — R05.9 COUGH, UNSPECIFIED TYPE: ICD-10-CM

## 2024-08-01 LAB
EXPIRATION DATE: NORMAL
FLUAV AG UPPER RESP QL IA.RAPID: NOT DETECTED
FLUBV AG UPPER RESP QL IA.RAPID: NOT DETECTED
INTERNAL CONTROL: NORMAL
Lab: NORMAL
SARS-COV-2 AG UPPER RESP QL IA.RAPID: NOT DETECTED

## 2024-08-01 PROCEDURE — 99214 OFFICE O/P EST MOD 30 MIN: CPT | Performed by: PHYSICIAN ASSISTANT

## 2024-08-01 PROCEDURE — G2211 COMPLEX E/M VISIT ADD ON: HCPCS | Performed by: PHYSICIAN ASSISTANT

## 2024-08-01 PROCEDURE — 71046 X-RAY EXAM CHEST 2 VIEWS: CPT

## 2024-08-01 PROCEDURE — 1160F RVW MEDS BY RX/DR IN RCRD: CPT | Performed by: PHYSICIAN ASSISTANT

## 2024-08-01 PROCEDURE — 3080F DIAST BP >= 90 MM HG: CPT | Performed by: PHYSICIAN ASSISTANT

## 2024-08-01 PROCEDURE — 1159F MED LIST DOCD IN RCRD: CPT | Performed by: PHYSICIAN ASSISTANT

## 2024-08-01 PROCEDURE — 3077F SYST BP >= 140 MM HG: CPT | Performed by: PHYSICIAN ASSISTANT

## 2024-08-01 PROCEDURE — 1125F AMNT PAIN NOTED PAIN PRSNT: CPT | Performed by: PHYSICIAN ASSISTANT

## 2024-08-01 PROCEDURE — 87428 SARSCOV & INF VIR A&B AG IA: CPT | Performed by: PHYSICIAN ASSISTANT

## 2024-08-01 RX ORDER — CARVEDILOL 12.5 MG/1
12.5 TABLET ORAL 2 TIMES DAILY WITH MEALS
Qty: 180 TABLET | Refills: 0 | Status: SHIPPED | OUTPATIENT
Start: 2024-08-01

## 2024-08-01 RX ORDER — DOXYCYCLINE HYCLATE 100 MG/1
100 CAPSULE ORAL 2 TIMES DAILY
Qty: 20 CAPSULE | Refills: 0 | Status: SHIPPED | OUTPATIENT
Start: 2024-08-01 | End: 2024-08-11

## 2024-08-01 RX ORDER — ALBUTEROL SULFATE 90 UG/1
2 AEROSOL, METERED RESPIRATORY (INHALATION) EVERY 4 HOURS PRN
Qty: 18 G | Refills: 0 | Status: SHIPPED | OUTPATIENT
Start: 2024-08-01

## 2024-08-01 RX ORDER — AMLODIPINE BESYLATE 10 MG/1
10 TABLET ORAL DAILY
Qty: 90 TABLET | Refills: 0 | Status: SHIPPED | OUTPATIENT
Start: 2024-08-01

## 2024-08-01 RX ORDER — LOSARTAN POTASSIUM 50 MG/1
50 TABLET ORAL DAILY
Qty: 90 TABLET | Refills: 0 | Status: SHIPPED | OUTPATIENT
Start: 2024-08-01

## 2024-08-01 RX ORDER — METHYLPREDNISOLONE 4 MG/1
TABLET ORAL
Qty: 21 TABLET | Refills: 0 | Status: CANCELLED | OUTPATIENT
Start: 2024-08-01

## 2024-08-01 NOTE — PROGRESS NOTES
Chief Complaint   Patient presents with    COPD     Pt. States Flare up    Cough     X 1 week    Headache         Arcelia Quintero is a 54 y.o. female who presents for COPD (Pt. States Flare up), Cough (X 1 week), and Headache    Patient was seen at at urgent treatment on 5/27 and 6/20 for COPD exacerbation.  She was treated both times with steroids and antibiotics.  She states that she felt better after completing these medications but her cough never fully resolved.  Reports her shortness of breath is baseline.  She still smokes daily.  Would be interested in quitting nicotine supplementation or replacement is not helpful.  She has not established with pulmonology.  She reports that she has the Breztri inhalers at home and is not using it regularly.  She does get good benefit with it.  She has no antibiotic allergies.  She reports thrush from her recent antibiotic use.    Past Medical History:   Diagnosis Date    Abnormal Pap smear of cervix     Allergic rhinitis     Asthma     Atypical chest pain     Brain tumor     Status post brain tumor resection in 1989.    Candidiasis of mouth     Cervical high risk HPV (human papillomavirus) test positive 09/01/2020    CKD (chronic kidney disease), stage III     Colon polyps 09/09/2020    sessile and tubular adenoma    Conversion disorder     COPD (chronic obstructive pulmonary disease)     COVID-19     Elevated liver enzymes     Former smoker 07/05/2023    H/O Helicobacter infection      Status post EGD 9/4/2012, pathology revealed H. pylori infection.    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     Neurology evaluation thought it was numbness from carpal tunnel and not Guillain-Valencia    Hyperlipidemia     Hypertension     Influenza     Internal hemorrhoids     Low grade squamous intraepith lesion on cytologic smear cervix (lgsil) 09/01/2020    Median neuropathy     Migraine     PTSD (post-traumatic stress disorder)      Seizures     2 seizures after surgery    Small fiber neuropathy     Stroke     Tinea corporis        Past Surgical History:   Procedure Laterality Date    BRAIN SURGERY      status post brain tumor resection    CARDIAC CATHETERIZATION N/A 2023    Procedure: Left Heart Cath;  Surgeon: Ryder Delarosa III, MD;  Location: [facility] CATH INVASIVE LOCATION;  Service: Cardiovascular;  Laterality: N/A;    COLONOSCOPY      polyp removal    LIVER BIOPSY      UPPER GASTROINTESTINAL ENDOSCOPY      Status post EGD 2012, pathology revealed H. pylori infection.       Family History   Adopted: Yes   Problem Relation Age of Onset    Lung disease Mother     Heart disease Mother     Stroke Mother     Throat cancer Mother     Uterine cancer Mother     Ovarian cancer Mother     Prostate cancer Father     No Known Problems Sister     No Known Problems Brother     Breast cancer Other     Diabetes Other     Colon cancer Other        Social History     Socioeconomic History    Marital status: Legally    Tobacco Use    Smoking status: Every Day     Current packs/day: 0.00     Average packs/day: 0.5 packs/day for 41.7 years (20.9 ttl pk-yrs)     Types: Cigarettes     Start date: 1981     Last attempt to quit: 2022     Years since quittin.8     Passive exposure: Current    Smokeless tobacco: Never    Tobacco comments:     quit with chantix in past   Vaping Use    Vaping status: Never Used   Substance and Sexual Activity    Alcohol use: No    Drug use: No    Sexual activity: Yes     Partners: Male     Birth control/protection: Post-menopausal       Allergies   Allergen Reactions    Codeine Hives    Influenza Vaccines Other (See Comments)     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 Negative for chills and fever.   Respiratory:  Positive for cough, shortness of breath and  "wheezing.    Cardiovascular:  Negative for chest pain, palpitations and leg swelling.   Neurological:  Positive for headache.       Vitals:    08/01/24 1035   BP: 164/100   Pulse: 84   SpO2: 91%   Weight: 66.1 kg (145 lb 12.8 oz)   Height: 170.2 cm (67.01\")   PainSc: 10-Worst pain ever   PainLoc: Head     Body mass index is 22.83 kg/m².    Current Outpatient Medications on File Prior to Visit   Medication Sig Dispense Refill    aspirin 81 MG EC tablet Take 1 tablet by mouth Daily.      atorvastatin (LIPITOR) 40 MG tablet Take 1 tablet by mouth Daily. 30 tablet 1    FLUoxetine (PROzac) 20 MG capsule Take 1 capsule by mouth Daily. 30 capsule 0    lactulose (CHRONULAC) 10 GM/15ML solution Take 45 mL by mouth 2 (Two) Times a Day.      levETIRAcetam (Keppra) 100 MG/ML solution Take 5 mL by mouth 2 (Two) Times a Day. 300 mL 0    linaclotide (Linzess) 145 MCG capsule capsule Take 1 capsule by mouth Every Morning Before Breakfast. 90 capsule 3    mirtazapine (REMERON) 7.5 MG tablet Take 2 tablets by mouth Every Night. 30 tablet 0    pantoprazole (PROTONIX) 40 MG EC tablet TAKE 1 TABLET BY MOUTH DAILY 90 tablet 1    rizatriptan (Maxalt) 10 MG tablet Take 1 tablet at onset of headache.  May repeat once in 2 hours.  Do not take more than 2 tabs a day or 8 tabs a month 8 tablet 3    topiramate (TOPAMAX) 25 MG tablet TAKE ONE TABLET BY MOUTH ONCE NIGHTLY FOR 7 DAYS THEN TAKE 1 TABLET BY MOUTH TWICE A DAY FOR 7 DAYS THEN TAKE ONE TABLET BY MOUTH EVERY MORNING AND 2 TABLETS AT NIGHT FOR 7 DAYS (Patient taking differently: Take 1 tablet by mouth 2 (Two) Times a Day. TAKE ONE TABLET BY MOUTH ONCE NIGHTLY FOR 7 DAYS THEN TAKE 1 TABLET BY MOUTH TWICE A DAY FOR 7 DAYS THEN TAKE ONE TABLET BY MOUTH EVERY MORNING AND 2 TABLETS AT NIGHT FOR 7 DAYS) 42 tablet 3    [DISCONTINUED] amLODIPine (NORVASC) 10 MG tablet Take 1 tablet by mouth Daily. 90 tablet 0    [DISCONTINUED] Budeson-Glycopyrrol-Formoterol (BREZTRI) 160-9-4.8 MCG/ACT aerosol " inhaler Inhale 2 puffs 2 (Two) Times a Day. 10.7 g 11    [DISCONTINUED] carvedilol (COREG) 12.5 MG tablet Take 1 tablet by mouth 2 (Two) Times a Day With Meals. 180 tablet 1    [DISCONTINUED] methylPREDNISolone (MEDROL) 4 MG dose pack Take as directed on package instructions. 21 tablet 0     No current facility-administered medications on file prior to visit.       Results for orders placed or performed during the hospital encounter of 05/27/24   POC Rapid Strep A    Specimen: Swab   Result Value Ref Range    Rapid Strep A Screen Negative     Internal Control Passed     Lot Number [id number]     Expiration Date 10-    Covid-19 + Flu A&B AG, Veritor    Specimen: Swab   Result Value Ref Range    SARS Antigen Not Detected Not Detected, Presumptive Negative    Influenza A Antigen OREN Not Detected Not Detected    Influenza B Antigen OREN Not Detected Not Detected    Internal Control Passed Passed    Lot Number [id number]     Expiration Date 2-5-2025        PE    Physical Exam  Vitals reviewed.   Constitutional:       General: She is not in acute distress.     Appearance: Normal appearance. She is well-developed and normal weight. She is not ill-appearing or diaphoretic.   HENT:      Head: Normocephalic and atraumatic.   Eyes:      Extraocular Movements: Extraocular movements intact.      Conjunctiva/sclera: Conjunctivae normal.   Cardiovascular:      Rate and Rhythm: Normal rate and regular rhythm.      Heart sounds: Normal heart sounds.   Pulmonary:      Effort: Pulmonary effort is normal. No respiratory distress.      Breath sounds: Wheezing and rales present.   Musculoskeletal:         General: Normal range of motion.      Cervical back: Normal range of motion.   Neurological:      General: No focal deficit present.      Mental Status: She is alert.   Psychiatric:         Attention and Perception: She is attentive.         Mood and Affect: Mood normal.         Speech: Speech normal.         Behavior: Behavior  normal. Behavior is cooperative.         Thought Content: Thought content normal.         Judgment: Judgment normal.          A/P    Diagnoses and all orders for this visit:    1. Cough, unspecified type (Primary)  -     POCT SARS-CoV-2 Antigen OREN + Flu  -     XR Chest PA & Lateral; Future    2. Wheezing  -     albuterol sulfate  (90 Base) MCG/ACT inhaler; Inhale 2 puffs Every 4 (Four) Hours As Needed for Wheezing.  Dispense: 18 g; Refill: 0    3. COPD with exacerbation  -     Ambulatory Referral to Pulmonology  -     Budeson-Glycopyrrol-Formoterol (BREZTRI) 160-9-4.8 MCG/ACT aerosol inhaler; Inhale 2 puffs 2 (Two) Times a Day.  Dispense: 10.7 g; Refill: 11  -     XR Chest PA & Lateral; Future  Chronic cough for the last several months.  She has been to the [facility] on 2 separate occasions and has received antibiotics and steroids.  She states these have helped but have never fully resolved her symptoms.  She denies worsening shortness of breath.  Will send in refills on inhalers.  Will obtain a chest x-ray.  Treatment pending chest x-ray results.  Continues to smoke daily.  Avoid prednisone at this time given elevated blood pressure.  Will start a referral to pulmonology.    4. Essential hypertension  -     carvedilol (COREG) 12.5 MG tablet; Take 1 tablet by mouth 2 (Two) Times a Day With Meals.  Dispense: 180 tablet; Refill: 0  -     amLODIPine (NORVASC) 10 MG tablet; Take 1 tablet by mouth Daily.  Dispense: 90 tablet; Refill: 0  -     losartan (Cozaar) 50 MG tablet; Take 1 tablet by mouth Daily.  Dispense: 90 tablet; Refill: 0  Elevated today.  Did not take carvedilol this AM.  Will also add Losartan.  Monitor at home and call if greater than 140/90.    5. Thrush  -     nystatin (MYCOSTATIN) 100,000 unit/mL suspension; Swish and swallow 5 mL 4 (Four) Times a Day.  Dispense: 473 mL; Refill: 0         Plan of care reviewed with patient at the conclusion of today's visit. Education was provided regarding  diagnosis, management and any prescribed or recommended OTC medications.  Patient verbalizes understanding of and agreement with management plan.    Dictated Utilizing Dragon Dictation     Please note that portions of this note were completed with a voice recognition program.     Part of this note may be an electronic transcription/translation of spoken language to printed text using the Dragon Dictation System.    Return in about 2 months (around 10/14/2024) for Medicare Wellness.     Coby Ely PA-C

## 2024-08-02 ENCOUNTER — HOSPITAL ENCOUNTER (INPATIENT)
Facility: HOSPITAL | Age: 54
LOS: 1 days | Discharge: HOME | End: 2024-08-03
Attending: EMERGENCY MEDICINE | Admitting: INTERNAL MEDICINE
Payer: MEDICARE

## 2024-08-02 ENCOUNTER — APPOINTMENT (OUTPATIENT)
Dept: GENERAL RADIOLOGY | Facility: HOSPITAL | Age: 54
End: 2024-08-02
Payer: MEDICARE

## 2024-08-02 DIAGNOSIS — J44.1 COPD EXACERBATION: Primary | ICD-10-CM

## 2024-08-02 DIAGNOSIS — J96.01 ACUTE RESPIRATORY FAILURE WITH HYPOXIA: ICD-10-CM

## 2024-08-02 PROBLEM — J96.21 ACUTE ON CHRONIC RESPIRATORY FAILURE WITH HYPOXIA: Status: ACTIVE | Noted: 2024-08-02

## 2024-08-02 LAB
ALBUMIN SERPL-MCNC: 4 G/DL (ref 3.5–5.2)
ALBUMIN/GLOB SERPL: 1.4 G/DL
ALP SERPL-CCNC: 133 U/L (ref 39–117)
ALT SERPL W P-5'-P-CCNC: 18 U/L (ref 1–33)
ANION GAP SERPL CALCULATED.3IONS-SCNC: 9 MMOL/L (ref 5–15)
AST SERPL-CCNC: 19 U/L (ref 1–32)
B PARAPERT DNA SPEC QL NAA+PROBE: NOT DETECTED
B PERT DNA SPEC QL NAA+PROBE: NOT DETECTED
BASOPHILS # BLD AUTO: 0.11 10*3/MM3 (ref 0–0.2)
BASOPHILS NFR BLD AUTO: 0.8 % (ref 0–1.5)
BILIRUB SERPL-MCNC: 0.3 MG/DL (ref 0–1.2)
BUN SERPL-MCNC: 7 MG/DL (ref 6–20)
BUN/CREAT SERPL: 6.5 (ref 7–25)
C PNEUM DNA NPH QL NAA+NON-PROBE: NOT DETECTED
CALCIUM SPEC-SCNC: 9.3 MG/DL (ref 8.6–10.5)
CHLORIDE SERPL-SCNC: 103 MMOL/L (ref 98–107)
CO2 SERPL-SCNC: 29 MMOL/L (ref 22–29)
CREAT SERPL-MCNC: 1.08 MG/DL (ref 0.57–1)
D DIMER PPP FEU-MCNC: 0.41 MCGFEU/ML (ref 0–0.53)
DEPRECATED RDW RBC AUTO: 43.9 FL (ref 37–54)
EGFRCR SERPLBLD CKD-EPI 2021: 61.5 ML/MIN/1.73
EOSINOPHIL # BLD AUTO: 0.32 10*3/MM3 (ref 0–0.4)
EOSINOPHIL NFR BLD AUTO: 2.2 % (ref 0.3–6.2)
ERYTHROCYTE [DISTWIDTH] IN BLOOD BY AUTOMATED COUNT: 13.2 % (ref 12.3–15.4)
FLUAV RNA RESP QL NAA+PROBE: NOT DETECTED
FLUAV SUBTYP SPEC NAA+PROBE: NOT DETECTED
FLUBV RNA ISLT QL NAA+PROBE: NOT DETECTED
FLUBV RNA RESP QL NAA+PROBE: NOT DETECTED
GLOBULIN UR ELPH-MCNC: 2.8 GM/DL
GLUCOSE SERPL-MCNC: 144 MG/DL (ref 65–99)
HADV DNA SPEC NAA+PROBE: NOT DETECTED
HCOV 229E RNA SPEC QL NAA+PROBE: NOT DETECTED
HCOV HKU1 RNA SPEC QL NAA+PROBE: NOT DETECTED
HCOV NL63 RNA SPEC QL NAA+PROBE: NOT DETECTED
HCOV OC43 RNA SPEC QL NAA+PROBE: NOT DETECTED
HCT VFR BLD AUTO: 45.6 % (ref 34–46.6)
HGB BLD-MCNC: 15.1 G/DL (ref 12–15.9)
HMPV RNA NPH QL NAA+NON-PROBE: NOT DETECTED
HOLD SPECIMEN: NORMAL
HPIV1 RNA ISLT QL NAA+PROBE: NOT DETECTED
HPIV2 RNA SPEC QL NAA+PROBE: NOT DETECTED
HPIV3 RNA NPH QL NAA+PROBE: NOT DETECTED
HPIV4 P GENE NPH QL NAA+PROBE: NOT DETECTED
IMM GRANULOCYTES # BLD AUTO: 0.05 10*3/MM3 (ref 0–0.05)
IMM GRANULOCYTES NFR BLD AUTO: 0.3 % (ref 0–0.5)
LIPASE SERPL-CCNC: 25 U/L (ref 13–60)
LYMPHOCYTES # BLD AUTO: 3.99 10*3/MM3 (ref 0.7–3.1)
LYMPHOCYTES NFR BLD AUTO: 27.7 % (ref 19.6–45.3)
M PNEUMO IGG SER IA-ACNC: NOT DETECTED
MCH RBC QN AUTO: 30 PG (ref 26.6–33)
MCHC RBC AUTO-ENTMCNC: 33.1 G/DL (ref 31.5–35.7)
MCV RBC AUTO: 90.7 FL (ref 79–97)
MONOCYTES # BLD AUTO: 0.82 10*3/MM3 (ref 0.1–0.9)
MONOCYTES NFR BLD AUTO: 5.7 % (ref 5–12)
NEUTROPHILS NFR BLD AUTO: 63.3 % (ref 42.7–76)
NEUTROPHILS NFR BLD AUTO: 9.13 10*3/MM3 (ref 1.7–7)
NRBC BLD AUTO-RTO: 0 /100 WBC (ref 0–0.2)
NT-PROBNP SERPL-MCNC: 65 PG/ML (ref 0–900)
PLATELET # BLD AUTO: 213 10*3/MM3 (ref 140–450)
PMV BLD AUTO: 11.7 FL (ref 6–12)
POTASSIUM SERPL-SCNC: 4.2 MMOL/L (ref 3.5–5.2)
PROCALCITONIN SERPL-MCNC: 0.02 NG/ML (ref 0–0.25)
PROT SERPL-MCNC: 6.8 G/DL (ref 6–8.5)
RBC # BLD AUTO: 5.03 10*6/MM3 (ref 3.77–5.28)
RHINOVIRUS RNA SPEC NAA+PROBE: NOT DETECTED
RSV RNA NPH QL NAA+NON-PROBE: NOT DETECTED
SARS-COV-2 RNA NPH QL NAA+NON-PROBE: NOT DETECTED
SARS-COV-2 RNA RESP QL NAA+PROBE: NOT DETECTED
SODIUM SERPL-SCNC: 141 MMOL/L (ref 136–145)
TROPONIN T SERPL HS-MCNC: 7 NG/L
WBC NRBC COR # BLD AUTO: 14.42 10*3/MM3 (ref 3.4–10.8)
WHOLE BLOOD HOLD COAG: NORMAL
WHOLE BLOOD HOLD SPECIMEN: NORMAL

## 2024-08-02 PROCEDURE — 87636 SARSCOV2 & INF A&B AMP PRB: CPT | Performed by: EMERGENCY MEDICINE

## 2024-08-02 PROCEDURE — 80053 COMPREHEN METABOLIC PANEL: CPT | Performed by: EMERGENCY MEDICINE

## 2024-08-02 PROCEDURE — 94799 UNLISTED PULMONARY SVC/PX: CPT

## 2024-08-02 PROCEDURE — 84484 ASSAY OF TROPONIN QUANT: CPT | Performed by: EMERGENCY MEDICINE

## 2024-08-02 PROCEDURE — 0202U NFCT DS 22 TRGT SARS-COV-2: CPT | Performed by: INTERNAL MEDICINE

## 2024-08-02 PROCEDURE — 71045 X-RAY EXAM CHEST 1 VIEW: CPT

## 2024-08-02 PROCEDURE — 99223 1ST HOSP IP/OBS HIGH 75: CPT | Performed by: INTERNAL MEDICINE

## 2024-08-02 PROCEDURE — 83690 ASSAY OF LIPASE: CPT | Performed by: EMERGENCY MEDICINE

## 2024-08-02 PROCEDURE — 85379 FIBRIN DEGRADATION QUANT: CPT | Performed by: INTERNAL MEDICINE

## 2024-08-02 PROCEDURE — 94664 DEMO&/EVAL PT USE INHALER: CPT

## 2024-08-02 PROCEDURE — 94640 AIRWAY INHALATION TREATMENT: CPT

## 2024-08-02 PROCEDURE — 84145 PROCALCITONIN (PCT): CPT | Performed by: INTERNAL MEDICINE

## 2024-08-02 PROCEDURE — 85025 COMPLETE CBC W/AUTO DIFF WBC: CPT | Performed by: EMERGENCY MEDICINE

## 2024-08-02 PROCEDURE — 25010000002 CEFTRIAXONE PER 250 MG: Performed by: INTERNAL MEDICINE

## 2024-08-02 PROCEDURE — 83880 ASSAY OF NATRIURETIC PEPTIDE: CPT | Performed by: EMERGENCY MEDICINE

## 2024-08-02 PROCEDURE — 25010000002 ENOXAPARIN PER 10 MG: Performed by: INTERNAL MEDICINE

## 2024-08-02 PROCEDURE — 94761 N-INVAS EAR/PLS OXIMETRY MLT: CPT

## 2024-08-02 PROCEDURE — 99285 EMERGENCY DEPT VISIT HI MDM: CPT

## 2024-08-02 PROCEDURE — 25010000002 METHYLPREDNISOLONE PER 125 MG: Performed by: EMERGENCY MEDICINE

## 2024-08-02 PROCEDURE — 25010000002 METHYLPREDNISOLONE PER 40 MG: Performed by: INTERNAL MEDICINE

## 2024-08-02 RX ORDER — METHYLPREDNISOLONE SODIUM SUCCINATE 125 MG/2ML
125 INJECTION, POWDER, LYOPHILIZED, FOR SOLUTION INTRAMUSCULAR; INTRAVENOUS ONCE
Status: COMPLETED | OUTPATIENT
Start: 2024-08-02 | End: 2024-08-02

## 2024-08-02 RX ORDER — ENOXAPARIN SODIUM 100 MG/ML
40 INJECTION SUBCUTANEOUS DAILY
Status: DISCONTINUED | OUTPATIENT
Start: 2024-08-02 | End: 2024-08-03 | Stop reason: HOSPADM

## 2024-08-02 RX ORDER — GUAIFENESIN 600 MG/1
1200 TABLET, EXTENDED RELEASE ORAL EVERY 12 HOURS SCHEDULED
Status: DISCONTINUED | OUTPATIENT
Start: 2024-08-02 | End: 2024-08-03 | Stop reason: HOSPADM

## 2024-08-02 RX ORDER — IPRATROPIUM BROMIDE AND ALBUTEROL SULFATE 2.5; .5 MG/3ML; MG/3ML
3 SOLUTION RESPIRATORY (INHALATION)
Status: DISCONTINUED | OUTPATIENT
Start: 2024-08-02 | End: 2024-08-03 | Stop reason: HOSPADM

## 2024-08-02 RX ORDER — SODIUM CHLORIDE 0.9 % (FLUSH) 0.9 %
10 SYRINGE (ML) INJECTION AS NEEDED
Status: DISCONTINUED | OUTPATIENT
Start: 2024-08-02 | End: 2024-08-03 | Stop reason: HOSPADM

## 2024-08-02 RX ORDER — FLUOXETINE HYDROCHLORIDE 20 MG/1
20 CAPSULE ORAL DAILY
Status: DISCONTINUED | OUTPATIENT
Start: 2024-08-02 | End: 2024-08-03 | Stop reason: HOSPADM

## 2024-08-02 RX ORDER — AMLODIPINE BESYLATE 10 MG/1
10 TABLET ORAL DAILY
Status: DISCONTINUED | OUTPATIENT
Start: 2024-08-02 | End: 2024-08-03 | Stop reason: HOSPADM

## 2024-08-02 RX ORDER — BISACODYL 5 MG/1
5 TABLET, DELAYED RELEASE ORAL DAILY PRN
Status: DISCONTINUED | OUTPATIENT
Start: 2024-08-02 | End: 2024-08-03 | Stop reason: HOSPADM

## 2024-08-02 RX ORDER — METHYLPREDNISOLONE SODIUM SUCCINATE 40 MG/ML
40 INJECTION, POWDER, LYOPHILIZED, FOR SOLUTION INTRAMUSCULAR; INTRAVENOUS EVERY 8 HOURS
Status: DISCONTINUED | OUTPATIENT
Start: 2024-08-02 | End: 2024-08-03 | Stop reason: HOSPADM

## 2024-08-02 RX ORDER — SODIUM CHLORIDE 9 MG/ML
40 INJECTION, SOLUTION INTRAVENOUS AS NEEDED
Status: DISCONTINUED | OUTPATIENT
Start: 2024-08-02 | End: 2024-08-03 | Stop reason: HOSPADM

## 2024-08-02 RX ORDER — GUAIFENESIN/DEXTROMETHORPHAN 100-10MG/5
10 SYRUP ORAL EVERY 6 HOURS PRN
Status: DISCONTINUED | OUTPATIENT
Start: 2024-08-02 | End: 2024-08-03 | Stop reason: HOSPADM

## 2024-08-02 RX ORDER — MIRTAZAPINE 15 MG/1
15 TABLET, FILM COATED ORAL NIGHTLY
Status: DISCONTINUED | OUTPATIENT
Start: 2024-08-02 | End: 2024-08-03 | Stop reason: HOSPADM

## 2024-08-02 RX ORDER — ATORVASTATIN CALCIUM 40 MG/1
40 TABLET, FILM COATED ORAL DAILY
Status: DISCONTINUED | OUTPATIENT
Start: 2024-08-02 | End: 2024-08-03 | Stop reason: HOSPADM

## 2024-08-02 RX ORDER — ONDANSETRON 4 MG/1
4 TABLET, ORALLY DISINTEGRATING ORAL EVERY 6 HOURS PRN
Status: DISCONTINUED | OUTPATIENT
Start: 2024-08-02 | End: 2024-08-03 | Stop reason: HOSPADM

## 2024-08-02 RX ORDER — PANTOPRAZOLE SODIUM 40 MG/1
40 TABLET, DELAYED RELEASE ORAL DAILY
Status: DISCONTINUED | OUTPATIENT
Start: 2024-08-02 | End: 2024-08-03 | Stop reason: HOSPADM

## 2024-08-02 RX ORDER — ARFORMOTEROL TARTRATE 15 UG/2ML
15 SOLUTION RESPIRATORY (INHALATION)
Status: DISCONTINUED | OUTPATIENT
Start: 2024-08-02 | End: 2024-08-03 | Stop reason: HOSPADM

## 2024-08-02 RX ORDER — ACETAMINOPHEN 160 MG/5ML
650 SOLUTION ORAL EVERY 4 HOURS PRN
Status: DISCONTINUED | OUTPATIENT
Start: 2024-08-02 | End: 2024-08-03 | Stop reason: HOSPADM

## 2024-08-02 RX ORDER — AMOXICILLIN 250 MG
2 CAPSULE ORAL 2 TIMES DAILY PRN
Status: DISCONTINUED | OUTPATIENT
Start: 2024-08-02 | End: 2024-08-03 | Stop reason: HOSPADM

## 2024-08-02 RX ORDER — SODIUM CHLORIDE 0.9 % (FLUSH) 0.9 %
10 SYRINGE (ML) INJECTION EVERY 12 HOURS SCHEDULED
Status: DISCONTINUED | OUTPATIENT
Start: 2024-08-02 | End: 2024-08-03 | Stop reason: HOSPADM

## 2024-08-02 RX ORDER — SUMATRIPTAN 50 MG/1
50 TABLET, FILM COATED ORAL
Status: DISCONTINUED | OUTPATIENT
Start: 2024-08-02 | End: 2024-08-03 | Stop reason: HOSPADM

## 2024-08-02 RX ORDER — ASPIRIN 81 MG/1
81 TABLET ORAL DAILY
Status: DISCONTINUED | OUTPATIENT
Start: 2024-08-02 | End: 2024-08-03 | Stop reason: HOSPADM

## 2024-08-02 RX ORDER — BISACODYL 10 MG
10 SUPPOSITORY, RECTAL RECTAL DAILY PRN
Status: DISCONTINUED | OUTPATIENT
Start: 2024-08-02 | End: 2024-08-03 | Stop reason: HOSPADM

## 2024-08-02 RX ORDER — ACETAMINOPHEN 325 MG/1
650 TABLET ORAL EVERY 4 HOURS PRN
Status: DISCONTINUED | OUTPATIENT
Start: 2024-08-02 | End: 2024-08-03 | Stop reason: HOSPADM

## 2024-08-02 RX ORDER — LOSARTAN POTASSIUM 50 MG/1
50 TABLET ORAL DAILY
Status: DISCONTINUED | OUTPATIENT
Start: 2024-08-02 | End: 2024-08-03 | Stop reason: HOSPADM

## 2024-08-02 RX ORDER — NICOTINE 21 MG/24HR
1 PATCH, TRANSDERMAL 24 HOURS TRANSDERMAL
Status: DISCONTINUED | OUTPATIENT
Start: 2024-08-02 | End: 2024-08-03 | Stop reason: HOSPADM

## 2024-08-02 RX ORDER — TOPIRAMATE 25 MG/1
25 TABLET ORAL 2 TIMES DAILY
Status: DISCONTINUED | OUTPATIENT
Start: 2024-08-02 | End: 2024-08-03 | Stop reason: HOSPADM

## 2024-08-02 RX ORDER — POLYETHYLENE GLYCOL 3350 17 G/17G
17 POWDER, FOR SOLUTION ORAL DAILY PRN
Status: DISCONTINUED | OUTPATIENT
Start: 2024-08-02 | End: 2024-08-03 | Stop reason: HOSPADM

## 2024-08-02 RX ORDER — ACETAMINOPHEN 650 MG/1
650 SUPPOSITORY RECTAL EVERY 4 HOURS PRN
Status: DISCONTINUED | OUTPATIENT
Start: 2024-08-02 | End: 2024-08-03 | Stop reason: HOSPADM

## 2024-08-02 RX ORDER — IPRATROPIUM BROMIDE AND ALBUTEROL SULFATE 2.5; .5 MG/3ML; MG/3ML
3 SOLUTION RESPIRATORY (INHALATION) ONCE
Status: COMPLETED | OUTPATIENT
Start: 2024-08-02 | End: 2024-08-02

## 2024-08-02 RX ORDER — ONDANSETRON 2 MG/ML
4 INJECTION INTRAMUSCULAR; INTRAVENOUS EVERY 6 HOURS PRN
Status: DISCONTINUED | OUTPATIENT
Start: 2024-08-02 | End: 2024-08-03 | Stop reason: HOSPADM

## 2024-08-02 RX ADMIN — TOPIRAMATE 25 MG: 25 TABLET, FILM COATED ORAL at 21:16

## 2024-08-02 RX ADMIN — NICOTINE 1 PATCH: 21 PATCH, EXTENDED RELEASE TRANSDERMAL at 08:01

## 2024-08-02 RX ADMIN — FLUOXETINE HYDROCHLORIDE 20 MG: 20 CAPSULE ORAL at 11:26

## 2024-08-02 RX ADMIN — Medication 10 ML: at 11:27

## 2024-08-02 RX ADMIN — ENOXAPARIN SODIUM 40 MG: 100 INJECTION SUBCUTANEOUS at 11:26

## 2024-08-02 RX ADMIN — MIRTAZAPINE 15 MG: 15 TABLET, FILM COATED ORAL at 21:16

## 2024-08-02 RX ADMIN — ATORVASTATIN CALCIUM 40 MG: 40 TABLET, FILM COATED ORAL at 11:26

## 2024-08-02 RX ADMIN — LOSARTAN POTASSIUM 50 MG: 50 TABLET, FILM COATED ORAL at 11:26

## 2024-08-02 RX ADMIN — SODIUM CHLORIDE 2000 MG: 900 INJECTION INTRAVENOUS at 11:25

## 2024-08-02 RX ADMIN — TOPIRAMATE 25 MG: 25 TABLET, FILM COATED ORAL at 11:26

## 2024-08-02 RX ADMIN — METHYLPREDNISOLONE SODIUM SUCCINATE 40 MG: 40 INJECTION, POWDER, FOR SOLUTION INTRAMUSCULAR; INTRAVENOUS at 15:06

## 2024-08-02 RX ADMIN — ARFORMOTEROL TARTRATE 15 MCG: 15 SOLUTION RESPIRATORY (INHALATION) at 19:16

## 2024-08-02 RX ADMIN — METHYLPREDNISOLONE SODIUM SUCCINATE 40 MG: 40 INJECTION, POWDER, FOR SOLUTION INTRAMUSCULAR; INTRAVENOUS at 21:23

## 2024-08-02 RX ADMIN — AMLODIPINE BESYLATE 10 MG: 10 TABLET ORAL at 11:26

## 2024-08-02 RX ADMIN — DOXYCYCLINE 100 MG: 100 INJECTION, POWDER, LYOPHILIZED, FOR SOLUTION INTRAVENOUS at 21:16

## 2024-08-02 RX ADMIN — ASPIRIN 81 MG: 81 TABLET, COATED ORAL at 11:27

## 2024-08-02 RX ADMIN — PANTOPRAZOLE SODIUM 40 MG: 40 TABLET, DELAYED RELEASE ORAL at 11:26

## 2024-08-02 RX ADMIN — IPRATROPIUM BROMIDE AND ALBUTEROL SULFATE 3 ML: 2.5; .5 SOLUTION RESPIRATORY (INHALATION) at 19:15

## 2024-08-02 RX ADMIN — IPRATROPIUM BROMIDE AND ALBUTEROL SULFATE 3 ML: 2.5; .5 SOLUTION RESPIRATORY (INHALATION) at 07:40

## 2024-08-02 RX ADMIN — METHYLPREDNISOLONE SODIUM SUCCINATE 125 MG: 125 INJECTION, POWDER, FOR SOLUTION INTRAMUSCULAR; INTRAVENOUS at 06:20

## 2024-08-02 RX ADMIN — GUAIFENESIN 1200 MG: 600 TABLET, EXTENDED RELEASE ORAL at 11:26

## 2024-08-02 RX ADMIN — DOXYCYCLINE 100 MG: 100 INJECTION, POWDER, LYOPHILIZED, FOR SOLUTION INTRAVENOUS at 11:25

## 2024-08-02 RX ADMIN — Medication 10 MG: at 21:15

## 2024-08-02 RX ADMIN — GUAIFENESIN 1200 MG: 600 TABLET, EXTENDED RELEASE ORAL at 21:15

## 2024-08-02 NOTE — ED NOTES
Arcelia Quintero    Nursing Report ED to Floor:  Mental status: axo x4  Ambulatory status: up at [facility]  Oxygen Therapy:  5 l  Cardiac Rhythm: nsr  Admitted from: home/ ed   Safety Concerns:  none  Social Issues: none  ED Room #:  17    ED Nurse Phone Extension - [phone] or may call [phone].      HPI:   Chief Complaint   Patient presents with    Shortness of Breath       Past Medical History:  Past Medical History:   Diagnosis Date    Abnormal Pap smear of cervix     Allergic rhinitis     Asthma     Atypical chest pain     Brain tumor     Status post brain tumor resection in 1989.    Candidiasis of mouth     Cervical high risk HPV (human papillomavirus) test positive 09/01/2020    CKD (chronic kidney disease), stage III     Colon polyps 09/09/2020    sessile and tubular adenoma    Conversion disorder     COPD (chronic obstructive pulmonary disease)     COVID-19     Elevated liver enzymes     Former smoker 07/05/2023    H/O Helicobacter infection      Status post EGD 9/4/2012, pathology revealed H. pylori infection.    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     Neurology evaluation thought it was numbness from carpal tunnel and not Guillain-Waltham    Hyperlipidemia     Hypertension     Influenza     Internal hemorrhoids     Low grade squamous intraepith lesion on cytologic smear cervix (lgsil) 09/01/2020    Median neuropathy     Migraine     PTSD (post-traumatic stress disorder)     Seizures     2 seizures after surgery    Small fiber neuropathy     Stroke     Tinea corporis         Past Surgical History:  Past Surgical History:   Procedure Laterality Date    BRAIN SURGERY      status post brain tumor resection    CARDIAC CATHETERIZATION N/A 12/27/2023    Procedure: Left Heart Cath;  Surgeon: Ryder Delarosa III, MD;  Location: [facility] CATH INVASIVE LOCATION;  Service: Cardiovascular;  Laterality: N/A;    COLONOSCOPY      polyp removal    LIVER BIOPSY      UPPER GASTROINTESTINAL  ENDOSCOPY      Status post EGD 9/4/2012, pathology revealed H. pylori infection.        Admitting Doctor:   Ruth Rahman MD    Consulting Provider(s):  Consults       No orders found from 7/4/2024 to 8/3/2024.             Admitting Diagnosis:   The primary encounter diagnosis was COPD exacerbation. A diagnosis of Acute respiratory failure with hypoxia was also pertinent to this visit.    Most Recent Vitals:   Vitals:    08/02/24 0616 08/02/24 0724 08/02/24 0728 08/02/24 0740   BP:       BP Location:       Patient Position:       Pulse: 65 66 65 60   Resp:    22   Temp:       TempSrc:       SpO2: 94% (!) 89% (!) 88% 90%   Weight:       Height:           Active LDAs/IV Access:   Lines, Drains & Airways       Active LDAs       Name Placement date Placement time Site Days    Peripheral IV 08/02/24 0559 Anterior;Right;Upper Arm 08/02/24  0559  Arm  less than 1    Peripheral IV 08/02/24 0631 Left Antecubital 08/02/24  0631  Antecubital  less than 1                    Labs (abnormal labs have a star):   Labs Reviewed   COMPREHENSIVE METABOLIC PANEL - Abnormal; Notable for the following components:       Result Value    Glucose 144 (*)     Creatinine 1.08 (*)     Alkaline Phosphatase 133 (*)     BUN/Creatinine Ratio 6.5 (*)     All other components within normal limits    Narrative:     GFR Normal >60  Chronic Kidney Disease <60  Kidney Failure <15     CBC WITH AUTO DIFFERENTIAL - Abnormal; Notable for the following components:    WBC 14.42 (*)     Neutrophils, Absolute 9.13 (*)     Lymphocytes, Absolute 3.99 (*)     All other components within normal limits   COVID-19 AND FLU A/B, NP SWAB IN TRANSPORT MEDIA 1 HR TAT - Normal    Narrative:     Fact sheet for providers: https://www.fda.gov/media/829695/download    Fact sheet for patients: https://www.fda.gov/media/849613/download    Test performed by PCR.   BNP (IN-HOUSE) - Normal    Narrative:     This assay is used as an aid in the diagnosis of individuals  "suspected of having heart failure. It can be used as an aid in the diagnosis of acute decompensated heart failure (ADHF) in patients presenting with signs and symptoms of ADHF to the emergency department (ED). In addition, NT-proBNP of <300 pg/mL indicates ADHF is not likely.    Age Range Result Interpretation  NT-proBNP Concentration (pg/mL:      <50             Positive            >450                   Gray                 300-450                    Negative             <300    50-75           Positive            >900                  Gray                300-900                  Negative            <300      >75             Positive            >1800                  Gray                300-1800                  Negative            <300   SINGLE HS TROPONIN T - Normal    Narrative:     High Sensitive Troponin T Reference Range:  <14.0 ng/L- Negative Female for AMI  <22.0 ng/L- Negative Male for AMI  >=14 - Abnormal Female indicating possible myocardial injury.  >=22 - Abnormal Male indicating possible myocardial injury.   Clinicians would have to utilize clinical acumen, EKG, Troponin, and serial changes to determine if it is an Acute Myocardial Infarction or myocardial injury due to an underlying chronic condition.        LIPASE - Normal   PROCALCITONIN - Normal    Narrative:     As a Marker for Sepsis (Non-Neonates):    1. <0.5 ng/mL represents a low risk of severe sepsis and/or septic shock.  2. >2 ng/mL represents a high risk of severe sepsis and/or septic shock.    As a Marker for Lower Respiratory Tract Infections that require antibiotic therapy:    PCT on Admission    Antibiotic Therapy       6-12 Hrs later    >0.5                Strongly Recommended  >0.25 - <0.5        Recommended   0.1 - 0.25          Discouraged              Remeasure/reassess PCT  <0.1                Strongly Discouraged     Remeasure/reassess PCT    As 28 day mortality risk marker: \"Change in Procalcitonin Result\" (>80% or <=80%) if " "Day 0 (or Day 1) and Day 4 values are available. Refer to http://www.[facility]-pct-calculator.com    Change in PCT <=80%  A decrease of PCT levels below or equal to 80% defines a positive change in PCT test result representing a higher risk for 28-day all-cause mortality of patients diagnosed with severe sepsis for septic shock.    Change in PCT >80%  A decrease of PCT levels of more than 80% defines a negative change in PCT result representing a lower risk for 28-day all-cause mortality of patients diagnosed with severe sepsis or septic shock.      D-DIMER, QUANTITATIVE - Normal    Narrative:     According to the assay 's published package insert, a normal (<0.50 MCGFEU/mL) D-dimer result in conjunction with a non-high clinical probability assessment, excludes deep vein thrombosis (DVT) and pulmonary embolism (PE) with high sensitivity.    D-dimer values increase with age and this can make VTE exclusion of an older population difficult. To address this, the [employer], based on best available evidence and recent guidelines, recommends that clinicians use age-adjusted D-dimer thresholds in patients greater than 50 years of age with: a) a low probability of PE who do not meet all Pulmonary Embolism Rule Out Criteria, or b) in those with intermediate probability of PE.   The formula for an age-adjusted D-dimer cut-off is \"age/100\".  For example, a 60 year old patient would have an age-adjusted cut-off of 0.60 MCGFEU/mL and an 80 year old 0.80 MCGFEU/mL.   COVID PRE-OP / PRE-PROCEDURE SCREENING ORDER (NO ISOLATION)    Narrative:     The following orders were created for panel order COVID PRE-OP / PRE-PROCEDURE SCREENING ORDER (NO ISOLATION) - Swab, Nasopharynx.  Procedure                               Abnormality         Status                     ---------                               -----------         ------                     COVID-19 and FLU A/B PCR...[[id number]]  Normal             "  Final result                 Please view results for these tests on the individual orders.   RESPIRATORY PANEL PCR W/ COVID-19 (SARS-COV-2), NP SWAB IN UTM/VTP, 2 HR TAT   RAINBOW DRAW    Narrative:     The following orders were created for panel order [place] Draw.  Procedure                               Abnormality         Status                     ---------                               -----------         ------                     Green Top (Gel)[[id number]]                                  Final result               Lavender Top[[id number]]                                     Final result               Gold Top - SST[[id number]]                                   Final result               Person Top[[id number]]                                         Final result               Light Blue Top[[id number]]                                   Final result                 Please view results for these tests on the individual orders.   CBC AND DIFFERENTIAL    Narrative:     The following orders were created for panel order CBC & Differential.  Procedure                               Abnormality         Status                     ---------                               -----------         ------                     CBC Auto Differential[[id number]]        Abnormal            Final result                 Please view results for these tests on the individual orders.   GREEN TOP   LAVENDER TOP   GOLD TOP - SST   GRAY TOP   LIGHT BLUE TOP       Meds Given in ED:   Medications   sodium chloride 0.9 % flush 10 mL (has no administration in time range)   nicotine (NICODERM CQ) 21 MG/24HR patch 1 patch (1 patch Transdermal Medication Applied 8/2/24 0801)   sodium chloride 0.9 % flush 10 mL (has no administration in time range)   sodium chloride 0.9 % flush 10 mL (has no administration in time range)   sodium chloride 0.9 % infusion 40 mL (has no administration in time range)   Enoxaparin Sodium (LOVENOX) syringe 40 mg (has no  administration in time range)   Potassium Replacement - Follow Nurse / BPA Driven Protocol (has no administration in time range)   Magnesium Standard Dose Replacement - Follow Nurse / BPA Driven Protocol (has no administration in time range)   Phosphorus Replacement - Follow Nurse / BPA Driven Protocol (has no administration in time range)   Calcium Replacement - Follow Nurse / BPA Driven Protocol (has no administration in time range)   acetaminophen (TYLENOL) tablet 650 mg (has no administration in time range)     Or   acetaminophen (TYLENOL) 160 MG/5ML oral solution 650 mg (has no administration in time range)     Or   acetaminophen (TYLENOL) suppository 650 mg (has no administration in time range)   melatonin tablet 10 mg (has no administration in time range)   sennosides-docusate (PERICOLACE) 8.6-50 MG per tablet 2 tablet (has no administration in time range)     And   polyethylene glycol (MIRALAX) packet 17 g (has no administration in time range)     And   bisacodyl (DULCOLAX) EC tablet 5 mg (has no administration in time range)     And   bisacodyl (DULCOLAX) suppository 10 mg (has no administration in time range)   ondansetron ODT (ZOFRAN-ODT) disintegrating tablet 4 mg (has no administration in time range)     Or   ondansetron (ZOFRAN) injection 4 mg (has no administration in time range)   methylPREDNISolone sodium succinate (SOLU-Medrol) injection 40 mg (has no administration in time range)   doxycycline (VIBRAMYCIN) 100 mg in sodium chloride 0.9 % 100 mL MBP (has no administration in time range)   cefTRIAXone (ROCEPHIN) 2,000 mg in sodium chloride 0.9 % 100 mL MBP (has no administration in time range)   nicotine polacrilex (NICORETTE) gum 4 mg (has no administration in time range)   guaiFENesin-dextromethorphan (ROBITUSSIN DM) 100-10 MG/5ML syrup 10 mL (has no administration in time range)   guaiFENesin (MUCINEX) 12 hr tablet 1,200 mg (has no administration in time range)   ipratropium-albuterol (DUO-NEB)  nebulizer solution 3 mL (has no administration in time range)   aspirin EC tablet 81 mg (has no administration in time range)   amLODIPine (NORVASC) tablet 10 mg (has no administration in time range)   atorvastatin (LIPITOR) tablet 40 mg (has no administration in time range)   FLUoxetine (PROzac) capsule 20 mg (has no administration in time range)   losartan (COZAAR) tablet 50 mg (has no administration in time range)   pantoprazole (PROTONIX) EC tablet 40 mg (has no administration in time range)   mirtazapine (REMERON) tablet 15 mg (has no administration in time range)   SUMAtriptan (IMITREX) tablet 50 mg (has no administration in time range)   topiramate (TOPAMAX) tablet 25 mg (has no administration in time range)   tiotropium (SPIRIVA RESPIMAT) 2.5 mcg/act aerosol solution inhaler (has no administration in time range)   arformoterol (BROVANA) nebulizer solution 15 mcg (has no administration in time range)   ipratropium-albuterol (DUO-NEB) nebulizer solution 3 mL (has no administration in time range)   methylPREDNISolone sodium succinate (SOLU-Medrol) injection 125 mg (125 mg Intravenous Given 8/2/24 0620)   ipratropium-albuterol (DUO-NEB) nebulizer solution 3 mL (3 mL Nebulization Given 8/2/24 0740)           Last NIH score:                                                          Dysphagia screening results:        [place] Coma Scale:  No data recorded     CIWA:        Restraint Type:            Isolation Status:  No active isolations

## 2024-08-02 NOTE — PLAN OF CARE
Problem: Adult Inpatient Plan of Care  Goal: Plan of Care Review  Outcome: Ongoing, Progressing  Flowsheets (Taken 8/2/2024 1644)  Progress: no change  Plan of Care Reviewed With: patient  Goal: Patient-Specific Goal (Individualized)  Outcome: Ongoing, Progressing  Goal: Absence of Hospital-Acquired Illness or Injury  Outcome: Ongoing, Progressing  Intervention: Identify and Manage Fall Risk  Recent Flowsheet Documentation  Taken 8/2/2024 1200 by Ned Lovell RN  Safety Promotion/Fall Prevention:   activity supervised   nonskid shoes/slippers when out of bed   room organization consistent   safety round/check completed  Intervention: Prevent Skin Injury  Recent Flowsheet Documentation  Taken 8/2/2024 1200 by Ned Lovell RN  Body Position:   position changed independently   weight shifting  Skin Protection:   adhesive use limited   tubing/devices free from skin contact  Intervention: Prevent and Manage VTE (Venous Thromboembolism) Risk  Recent Flowsheet Documentation  Taken 8/2/2024 1200 by Ned Lovell RN  Activity Management:   activity encouraged   ambulated in room  VTE Prevention/Management: (see EMAR) other (see comments)  Range of Motion:   active ROM (range of motion) encouraged   ROM (range of motion) performed  Intervention: Prevent Infection  Recent Flowsheet Documentation  Taken 8/2/2024 1200 by Ned Lovell RN  Infection Prevention:   environmental surveillance performed   equipment surfaces disinfected   hand hygiene promoted   personal protective equipment utilized   rest/sleep promoted   single patient room provided  Goal: Optimal Comfort and Wellbeing  Outcome: Ongoing, Progressing  Intervention: Provide Person-Centered Care  Recent Flowsheet Documentation  Taken 8/2/2024 1200 by Ned Lovell RN  Trust Relationship/Rapport:   care explained   choices provided   emotional support provided   empathic listening provided   questions answered   questions encouraged   reassurance  provided   thoughts/feelings acknowledged  Goal: Readiness for Transition of Care  Outcome: Ongoing, Progressing  Intervention: Mutually Develop Transition Plan  Recent Flowsheet Documentation  Taken 8/2/2024 0953 by Ned Lovell RN  Equipment Currently Used at Home:   shower chair   pulse ox   oxygen     Problem: Asthma Comorbidity  Goal: Maintenance of Asthma Control  Outcome: Ongoing, Progressing  Intervention: Maintain Asthma Symptom Control  Recent Flowsheet Documentation  Taken 8/2/2024 1200 by Ned Lovell RN  Medication Review/Management: medications reviewed     Problem: Behavioral Health Comorbidity  Goal: Maintenance of Behavioral Health Symptom Control  Outcome: Ongoing, Progressing  Intervention: Maintain Behavioral Health Symptom Control  Recent Flowsheet Documentation  Taken 8/2/2024 1200 by Ned Lovell RN  Medication Review/Management: medications reviewed     Problem: COPD (Chronic Obstructive Pulmonary Disease) Comorbidity  Goal: Maintenance of COPD Symptom Control  Outcome: Ongoing, Progressing  Intervention: Maintain COPD-Symptom Control  Recent Flowsheet Documentation  Taken 8/2/2024 1200 by Ned Lovell RN  Supportive Measures:   active listening utilized   decision-making supported   positive reinforcement provided   relaxation techniques promoted   verbalization of feelings encouraged  Medication Review/Management: medications reviewed     Problem: Diabetes Comorbidity  Goal: Blood Glucose Level Within Targeted Range  Outcome: Ongoing, Progressing     Problem: Heart Failure Comorbidity  Goal: Maintenance of Heart Failure Symptom Control  Outcome: Ongoing, Progressing  Intervention: Maintain Heart Failure-Management  Recent Flowsheet Documentation  Taken 8/2/2024 1200 by Ned Lovell RN  Medication Review/Management: medications reviewed     Problem: Hypertension Comorbidity  Goal: Blood Pressure in Desired Range  Outcome: Ongoing, Progressing  Intervention: Maintain Blood  Pressure Management  Recent Flowsheet Documentation  Taken 8/2/2024 1200 by Ned Lovell RN  Syncope Management:   legs elevated   position changed slowly  Medication Review/Management: medications reviewed     Problem: Obstructive Sleep Apnea Risk or Actual Comorbidity Management  Goal: Unobstructed Breathing During Sleep  Outcome: Ongoing, Progressing     Problem: Osteoarthritis Comorbidity  Goal: Maintenance of Osteoarthritis Symptom Control  Outcome: Ongoing, Progressing  Intervention: Maintain Osteoarthritis Symptom Control  Recent Flowsheet Documentation  Taken 8/2/2024 1200 by Ned Lovell RN  Activity Management:   activity encouraged   ambulated in room  Medication Review/Management: medications reviewed     Problem: Pain Chronic (Persistent) (Comorbidity Management)  Goal: Acceptable Pain Control and Functional Ability  Outcome: Ongoing, Progressing  Intervention: Manage Persistent Pain  Recent Flowsheet Documentation  Taken 8/2/2024 1200 by Ned Lovell RN  Sleep/Rest Enhancement:   awakenings minimized   consistent schedule promoted   natural light exposure provided   regular sleep/rest pattern promoted   relaxation techniques promoted   therapeutic touch utilized  Medication Review/Management: medications reviewed  Intervention: Optimize Psychosocial Wellbeing  Recent Flowsheet Documentation  Taken 8/2/2024 1200 by Ned Lovell RN  Supportive Measures:   active listening utilized   decision-making supported   positive reinforcement provided   relaxation techniques promoted   verbalization of feelings encouraged  Diversional Activities: smartphone  Spiritual Activities Assistance:   affirmation provided   personal rituals encouraged  Family/Support System Care:   involvement promoted   self-care encouraged     Problem: Seizure Disorder Comorbidity  Goal: Maintenance of Seizure Control  Outcome: Ongoing, Progressing  Intervention: Maintain Seizure-Symptom Control  Recent Flowsheet  Documentation  Taken 8/2/2024 1200 by Ned Lovell RN  Seizure Precautions:   activity supervised   clutter-free environment maintained     Problem: Adjustment to Illness (Sepsis/Septic Shock)  Goal: Optimal Coping  Outcome: Ongoing, Progressing  Intervention: Optimize Psychosocial Adjustment to Illness  Recent Flowsheet Documentation  Taken 8/2/2024 1200 by Ned Lovell RN  Supportive Measures:   active listening utilized   decision-making supported   positive reinforcement provided   relaxation techniques promoted   verbalization of feelings encouraged  Family/Support System Care:   involvement promoted   self-care encouraged     Problem: Bleeding (Sepsis/Septic Shock)  Goal: Absence of Bleeding  Outcome: Ongoing, Progressing     Problem: Glycemic Control Impaired (Sepsis/Septic Shock)  Goal: Blood Glucose Level Within Desired Range  Outcome: Ongoing, Progressing     Problem: Infection Progression (Sepsis/Septic Shock)  Goal: Absence of Infection Signs and Symptoms  Outcome: Ongoing, Progressing  Intervention: Initiate Sepsis Management  Recent Flowsheet Documentation  Taken 8/2/2024 1200 by Ned Lovell RN  Infection Management: aseptic technique maintained  Infection Prevention:   environmental surveillance performed   equipment surfaces disinfected   hand hygiene promoted   personal protective equipment utilized   rest/sleep promoted   single patient room provided  Intervention: Promote Recovery  Recent Flowsheet Documentation  Taken 8/2/2024 1200 by Ned Lovell RN  Activity Management:   activity encouraged   ambulated in room  Airway/Ventilation Support:   comfort measures provided   cough relief provided   dyspnea relief promoted   humidification applied   pulmonary hygiene promoted  Sleep/Rest Enhancement:   awakenings minimized   consistent schedule promoted   natural light exposure provided   regular sleep/rest pattern promoted   relaxation techniques promoted   therapeutic touch  utilized  Intervention: Promote Stabilization  Recent Flowsheet Documentation  Taken 8/2/2024 1200 by Ned Lovell RN  Lung Protection Measures:   fluid excess minimized   lung compliance monitored     Problem: Nutrition Impaired (Sepsis/Septic Shock)  Goal: Optimal Nutrition Intake  Outcome: Ongoing, Progressing     Problem: Pain Acute  Goal: Acceptable Pain Control and Functional Ability  Outcome: Ongoing, Progressing  Intervention: Prevent or Manage Pain  Recent Flowsheet Documentation  Taken 8/2/2024 1200 by Ned Lovell RN  Sleep/Rest Enhancement:   awakenings minimized   consistent schedule promoted   natural light exposure provided   regular sleep/rest pattern promoted   relaxation techniques promoted   therapeutic touch utilized  Medication Review/Management: medications reviewed  Intervention: Optimize Psychosocial Wellbeing  Recent Flowsheet Documentation  Taken 8/2/2024 1200 by Ned Lovell RN  Supportive Measures:   active listening utilized   decision-making supported   positive reinforcement provided   relaxation techniques promoted   verbalization of feelings encouraged  Diversional Activities: smartphone  Spiritual Activities Assistance:   affirmation provided   personal rituals encouraged     Problem: Fall Injury Risk  Goal: Absence of Fall and Fall-Related Injury  Outcome: Ongoing, Progressing  Intervention: Identify and Manage Contributors  Recent Flowsheet Documentation  Taken 8/2/2024 1200 by Ned Lovell RN  Medication Review/Management: medications reviewed  Intervention: Promote Injury-Free Environment  Recent Flowsheet Documentation  Taken 8/2/2024 1200 by Ned Lovell RN  Safety Promotion/Fall Prevention:   activity supervised   nonskid shoes/slippers when out of bed   room organization consistent   safety round/check completed     Problem: Infection  Goal: Absence of Infection Signs and Symptoms  Outcome: Ongoing, Progressing  Intervention: Prevent or Manage  Infection  Recent Flowsheet Documentation  Taken 8/2/2024 1200 by Ned Lovell RN  Infection Management: aseptic technique maintained     Problem: Gas Exchange Impaired  Goal: Optimal Gas Exchange  Outcome: Ongoing, Progressing  Intervention: Optimize Oxygenation and Ventilation  Recent Flowsheet Documentation  Taken 8/2/2024 1200 by Ned Lovell RN  Head of Bed (HOB) Positioning: HOB elevated     Problem: Breathing Pattern Ineffective  Goal: Effective Breathing Pattern  Outcome: Ongoing, Progressing  Intervention: Promote Improved Breathing Pattern  Recent Flowsheet Documentation  Taken 8/2/2024 1200 by Ned Lovell RN  Supportive Measures:   active listening utilized   decision-making supported   positive reinforcement provided   relaxation techniques promoted   verbalization of feelings encouraged  Head of Bed (HOB) Positioning: HOB elevated   Goal Outcome Evaluation:  Plan of Care Reviewed With: patient        Progress: no change

## 2024-08-02 NOTE — ED PROVIDER NOTES
Subjective   History of Present Illness  Mrs. Quintero arrives by ambulance with shortness of breath.  She reports beginning to feel short of breath yesterday.  She saw her primary care provider.  Had chest x-ray which did not show any acute problem.  She has history of COPD and asthma.  She wears oxygen at night.  She has subjective fevers and chills.  She tells me she has not coughing anymore than usual.      Review of Systems    Past Medical History:   Diagnosis Date    Abnormal Pap smear of cervix     Allergic rhinitis     Asthma     Atypical chest pain     Brain tumor     Status post brain tumor resection in 1989.    Candidiasis of mouth     Cervical high risk HPV (human papillomavirus) test positive 09/01/2020    CKD (chronic kidney disease), stage III     Colon polyps 09/09/2020    sessile and tubular adenoma    Conversion disorder     COPD (chronic obstructive pulmonary disease)     COVID-19     Elevated liver enzymes     Former smoker 07/05/2023    H/O Helicobacter infection      Status post EGD 9/4/2012, pathology revealed H. pylori infection.    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     Neurology evaluation thought it was numbness from carpal tunnel and not Guillain-Holly    Hyperlipidemia     Hypertension     Influenza     Internal hemorrhoids     Low grade squamous intraepith lesion on cytologic smear cervix (lgsil) 09/01/2020    Median neuropathy     Migraine     PTSD (post-traumatic stress disorder)     Seizures     2 seizures after surgery    Small fiber neuropathy     Stroke     Tinea corporis        Allergies   Allergen Reactions    Codeine Hives    Influenza Vaccines Other (See Comments)     Got GB syndrome    Propofol Other (See Comments)     Propofol infusion reaction, weakness, tremors, paralysis       Past Surgical History:   Procedure Laterality Date    BRAIN SURGERY      status post brain tumor resection    CARDIAC CATHETERIZATION N/A  2023    Procedure: Left Heart Cath;  Surgeon: Ryder Delarosa III, MD;  Location: [facility] CATH INVASIVE LOCATION;  Service: Cardiovascular;  Laterality: N/A;    COLONOSCOPY      polyp removal    LIVER BIOPSY      UPPER GASTROINTESTINAL ENDOSCOPY      Status post EGD 2012, pathology revealed H. pylori infection.       Family History   Adopted: Yes   Problem Relation Age of Onset    Lung disease Mother     Heart disease Mother     Stroke Mother     Throat cancer Mother     Uterine cancer Mother     Ovarian cancer Mother     Prostate cancer Father     No Known Problems Sister     No Known Problems Brother     Breast cancer Other     Diabetes Other     Colon cancer Other        Social History     Socioeconomic History    Marital status: Legally    Tobacco Use    Smoking status: Every Day     Current packs/day: 0.00     Average packs/day: 0.5 packs/day for 41.7 years (20.9 ttl pk-yrs)     Types: Cigarettes     Start date: 1981     Last attempt to quit: 2022     Years since quittin.8     Passive exposure: Current    Smokeless tobacco: Never    Tobacco comments:     quit with chantix in past   Vaping Use    Vaping status: Never Used   Substance and Sexual Activity    Alcohol use: No    Drug use: No    Sexual activity: Yes     Partners: Male     Birth control/protection: Post-menopausal           Objective   Physical Exam  Vitals and nursing note reviewed.   Constitutional:       General: She is not in acute distress.     Appearance: Normal appearance.   HENT:      Head: Normocephalic and atraumatic.      Nose: Nose normal. No congestion or rhinorrhea.   Eyes:      General: No scleral icterus.     Conjunctiva/sclera: Conjunctivae normal.   Neck:      Comments: No JVD   Cardiovascular:      Rate and Rhythm: Normal rate and regular rhythm.      Heart sounds: No murmur heard.     No friction rub.   Pulmonary:      Effort: Pulmonary effort is normal.      Breath sounds: Decreased breath sounds and  wheezing present. No rales.      Comments: She has decreased air movement, inspiratory and expiratory wheezing  Musculoskeletal:         General: No tenderness.      Cervical back: Normal range of motion and neck supple.      Right lower leg: No edema.      Left lower leg: No edema.   Skin:     General: Skin is warm and dry.      Coloration: Skin is not pale.      Findings: No erythema.   Neurological:      General: No focal deficit present.      Mental Status: She is alert and oriented to person, place, and time.      Motor: No weakness.      Coordination: Coordination normal.   Psychiatric:         Mood and Affect: Mood normal.         Behavior: Behavior normal.         Thought Content: Thought content normal.         Procedures           ED Course  ED Course as of 08/02/24 0745   Fri Aug 02, 2024   0710 Chest x-ray and labs unremarkable other than white blood cell count of 14,000.  Will observe her for couple of hours and if she improves will discharge on steroids. [DT]   0733 88% on 4 L nasal cannula oxygen.  Still having inspiratory and expiratory wheezing.  Will admit.  Ordered nicotine patch. [DT]      ED Course User Index  [DT] Shlomo Patel MD                                             Medical Decision Making  Please see course notes.  I gave IV medication and nebulized tell Butor all and nebulized Atrovent.  Observed her for couple of hours and had reevaluation.  Ordered and interpreted multiple labs, EKG, chest x-ray.    Problems Addressed:  Acute respiratory failure with hypoxia: complicated acute illness or injury that poses a threat to life or bodily functions  COPD exacerbation: complicated acute illness or injury that poses a threat to life or bodily functions    Amount and/or Complexity of Data Reviewed  External Data Reviewed: notes.  Labs: ordered. Decision-making details documented in ED Course.  Radiology: ordered. Decision-making details documented in ED Course.  ECG/medicine tests:  ordered. Decision-making details documented in ED Course.    Risk  OTC drugs.  Prescription drug management.  Decision regarding hospitalization.        Final diagnoses:   COPD exacerbation   Acute respiratory failure with hypoxia       ED Disposition  ED Disposition       ED Disposition   Decision to Admit    Condition   --    Comment   --               No follow-up provider specified.       Medication List      No changes were made to your prescriptions during this visit.            Shlomo Patel MD  08/02/24 0730

## 2024-08-02 NOTE — H&P
[facility] Medicine Services  HISTORY AND PHYSICAL    Patient Name: Arcelia Quintero  : 1970  MRN: [id number]  Primary Care Physician: Coby Ely PA-C  Date of admission: 2024      Subjective   Subjective     Chief Complaint:  SOB    HPI:  Arcelia Quintero is a 54 y.o. female with past medical history of essential hypertension, dyslipidemia, CKD stage III, GERD, COPD/emphysema, migraine headache, on Topamax, seizure disorder (follows with Dr. Tello, ultimately felt to have PNES), conversion disorder, rain tumor s/p resection (data deficit), recent hospitalization to [facility] in 2024 for respiratory failure requiring intubation mechanical ventilation who presented to the hospital today with worsening shortness of breath x 2 days    Patient reported that she was at her normal state of health till 2 days ago.  She started having gradual worsening shortness of breath, worsening wheezing, increased cough of yellowish brownish-greenish sputum.  Woke up overnight severely short of breath despite using her nocturnal oxygen.  Denies any chest pain.  Denies any fever.  No abdominal pain, diarrhea etc.  She continues to smoke cigarettes, a pack a day    In ER, she was hypoxic, satting 85% on room air.  Improved to 92% on 3 L nasal cannula.  Blood workup showing WBC of 14.4, creatinine of 1.08 slightly up from her baseline of 0.75.  Chest x-ray with no acute infiltrates.  COVID test negative.  She will be admitted to hospitalist service for further management      Personal History     Past Medical History:   Diagnosis Date    Abnormal Pap smear of cervix     Allergic rhinitis     Asthma     Atypical chest pain     Brain tumor     Status post brain tumor resection in .    Candidiasis of mouth     Cervical high risk HPV (human papillomavirus) test positive 2020    CKD (chronic kidney disease), stage III     Colon polyps 2020    sessile and  tubular adenoma    Conversion disorder     COPD (chronic obstructive pulmonary disease)     COVID-19     Elevated liver enzymes     Former smoker 07/05/2023    GERD (gastroesophageal reflux disease)     H/O Helicobacter infection      Status post EGD 9/4/2012, pathology revealed H. pylori infection.    Headache     migraines    History of Guillain-Palouse syndrome due to influenza immunization     Neurology evaluation thought it was numbness from carpal tunnel and not Guillain-Palouse    Hyperlipidemia     Hypertension     Influenza     Internal hemorrhoids     Low grade squamous intraepith lesion on cytologic smear cervix (lgsil) 09/01/2020    Median neuropathy     Migraine     PTSD (post-traumatic stress disorder)     Seizures     2 seizures after surgery    Small fiber neuropathy     Stroke     Tinea corporis            Past Surgical History:   Procedure Laterality Date    BRAIN SURGERY      status post brain tumor resection    CARDIAC CATHETERIZATION N/A 12/27/2023    Procedure: Left Heart Cath;  Surgeon: Ryder Delarosa III, MD;  [facility];  Service: Cardiovascular;  Laterality: N/A;    COLONOSCOPY      polyp removal    LIVER BIOPSY      UPPER GASTROINTESTINAL ENDOSCOPY      Status post EGD 9/4/2012, pathology revealed H. pylori infection.       Family History: family history includes Breast cancer in an other family member; Colon cancer in an other family member; Diabetes in an other family member; Heart disease in her mother; Lung disease in her mother; No Known Problems in her brother and sister; Ovarian cancer in her mother; Prostate cancer in her father; Stroke in her mother; Throat cancer in her mother; Uterine cancer in her mother. She was adopted.     Social History:  reports that she has been smoking cigarettes. She started smoking about 43 years ago. She has a 20.9 pack-year smoking history. She has been exposed to tobacco smoke. She has never used smokeless tobacco. She  reports that she does not drink alcohol and does not use drugs.  Social History     Social History Narrative    Not on file       Medications:  Available home medication information reviewed.  Budeson-Glycopyrrol-Formoterol, FLUoxetine, albuterol sulfate HFA, amLODIPine, aspirin, atorvastatin, carvedilol, doxycycline, lactulose, levETIRAcetam, linaclotide, losartan, mirtazapine, nystatin, pantoprazole, and rizatriptan    Allergies   Allergen Reactions    Codeine Hives    Influenza Vaccines Other (See Comments)     Got GB syndrome    Propofol Other (See Comments)     Propofol infusion reaction, weakness, tremors, paralysis       Objective   Objective     Vital Signs:   Temp:  [96.6 °F (35.9 °C)-98.1 °F (36.7 °C)] 96.6 °F (35.9 °C)  Heart Rate:  [56-73] 66  Resp:  [22-24] 24  BP: (148-163)/() 161/87  Flow (L/min):  [4-5] 5       Physical Exam   General: In respiratory distress, using accessory muscles of breathing, conversant but cannot complete 3 word sentence  Head: Atraumatic and normocephalic  Eyes: No Icterus. No pallor  Ears:  Ears appear intact with no abnormalities noted  Throat: No oral lesions, no thrush  Neck: Supple, trachea midline  Lungs: Moderate to severe respiratory distress, using accessory muscle breathing, cannot complete 3 word sentence.  Extensive wheezing bilaterally.  Diminished air entry both lung fields  Heart:  Normal S1 and S2, no murmur, no gallop, No JVD, no lower extremity swelling  Abdomen:  Soft, no tenderness, no organomegaly, normal bowel sounds, no organomegaly  Extremities: pulses equal bilaterally  Skin: No bleeding, bruising or rash, normal skin turgor and elasticity  Neurologic: Cranial nerves appear intact with no evidence of facial asymmetry, normal motor and sensory functions in all 4 extremities  Psych: Alert and oriented x 3, normal mood    Result Review:  I have personally reviewed the results from the time of this admission to 8/2/2024 11:36 EDT and agree with these  findings:  [x]  Laboratory list / accordion  [x]  Microbiology  []  Radiology  [x]  EKG/Telemetry   []  Cardiology/Vascular   [x]  Pathology  [x]  Old records      LAB RESULTS:      Lab 08/02/24  0549   WBC 14.42*   HEMOGLOBIN 15.1   HEMATOCRIT 45.6   PLATELETS 213   NEUTROS ABS 9.13*   IMMATURE GRANS (ABS) 0.05   LYMPHS ABS 3.99*   MONOS ABS 0.82   EOS ABS 0.32   MCV 90.7   PROCALCITONIN 0.02   D DIMER QUANT 0.41         Lab 08/02/24  0549   SODIUM 141   POTASSIUM 4.2   CHLORIDE 103   CO2 29.0   ANION GAP 9.0   BUN 7   CREATININE 1.08*   EGFR 61.5   GLUCOSE 144*   CALCIUM 9.3         Lab 08/02/24  0549   TOTAL PROTEIN 6.8   ALBUMIN 4.0   GLOBULIN 2.8   ALT (SGPT) 18   AST (SGOT) 19   BILIRUBIN 0.3   ALK PHOS 133*   LIPASE 25         Lab 08/02/24  0549   PROBNP 65.0   HSTROP T 7                 UA          4/2/2024    15:13 4/3/2024    10:25 4/6/2024    06:09   Urinalysis   Squamous Epithelial Cells, UA  0-2     Specific Gravity, UA 1.018  1.059  1.010    Ketones, UA Negative  Negative  Negative    Blood, UA Negative  Large (3+)  Negative    Leukocytes, UA Negative  Negative  Negative    Nitrite, UA Negative  Negative  Negative    RBC, UA  11-20     WBC, UA  3-5     Bacteria, UA  1+         Microbiology Results (last 10 days)       Procedure Component Value - Date/Time    COVID PRE-OP / PRE-PROCEDURE SCREENING ORDER (NO ISOLATION) - Swab, Nasopharynx [[id number]]  (Normal) Collected: 08/02/24 0554    Lab Status: Final result Specimen: Swab from Nasopharynx Updated: 08/02/24 0625    Narrative:      The following orders were created for panel order COVID PRE-OP / PRE-PROCEDURE SCREENING ORDER (NO ISOLATION) - Swab, Nasopharynx.  Procedure                               Abnormality         Status                     ---------                               -----------         ------                     COVID-19 and FLU A/B PCR...[[id number]]  Normal              Final result                 Please view results for these  tests on the individual orders.    COVID-19 and FLU A/B PCR, 1 HR TAT - Swab, Nasopharynx [[id number]]  (Normal) Collected: 08/02/24 0554    Lab Status: Final result Specimen: Swab from Nasopharynx Updated: 08/02/24 0625     COVID19 Not Detected     Influenza A PCR Not Detected     Influenza B PCR Not Detected    Narrative:      Fact sheet for providers: https://www.fda.gov/media/896190/download    Fact sheet for patients: https://www.fda.gov/media/302961/download    Test performed by PCR.            XR Chest 1 View    Result Date: 8/2/2024  XR CHEST 1 VW Date of Exam: 8/2/2024 6:06 AM EDT Indication: SOA triage protocol Comparison: 8/1/2024. Findings: The lungs are hyperexpanded. There is no pneumothorax, pleural effusion or focal airspace consolidation. Heart size and pulmonary vasculature appear within normal limits. Regional bones appear intact.     Impression: Impression: Findings suggestive of COPD without acute cardiopulmonary abnormality. Electronically Signed: Ned Forrester MD  8/2/2024 6:13 AM EDT  Workstation ID: [id number]    XR Chest PA & Lateral    Result Date: 8/1/2024  XR CHEST PA AND LATERAL Date of Exam: 8/1/2024 11:54 AM EDT Indication: chronic cough Comparison: Chest radiograph 4/4/2024. Findings: Cardiomediastinal silhouette is within normal limits. No focal consolidation or overt pulmonary edema. No pleural effusion or pneumothorax. Osseous structures are unremarkable.     Impression: Impression: No evidence of acute cardiopulmonary disease. Electronically Signed: Freddie Sandoval MD  8/1/2024 3:36 PM EDT  Workstation ID: [id number]     Results for orders placed during the hospital encounter of 04/02/24    Adult Transthoracic Echo Complete W/ Cont if Necessary Per Protocol    Interpretation Summary    Left ventricular systolic function is normal. Calculated left ventricular EF = 62.8%    Insufficient TR velocity profile to estimate the right ventricular systolic pressure.    Normal left atrial size  and volume noted.      Assessment & Plan   Assessment & Plan       COPD exacerbation    Acute on chronic respiratory failure with hypoxia        Summary:  Arcelia Quintero is a 54 y.o. female with past medical history of essential hypertension, dyslipidemia, CKD stage III, GERD, COPD/emphysema, migraine headache, on Topamax, seizure disorder (follows with Dr. Tello, ultimately felt to have PNES), conversion disorder, rain tumor s/p resection (data deficit), recent hospitalization to [facility] in April 2024 for respiratory failure requiring intubation mechanical ventilation who presented to the hospital today with worsening shortness of breath x 2 days    Acute on chronic hypoxic hypercapnic respiratory failure  Acute severe asthma/COPD exacerbation  Patient presented with worsening shortness of breath, severe wheezing and respiratory distress x 2 days  Hypoxic, satting 85% on room air in ER.  On nocturnal oxygen only at home  D-dimer is negative.  Chest x-ray with no acute infiltrates  Follow sputum culture and respiratory panel  Start IV Rocephin and doxycycline given her increase colored sputum production  Scheduled and as needed nebulizer treatment  IV Solu-Medrol 40 every 8  Mucinex, I-S, pulmonary toilet  Spiriva and Brovana  Holding Coreg given her severe bronchospasm    Essential hypertension  Dyslipidemia  Continue amlodipine and losartan  Continue statins    Ongoing tobacco use  Nicotine patch  Counseling provided    PNES  Migraine headache  Conversion disorder  Continue Topamax continue mirtazapine        VTE Prophylaxis:  Pharmacologic VTE prophylaxis orders are present.          CODE STATUS:    Code Status and Medical Interventions: CPR (Attempt to Resuscitate); Full Support   Ordered at: 08/02/24 0821     Level Of Support Discussed With:    Patient     Code Status (Patient has no pulse and is not breathing):    CPR (Attempt to Resuscitate)     Medical Interventions (Patient has pulse or is  breathing):    Full Support       Expected Discharge   Expected Discharge Date: 8/5/2024; Expected Discharge Time:      Ruth Rahman MD  08/02/24

## 2024-08-02 NOTE — CASE MANAGEMENT/SOCIAL WORK
Discharge Planning Assessment  [facility]     Patient Name: Arcelia Quintero  MRN: [id number]  Today's Date: 8/2/2024    Admit Date: 8/2/2024    Plan: Initial   Discharge Needs Assessment       Row Name 08/02/24 0852       Living Environment    People in Home child(collin), adult    Name(s) of People in Home CODY GIL Daughter   [phone]    Current Living Arrangements home    Potentially Unsafe Housing Conditions none    In the past 12 months has the electric, gas, oil, or water company threatened to shut off services in your home? No    Primary Care Provided by self    Provides Primary Care For no one    Family Caregiver if Needed child(collin), adult    Family Caregiver Names CODY GIL Daughter   [phone]    Quality of Family Relationships helpful;involved    Able to Return to Prior Arrangements yes       Resource/Environmental Concerns    Resource/Environmental Concerns none    Transportation Concerns none       Transportation Needs    In the past 12 months, has lack of transportation kept you from medical appointments or from getting medications? no    In the past 12 months, has lack of transportation kept you from meetings, work, or from getting things needed for daily living? No       Food Insecurity    Within the past 12 months, you worried that your food would run out before you got the money to buy more. Never true    Within the past 12 months, the food you bought just didn't last and you didn't have money to get more. Never true       Transition Planning    Patient/Family Anticipates Transition to home with family    Patient/Family Anticipated Services at Transition none    Transportation Anticipated family or friend will provide       Discharge Needs Assessment    Readmission Within the Last 30 Days no previous admission in last 30 days    Equipment Currently Used at Home oxygen;nebulizer    Concerns to be Addressed denies needs/concerns at this time    Anticipated Changes Related to Illness  none    Equipment Needed After Discharge none    Current Discharge Risk chronically ill                   Discharge Plan       Row Name 08/02/24 0853       Plan    Plan Initial    Patient/Family in Agreement with Plan yes    Plan Comments CM spoke with patient at bedside regarding DC planning. Patient resides in [facility] with her daughter. Patient is independent with ADL's, denies any DME. Patient is on home O2 qhs @ 2lpm provided by [employer] and has a nebulizer machine. Patient denies any current home health or outpatient services. Patient has used [facility] in the past. Patient has medical insurance, prescription coverage and is able to afford/obtain medications without difficulty. Patient has no advanced directives. Patient denies any discharge planning needs. Goal is home. CM will continue to follow    Final Discharge Disposition Code 30 - still a patient                  Continued Care and Services - Admitted Since 8/2/2024    No active coordination exists for this encounter.          Demographic Summary       Row Name 08/02/24 0851       General Information    Arrived From home    Referral Source emergency department    Reason for Consult discharge planning    Preferred Language English       Contact Information    Contact Information Comments CODY GIL Daughter   [phone]                   Functional Status       Row Name 08/02/24 0852       Functional Status    Usual Activity Tolerance good    Current Activity Tolerance moderate       Physical Activity    On average, how many days per week do you engage in moderate to strenuous exercise (like a brisk walk)? 0 days    On average, how many minutes do you engage in exercise at this level? 0 min    Number of minutes of exercise per week 0       Assessment of Health Literacy    How often do you have someone help you read hospital materials? Never    How often do you have problems learning about your medical condition because of difficulty  understanding written information? Never    How often do you have a problem understanding what is told to you about your medical condition? Never    How confident are you filling out medical forms by yourself? Quite a bit    Health Literacy Good       Functional Status, IADL    Medications independent    Meal Preparation independent    Housekeeping independent    Laundry independent    Shopping independent       Mental Status    General Appearance WDL WDL       Mental Status Summary    Recent Changes in Mental Status/Cognitive Functioning no changes       Employment/    Employment Status employed full-time                   Psychosocial    No documentation.                  Abuse/Neglect    No documentation.                  Legal    No documentation.                  Substance Abuse    No documentation.                  Patient Forms    No documentation.                     Sera Leonard RN

## 2024-08-03 ENCOUNTER — READMISSION MANAGEMENT (OUTPATIENT)
Dept: CALL CENTER | Facility: HOSPITAL | Age: 54
End: 2024-08-03
Payer: MEDICARE

## 2024-08-03 VITALS
WEIGHT: 146 LBS | BODY MASS INDEX: 22.91 KG/M2 | OXYGEN SATURATION: 99 % | TEMPERATURE: 97.5 F | HEIGHT: 67 IN | RESPIRATION RATE: 20 BRPM | SYSTOLIC BLOOD PRESSURE: 141 MMHG | HEART RATE: 60 BPM | DIASTOLIC BLOOD PRESSURE: 90 MMHG

## 2024-08-03 LAB
ALBUMIN SERPL-MCNC: 3.9 G/DL (ref 3.5–5.2)
ALBUMIN/GLOB SERPL: 1.9 G/DL
ALP SERPL-CCNC: 113 U/L (ref 39–117)
ALT SERPL W P-5'-P-CCNC: 12 U/L (ref 1–33)
ANION GAP SERPL CALCULATED.3IONS-SCNC: 13 MMOL/L (ref 5–15)
AST SERPL-CCNC: 8 U/L (ref 1–32)
BASOPHILS # BLD AUTO: 0.01 10*3/MM3 (ref 0–0.2)
BASOPHILS NFR BLD AUTO: 0.1 % (ref 0–1.5)
BILIRUB SERPL-MCNC: 0.2 MG/DL (ref 0–1.2)
BUN SERPL-MCNC: 13 MG/DL (ref 6–20)
BUN/CREAT SERPL: 14.4 (ref 7–25)
CALCIUM SPEC-SCNC: 8.9 MG/DL (ref 8.6–10.5)
CHLORIDE SERPL-SCNC: 106 MMOL/L (ref 98–107)
CO2 SERPL-SCNC: 23 MMOL/L (ref 22–29)
CREAT SERPL-MCNC: 0.9 MG/DL (ref 0.57–1)
DEPRECATED RDW RBC AUTO: 42.8 FL (ref 37–54)
EGFRCR SERPLBLD CKD-EPI 2021: 76.6 ML/MIN/1.73
EOSINOPHIL # BLD AUTO: 0 10*3/MM3 (ref 0–0.4)
EOSINOPHIL NFR BLD AUTO: 0 % (ref 0.3–6.2)
ERYTHROCYTE [DISTWIDTH] IN BLOOD BY AUTOMATED COUNT: 13.1 % (ref 12.3–15.4)
GLOBULIN UR ELPH-MCNC: 2.1 GM/DL
GLUCOSE SERPL-MCNC: 156 MG/DL (ref 65–99)
HCT VFR BLD AUTO: 41.2 % (ref 34–46.6)
HGB BLD-MCNC: 13.6 G/DL (ref 12–15.9)
IMM GRANULOCYTES # BLD AUTO: 0.05 10*3/MM3 (ref 0–0.05)
IMM GRANULOCYTES NFR BLD AUTO: 0.5 % (ref 0–0.5)
LYMPHOCYTES # BLD AUTO: 1.34 10*3/MM3 (ref 0.7–3.1)
LYMPHOCYTES NFR BLD AUTO: 14 % (ref 19.6–45.3)
MAGNESIUM SERPL-MCNC: 2.1 MG/DL (ref 1.6–2.6)
MCH RBC QN AUTO: 29.4 PG (ref 26.6–33)
MCHC RBC AUTO-ENTMCNC: 33 G/DL (ref 31.5–35.7)
MCV RBC AUTO: 89 FL (ref 79–97)
MONOCYTES # BLD AUTO: 0.29 10*3/MM3 (ref 0.1–0.9)
MONOCYTES NFR BLD AUTO: 3 % (ref 5–12)
NEUTROPHILS NFR BLD AUTO: 7.89 10*3/MM3 (ref 1.7–7)
NEUTROPHILS NFR BLD AUTO: 82.4 % (ref 42.7–76)
NRBC BLD AUTO-RTO: 0 /100 WBC (ref 0–0.2)
PHOSPHATE SERPL-MCNC: 3.3 MG/DL (ref 2.5–4.5)
PLATELET # BLD AUTO: 174 10*3/MM3 (ref 140–450)
PMV BLD AUTO: 12.6 FL (ref 6–12)
POTASSIUM SERPL-SCNC: 4.4 MMOL/L (ref 3.5–5.2)
PROT SERPL-MCNC: 6 G/DL (ref 6–8.5)
RBC # BLD AUTO: 4.63 10*6/MM3 (ref 3.77–5.28)
SODIUM SERPL-SCNC: 142 MMOL/L (ref 136–145)
WBC NRBC COR # BLD AUTO: 9.58 10*3/MM3 (ref 3.4–10.8)

## 2024-08-03 PROCEDURE — 94799 UNLISTED PULMONARY SVC/PX: CPT

## 2024-08-03 PROCEDURE — 84100 ASSAY OF PHOSPHORUS: CPT | Performed by: INTERNAL MEDICINE

## 2024-08-03 PROCEDURE — 80053 COMPREHEN METABOLIC PANEL: CPT | Performed by: INTERNAL MEDICINE

## 2024-08-03 PROCEDURE — 85025 COMPLETE CBC W/AUTO DIFF WBC: CPT | Performed by: INTERNAL MEDICINE

## 2024-08-03 PROCEDURE — 25010000002 CEFTRIAXONE PER 250 MG: Performed by: INTERNAL MEDICINE

## 2024-08-03 PROCEDURE — 83735 ASSAY OF MAGNESIUM: CPT | Performed by: INTERNAL MEDICINE

## 2024-08-03 PROCEDURE — 25010000002 ENOXAPARIN PER 10 MG: Performed by: INTERNAL MEDICINE

## 2024-08-03 PROCEDURE — 94761 N-INVAS EAR/PLS OXIMETRY MLT: CPT

## 2024-08-03 PROCEDURE — 94664 DEMO&/EVAL PT USE INHALER: CPT

## 2024-08-03 PROCEDURE — 25010000002 METHYLPREDNISOLONE PER 40 MG: Performed by: INTERNAL MEDICINE

## 2024-08-03 PROCEDURE — 99238 HOSP IP/OBS DSCHRG MGMT 30/<: CPT | Performed by: INTERNAL MEDICINE

## 2024-08-03 RX ORDER — CEFDINIR 300 MG/1
300 CAPSULE ORAL 2 TIMES DAILY
Qty: 10 CAPSULE | Refills: 0 | Status: SHIPPED | OUTPATIENT
Start: 2024-08-03 | End: 2024-08-08

## 2024-08-03 RX ORDER — DOXYCYCLINE HYCLATE 100 MG/1
100 CAPSULE ORAL 2 TIMES DAILY
Qty: 10 CAPSULE | Refills: 0 | Status: SHIPPED | OUTPATIENT
Start: 2024-08-03 | End: 2024-08-08

## 2024-08-03 RX ORDER — PREDNISONE 20 MG/1
40 TABLET ORAL DAILY
Qty: 10 TABLET | Refills: 0 | Status: SHIPPED | OUTPATIENT
Start: 2024-08-03 | End: 2024-08-08

## 2024-08-03 RX ADMIN — PANTOPRAZOLE SODIUM 40 MG: 40 TABLET, DELAYED RELEASE ORAL at 08:25

## 2024-08-03 RX ADMIN — TOPIRAMATE 25 MG: 25 TABLET, FILM COATED ORAL at 08:25

## 2024-08-03 RX ADMIN — ATORVASTATIN CALCIUM 40 MG: 40 TABLET, FILM COATED ORAL at 08:25

## 2024-08-03 RX ADMIN — TIOTROPIUM BROMIDE INHALATION SPRAY 2 PUFF: 3.12 SPRAY, METERED RESPIRATORY (INHALATION) at 08:09

## 2024-08-03 RX ADMIN — ASPIRIN 81 MG: 81 TABLET, COATED ORAL at 08:25

## 2024-08-03 RX ADMIN — LOSARTAN POTASSIUM 50 MG: 50 TABLET, FILM COATED ORAL at 08:26

## 2024-08-03 RX ADMIN — AMLODIPINE BESYLATE 10 MG: 10 TABLET ORAL at 08:25

## 2024-08-03 RX ADMIN — DOXYCYCLINE 100 MG: 100 INJECTION, POWDER, LYOPHILIZED, FOR SOLUTION INTRAVENOUS at 08:21

## 2024-08-03 RX ADMIN — SODIUM CHLORIDE 2000 MG: 900 INJECTION INTRAVENOUS at 08:25

## 2024-08-03 RX ADMIN — FLUOXETINE HYDROCHLORIDE 20 MG: 20 CAPSULE ORAL at 08:25

## 2024-08-03 RX ADMIN — NICOTINE 1 PATCH: 21 PATCH, EXTENDED RELEASE TRANSDERMAL at 08:26

## 2024-08-03 RX ADMIN — ARFORMOTEROL TARTRATE 15 MCG: 15 SOLUTION RESPIRATORY (INHALATION) at 08:08

## 2024-08-03 RX ADMIN — IPRATROPIUM BROMIDE AND ALBUTEROL SULFATE 3 ML: 2.5; .5 SOLUTION RESPIRATORY (INHALATION) at 08:08

## 2024-08-03 RX ADMIN — ENOXAPARIN SODIUM 40 MG: 100 INJECTION SUBCUTANEOUS at 08:25

## 2024-08-03 RX ADMIN — GUAIFENESIN 1200 MG: 600 TABLET, EXTENDED RELEASE ORAL at 08:25

## 2024-08-03 RX ADMIN — METHYLPREDNISOLONE SODIUM SUCCINATE 40 MG: 40 INJECTION, POWDER, FOR SOLUTION INTRAMUSCULAR; INTRAVENOUS at 05:28

## 2024-08-03 RX ADMIN — IPRATROPIUM BROMIDE AND ALBUTEROL SULFATE 3 ML: 2.5; .5 SOLUTION RESPIRATORY (INHALATION) at 00:40

## 2024-08-03 RX ADMIN — Medication 10 ML: at 08:26

## 2024-08-03 NOTE — OUTREACH NOTE
Prep Survey      Flowsheet Row Responses   [facility] patient discharged from? [place]   Is LACE score < 7 ? No   Eligibility [facility]   Date of Admission 08/02/24   Date of Discharge 08/03/24   Discharge Disposition Home or Self Care   Discharge diagnosis COPD exacerbation   Does the patient have one of the following disease processes/diagnoses(primary or secondary)? COPD   Prep survey completed? Yes            Beba PATINO - Registered Nurse

## 2024-08-03 NOTE — DISCHARGE SUMMARY
[facility] Medicine Services  DISCHARGE SUMMARY    Patient Name: Arcelia Quintero  : 1970  MRN: [id number]    Date of Admission: 2024  5:37 AM  Date of Discharge: 8/3/2024  Primary Care Physician: Coby Ely PA-C    Consults       No orders found for last 30 day(s).            Hospital Course     Presenting Problem:     Active Hospital Problems    Diagnosis  POA    **COPD exacerbation [J44.1]  Yes    Acute on chronic respiratory failure with hypoxia [J96.21]  Yes      Resolved Hospital Problems   No resolved problems to display.          Hospital Course:  Arcelia Quintero is a 54 y.o. female with past medical history of essential hypertension, dyslipidemia, CKD stage III, GERD, COPD/emphysema, migraine headache, on Topamax, seizure disorder (follows with Dr. Tello, ultimately felt to have PNES), conversion disorder, rain tumor s/p resection (data deficit), recent hospitalization to [facility] in 2024 for respiratory failure requiring intubation mechanical ventilation who presented to the hospital today with worsening shortness of breath x 2 days.  She was admitted and treated with IV steroids, nebulizer treatment and IV antibiotics for possible bronchitis and COPD exacerbation.  Made significant improvement.  Requested to be discharged.  I tried to  her to stay another day given how sick she was on admission but she declined and insisted to leave.  She was hemodynamically stable and oxygen saturation was 96% on 2 L nasal cannula.  She does have home O2.  Eventually discharged in a stable condition     Acute on chronic hypoxic hypercapnic respiratory failure  Acute severe asthma/COPD exacerbation  Patient presented with worsening shortness of breath, severe wheezing and respiratory distress x 2 days  Hypoxic, satting 85% on room air in ER.  On nocturnal oxygen only at home  D-dimer is negative.  Chest x-ray with no acute infiltrates  Follow  sputum culture and respiratory panel  Status post IV Rocephin and doxycycline given her increase colored sputum production.  Discharged on p.o. cefdinir and doxycycline x 5 days  Scheduled and as needed nebulizer treatment  Status post IV Solu-Medrol 40 every 8.  Discharged on prednisone 40 mg daily x 5 days  Mucinex, I-S, pulmonary toilet  Continue home inhalers   She is telling me that she has established follow-up with pulmonary service already.  I encouraged her to keep her appointment    Essential hypertension  Dyslipidemia  Continue amlodipine and losartan  Continue statins     Ongoing tobacco use  Nicotine patch  Counseling provided     PNES  Migraine headache  Conversion disorder  Continue Topamax continue mirtazapine         Discharge Follow Up Recommendations for outpatient labs/diagnostics:  PCP 1 week   The patient was encouraged to keep her appointment with her pulmonologist      Day of Discharge     HPI:   Patient seen and examined this morning.  Comfortable in bed.  Feeling much better today.  Oxygen saturation 96 to 97% on 2 L nasal cannula.  Wheezing much improved.  She is wanting to go home.  Despite counseling and advising her to stay another day given how sick she was on admission, she declined and she insisted to go home      Vital Signs:   Temp:  [96.2 °F (35.7 °C)-97 °F (36.1 °C)] 96.2 °F (35.7 °C)  Heart Rate:  [56-81] 60  Resp:  [16-24] 18  BP: (138-179)/() 158/94  Flow (L/min):  [2-5] 2      Physical Exam:  General: Chronically ill looking, not in distress, conversant and cooperative  Head: Atraumatic and normocephalic  Eyes: No Icterus. No pallor  Ears:  Ears appear intact with no abnormalities noted  Throat: No oral lesions, no thrush  Neck: Supple, trachea midline  Lungs: Diminished air entry both lung fields.  No crackles.  Diffuse wheezing, much improved compared to yesterday  Heart:  Normal S1 and S2, no murmur, no gallop, No JVD, no lower extremity swelling  Abdomen:  Soft, no  tenderness, no organomegaly, normal bowel sounds, no organomegaly  Extremities: pulses equal bilaterally  Skin: No bleeding, bruising or rash, normal skin turgor and elasticity  Neurologic: Cranial nerves appear intact with no evidence of facial asymmetry, normal motor and sensory functions in all 4 extremities  Psych: Alert and oriented x 3, normal mood    Pertinent  and/or Most Recent Results     LAB RESULTS:      Lab 08/03/24  0545 08/02/24  0549   WBC 9.58 14.42*   HEMOGLOBIN 13.6 15.1   HEMATOCRIT 41.2 45.6   PLATELETS 174 213   NEUTROS ABS 7.89* 9.13*   IMMATURE GRANS (ABS) 0.05 0.05   LYMPHS ABS 1.34 3.99*   MONOS ABS 0.29 0.82   EOS ABS 0.00 0.32   MCV 89.0 90.7   PROCALCITONIN  --  0.02   D DIMER QUANT  --  0.41         Lab 08/03/24  0545 08/02/24  0549   SODIUM 142 141   POTASSIUM 4.4 4.2   CHLORIDE 106 103   CO2 23.0 29.0   ANION GAP 13.0 9.0   BUN 13 7   CREATININE 0.90 1.08*   EGFR 76.6 61.5   GLUCOSE 156* 144*   CALCIUM 8.9 9.3   MAGNESIUM 2.1  --    PHOSPHORUS 3.3  --          Lab 08/03/24  0545 08/02/24  0549   TOTAL PROTEIN 6.0 6.8   ALBUMIN 3.9 4.0   GLOBULIN 2.1 2.8   ALT (SGPT) 12 18   AST (SGOT) 8 19   BILIRUBIN 0.2 0.3   ALK PHOS 113 133*   LIPASE  --  25         Lab 08/02/24  0549   PROBNP 65.0   HSTROP T 7                 Brief Urine Lab Results  (Last result in the past 365 days)        Color   Clarity   Blood   Leuk Est   Nitrite   Protein   CREAT   Urine HCG        04/06/24 0609 Yellow   Clear   Negative   Negative   Negative   Negative                 Microbiology Results (last 10 days)       Procedure Component Value - Date/Time    Respiratory Panel PCR w/COVID-19(SARS-CoV-2) [facility]IMMIE/PAD/COR/[facility]AS In-House, NP Swab in UTM/VTM, 2 HR TAT - Swab, Nasopharynx [[id number]]  (Normal) Collected: 08/02/24 1132    Lab Status: Final result Specimen: Swab from Nasopharynx Updated: 08/02/24 1252     ADENOVIRUS, PCR Not Detected     Coronavirus 229E Not Detected     Coronavirus HKU1 Not Detected      Coronavirus NL63 Not Detected     Coronavirus OC43 Not Detected     COVID19 Not Detected     Human Metapneumovirus Not Detected     Human Rhinovirus/Enterovirus Not Detected     Influenza A PCR Not Detected     Influenza B PCR Not Detected     Parainfluenza Virus 1 Not Detected     Parainfluenza Virus 2 Not Detected     Parainfluenza Virus 3 Not Detected     Parainfluenza Virus 4 Not Detected     RSV, PCR Not Detected     Bordetella pertussis pcr Not Detected     Bordetella parapertussis PCR Not Detected     Chlamydophila pneumoniae PCR Not Detected     Mycoplasma pneumo by PCR Not Detected    Narrative:      In the setting of a positive respiratory panel with a viral infection PLUS a negative procalcitonin without other underlying concern for bacterial infection, consider observing off antibiotics or discontinuation of antibiotics and continue supportive care. If the respiratory panel is positive for atypical bacterial infection (Bordetella pertussis, Chlamydophila pneumoniae, or Mycoplasma pneumoniae), consider antibiotic de-escalation to target atypical bacterial infection.    COVID PRE-OP / PRE-PROCEDURE SCREENING ORDER (NO ISOLATION) - Swab, Nasopharynx [[id number]]  (Normal) Collected: 08/02/24 0554    Lab Status: Final result Specimen: Swab from Nasopharynx Updated: 08/02/24 0625    Narrative:      The following orders were created for panel order COVID PRE-OP / PRE-PROCEDURE SCREENING ORDER (NO ISOLATION) - Swab, Nasopharynx.  Procedure                               Abnormality         Status                     ---------                               -----------         ------                     COVID-19 and FLU A/B PCR...[[id number]]  Normal              Final result                 Please view results for these tests on the individual orders.    COVID-19 and FLU A/B PCR, 1 HR TAT - Swab, Nasopharynx [[id number]]  (Normal) Collected: 08/02/24 0519    Lab Status: Final result Specimen: Swab from Nasopharynx  Updated: 08/02/24 0625     COVID19 Not Detected     Influenza A PCR Not Detected     Influenza B PCR Not Detected    Narrative:      Fact sheet for providers: https://www.fda.gov/media/354157/download    Fact sheet for patients: https://www.fda.gov/media/601371/download    Test performed by PCR.            XR Chest 1 View    Result Date: 8/2/2024  XR CHEST 1 VW Date of Exam: 8/2/2024 6:06 AM EDT Indication: SOA triage protocol Comparison: 8/1/2024. Findings: The lungs are hyperexpanded. There is no pneumothorax, pleural effusion or focal airspace consolidation. Heart size and pulmonary vasculature appear within normal limits. Regional bones appear intact.     Impression: Findings suggestive of COPD without acute cardiopulmonary abnormality. Electronically Signed: Ned Forrester MD  8/2/2024 6:13 AM EDT  Workstation ID: [id number]    XR Chest PA & Lateral    Result Date: 8/1/2024  XR CHEST PA AND LATERAL Date of Exam: 8/1/2024 11:54 AM EDT Indication: chronic cough Comparison: Chest radiograph 4/4/2024. Findings: Cardiomediastinal silhouette is within normal limits. No focal consolidation or overt pulmonary edema. No pleural effusion or pneumothorax. Osseous structures are unremarkable.     Impression: No evidence of acute cardiopulmonary disease. Electronically Signed: rFeddie Sandoval MD  8/1/2024 3:36 PM EDT  Workstation ID: [id number]     Results for orders placed during the hospital encounter of 12/10/21    Doppler Arterial Multi Level Lower Extremity - Bilateral CAR    Interpretation Summary  · Right Conclusion: The right SUBHASH is normal.  · Left Conclusion: The left SUBHASH is normal.      Results for orders placed during the hospital encounter of 12/10/21    Doppler Arterial Multi Level Lower Extremity - Bilateral CAR    Interpretation Summary  · Right Conclusion: The right SUBHASH is normal.  · Left Conclusion: The left SUBHASH is normal.      Results for orders placed during the hospital encounter of 04/02/24    Adult  Transthoracic Echo Complete W/ Cont if Necessary Per Protocol    Interpretation Summary    Left ventricular systolic function is normal. Calculated left ventricular EF = 62.8%    Insufficient TR velocity profile to estimate the right ventricular systolic pressure.    Normal left atrial size and volume noted.      Plan for Follow-up of Pending Labs/Results:     Discharge Details        Discharge Medications        New Medications        Instructions Start Date   cefdinir 300 MG capsule  Commonly known as: OMNICEF   300 mg, Oral, 2 Times Daily      predniSONE 20 MG tablet  Commonly known as: DELTASONE   40 mg, Oral, Daily             Changes to Medications        Instructions Start Date   doxycycline 100 MG capsule  Commonly known as: VIBRAMYCIN  What changed: Another medication with the same name was added. Make sure you understand how and when to take each.   100 mg, Oral, 2 Times Daily      doxycycline 100 MG capsule  Commonly known as: VIBRAMYCIN  What changed: You were already taking a medication with the same name, and this prescription was added. Make sure you understand how and when to take each.   100 mg, Oral, 2 Times Daily             Continue These Medications        Instructions Start Date   albuterol sulfate  (90 Base) MCG/ACT inhaler  Commonly known as: PROVENTIL HFA;VENTOLIN HFA;PROAIR HFA   2 puffs, Inhalation, Every 4 Hours PRN      amLODIPine 10 MG tablet  Commonly known as: NORVASC   10 mg, Oral, Daily      aspirin 81 MG EC tablet   81 mg, Oral, Daily      atorvastatin 40 MG tablet  Commonly known as: LIPITOR   40 mg, Oral, Daily      Budeson-Glycopyrrol-Formoterol 160-9-4.8 MCG/ACT aerosol inhaler  Commonly known as: BREZTRI   2 puffs, Inhalation, 2 Times Daily      carvedilol 12.5 MG tablet  Commonly known as: COREG   12.5 mg, Oral, 2 Times Daily With Meals      FLUoxetine 20 MG capsule  Commonly known as: PROzac   20 mg, Oral, Daily      lactulose 10 GM/15ML solution  Commonly known as:  CHRONULAC   Take 45 mL by mouth 2 (Two) Times a Day.      levETIRAcetam 100 MG/ML solution  Commonly known as: Keppra   500 mg, Oral, 2 Times Daily      linaclotide 145 MCG capsule capsule  Commonly known as: Linzess   145 mcg, Oral, Every Morning Before Breakfast      losartan 50 MG tablet  Commonly known as: Cozaar   50 mg, Oral, Daily      mirtazapine 7.5 MG tablet  Commonly known as: REMERON   15 mg, Oral, Nightly      nystatin 100,000 unit/mL suspension  Commonly known as: MYCOSTATIN   500,000 Units, Swish & Swallow, 4 Times Daily      pantoprazole 40 MG EC tablet  Commonly known as: PROTONIX   40 mg, Oral, Daily      rizatriptan 10 MG tablet  Commonly known as: Maxalt   Take 1 tablet at onset of headache.  May repeat once in 2 hours.  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    Propofol Other (See Comments)     Propofol infusion reaction, weakness, tremors, paralysis         Discharge Disposition:  Home or Self Care    Diet:  Hospital:  Diet Order   Procedures    Diet: Regular/House; Fluid Consistency: Thin (IDDSI 0)            Activity:  As tolerated    Restrictions or Other Recommendations:  None        CODE STATUS:    Code Status and Medical Interventions: CPR (Attempt to Resuscitate); Full Support   Ordered at: 08/02/24 0821     Level Of Support Discussed With:    Patient     Code Status (Patient has no pulse and is not breathing):    CPR (Attempt to Resuscitate)     Medical Interventions (Patient has pulse or is breathing):    Full Support       Future Appointments   Date Time Provider Department Center   10/16/2024 10:00 AM Coby Ely PA-C [facility]   10/21/2024  9:00 AM Mary Lr APRN [facility]                 Ruth Rahman MD  08/03/24      Time Spent on Discharge:  I spent  28  minutes on this discharge activity which  included: face-to-face encounter with the patient, reviewing the data in the system, coordination of the care with the nursing staff as well as consultants, documentation, and entering orders.

## 2024-08-03 NOTE — PLAN OF CARE
Problem: Adult Inpatient Plan of Care  Goal: Plan of Care Review  8/3/2024 1055 by Ned Lovell RN  Outcome: Met  8/3/2024 0819 by Ned Lovell RN  Outcome: Ongoing, Progressing  Flowsheets (Taken 8/3/2024 0819)  Progress: improving  Plan of Care Reviewed With: patient  Goal: Patient-Specific Goal (Individualized)  8/3/2024 1055 by eNd Lovell RN  Outcome: Met  8/3/2024 0819 by Ned Lovell RN  Outcome: Ongoing, Progressing  Goal: Absence of Hospital-Acquired Illness or Injury  8/3/2024 1055 by Ned Lovell RN  Outcome: Met  8/3/2024 0819 by Ned Lovell RN  Outcome: Ongoing, Progressing  Intervention: Identify and Manage Fall Risk  Recent Flowsheet Documentation  Taken 8/3/2024 0816 by Ned Lovell RN  Safety Promotion/Fall Prevention:   activity supervised   nonskid shoes/slippers when out of bed   room organization consistent   safety round/check completed  Intervention: Prevent Skin Injury  Recent Flowsheet Documentation  Taken 8/3/2024 0816 by Ned Lovell RN  Body Position:   position changed independently   weight shifting  Skin Protection:   adhesive use limited   tubing/devices free from skin contact  Intervention: Prevent and Manage VTE (Venous Thromboembolism) Risk  Recent Flowsheet Documentation  Taken 8/3/2024 0816 by Ned Lovell RN  Activity Management: activity encouraged  Intervention: Prevent Infection  Recent Flowsheet Documentation  Taken 8/3/2024 0816 by Ned Lovell RN  Infection Prevention:   environmental surveillance performed   equipment surfaces disinfected   hand hygiene promoted   personal protective equipment utilized   rest/sleep promoted   single patient room provided  Goal: Optimal Comfort and Wellbeing  8/3/2024 1055 by Ned Lovell RN  Outcome: Met  8/3/2024 0819 by Ned Lovell RN  Outcome: Ongoing, Progressing  Intervention: Provide Person-Centered Care  Recent Flowsheet Documentation  Taken 8/3/2024 0816 by Ned Lovell RN  Trust  Relationship/Rapport:   care explained   choices provided   emotional support provided   empathic listening provided   questions answered   questions encouraged   reassurance provided   thoughts/feelings acknowledged  Goal: Readiness for Transition of Care  8/3/2024 1055 by Ned Lovell RN  Outcome: Met  8/3/2024 0819 by Ned Lovell RN  Outcome: Ongoing, Progressing     Problem: Asthma Comorbidity  Goal: Maintenance of Asthma Control  8/3/2024 1055 by Ned Lovell RN  Outcome: Met  8/3/2024 0819 by Ned Lovell RN  Outcome: Ongoing, Progressing  Intervention: Maintain Asthma Symptom Control  Recent Flowsheet Documentation  Taken 8/3/2024 0816 by Ned Lovell RN  Medication Review/Management: medications reviewed     Problem: Behavioral Health Comorbidity  Goal: Maintenance of Behavioral Health Symptom Control  8/3/2024 1055 by Ned Lovell RN  Outcome: Met  8/3/2024 0819 by Ned Lovell RN  Outcome: Ongoing, Progressing  Intervention: Maintain Behavioral Health Symptom Control  Recent Flowsheet Documentation  Taken 8/3/2024 0816 by Ned Lovell RN  Medication Review/Management: medications reviewed     Problem: COPD (Chronic Obstructive Pulmonary Disease) Comorbidity  Goal: Maintenance of COPD Symptom Control  8/3/2024 1055 by Ned Lovell RN  Outcome: Met  8/3/2024 0819 by Ned Lovell RN  Outcome: Ongoing, Progressing  Intervention: Maintain COPD-Symptom Control  Recent Flowsheet Documentation  Taken 8/3/2024 0816 by Ned Lovell RN  Supportive Measures:   active listening utilized   self-care encouraged   self-reflection promoted   self-responsibility promoted  Medication Review/Management: medications reviewed     Problem: Diabetes Comorbidity  Goal: Blood Glucose Level Within Targeted Range  8/3/2024 1055 by Ned Lovell RN  Outcome: Met  8/3/2024 0819 by Ned Lovell RN  Outcome: Ongoing, Progressing  Intervention: Monitor and Manage Glycemia  Recent Flowsheet  Documentation  Taken 8/3/2024 0816 by Ned Lovell RN  Glycemic Management: oral hydration promoted     Problem: Heart Failure Comorbidity  Goal: Maintenance of Heart Failure Symptom Control  8/3/2024 1055 by Ned Lovell RN  Outcome: Met  8/3/2024 0819 by Ned Lovell RN  Outcome: Ongoing, Progressing  Intervention: Maintain Heart Failure-Management  Recent Flowsheet Documentation  Taken 8/3/2024 0816 by Ned Lovell RN  Medication Review/Management: medications reviewed     Problem: Hypertension Comorbidity  Goal: Blood Pressure in Desired Range  8/3/2024 1055 by Ned Lovell RN  Outcome: Met  8/3/2024 0819 by Ned Lovell RN  Outcome: Ongoing, Progressing  Intervention: Maintain Blood Pressure Management  Recent Flowsheet Documentation  Taken 8/3/2024 0816 by Ned Lovell RN  Syncope Management:   legs elevated   position changed slowly  Medication Review/Management: medications reviewed     Problem: Obstructive Sleep Apnea Risk or Actual Comorbidity Management  Goal: Unobstructed Breathing During Sleep  8/3/2024 1055 by Ned Lovell RN  Outcome: Met  8/3/2024 0819 by Ned Lovell RN  Outcome: Ongoing, Progressing     Problem: Osteoarthritis Comorbidity  Goal: Maintenance of Osteoarthritis Symptom Control  8/3/2024 1055 by Ned Lovell RN  Outcome: Met  8/3/2024 0819 by Ned Lovell RN  Outcome: Ongoing, Progressing  Intervention: Maintain Osteoarthritis Symptom Control  Recent Flowsheet Documentation  Taken 8/3/2024 0816 by Ned Lovell RN  Activity Management: activity encouraged  Medication Review/Management: medications reviewed     Problem: Pain Chronic (Persistent) (Comorbidity Management)  Goal: Acceptable Pain Control and Functional Ability  8/3/2024 1055 by Ned Lovell RN  Outcome: Met  8/3/2024 0819 by Ned Lovell RN  Outcome: Ongoing, Progressing  Intervention: Manage Persistent Pain  Recent Flowsheet Documentation  Taken 8/3/2024 0816 by Ned Lovell  RN  Sleep/Rest Enhancement:   awakenings minimized   consistent schedule promoted   family presence promoted   natural light exposure provided   regular sleep/rest pattern promoted   therapeutic touch utilized  Medication Review/Management: medications reviewed  Intervention: Optimize Psychosocial Wellbeing  Recent Flowsheet Documentation  Taken 8/3/2024 0816 by Ned Lovell RN  Supportive Measures:   active listening utilized   self-care encouraged   self-reflection promoted   self-responsibility promoted  Diversional Activities:   smartphone   television  Spiritual Activities Assistance:   affirmation provided   personal rituals encouraged  Family/Support System Care:   involvement promoted   support provided   self-care encouraged     Problem: Seizure Disorder Comorbidity  Goal: Maintenance of Seizure Control  8/3/2024 1055 by Ned Lovell RN  Outcome: Met  8/3/2024 0819 by Ned Lovell RN  Outcome: Ongoing, Progressing     Problem: Adjustment to Illness (Sepsis/Septic Shock)  Goal: Optimal Coping  8/3/2024 1055 by Ned Lovell RN  Outcome: Met  8/3/2024 0819 by Ned Lovell RN  Outcome: Ongoing, Progressing  Intervention: Optimize Psychosocial Adjustment to Illness  Recent Flowsheet Documentation  Taken 8/3/2024 0816 by Ned Lovell RN  Supportive Measures:   active listening utilized   self-care encouraged   self-reflection promoted   self-responsibility promoted  Family/Support System Care:   involvement promoted   support provided   self-care encouraged     Problem: Bleeding (Sepsis/Septic Shock)  Goal: Absence of Bleeding  8/3/2024 1055 by Ned Lovell RN  Outcome: Met  8/3/2024 0819 by Ned Lovell RN  Outcome: Ongoing, Progressing     Problem: Glycemic Control Impaired (Sepsis/Septic Shock)  Goal: Blood Glucose Level Within Desired Range  8/3/2024 1055 by Ned Lovell RN  Outcome: Met  8/3/2024 0819 by Ned Lovell RN  Outcome: Ongoing, Progressing  Intervention: Optimize  Glycemic Control  Recent Flowsheet Documentation  Taken 8/3/2024 0816 by Ned Lovell RN  Glycemic Management: oral hydration promoted     Problem: Infection Progression (Sepsis/Septic Shock)  Goal: Absence of Infection Signs and Symptoms  8/3/2024 1055 by Ned Lovell RN  Outcome: Met  8/3/2024 0819 by Ned Lovell RN  Outcome: Ongoing, Progressing  Intervention: Initiate Sepsis Management  Recent Flowsheet Documentation  Taken 8/3/2024 0816 by Ned Lovell RN  Infection Prevention:   environmental surveillance performed   equipment surfaces disinfected   hand hygiene promoted   personal protective equipment utilized   rest/sleep promoted   single patient room provided  Intervention: Promote Recovery  Recent Flowsheet Documentation  Taken 8/3/2024 0816 by Ned Lovell RN  Activity Management: activity encouraged  Airway/Ventilation Support:   comfort measures provided   cough relief provided   dyspnea relief promoted   pulmonary hygiene promoted  Sleep/Rest Enhancement:   awakenings minimized   consistent schedule promoted   family presence promoted   natural light exposure provided   regular sleep/rest pattern promoted   therapeutic touch utilized  Intervention: Promote Stabilization  Recent Flowsheet Documentation  Taken 8/3/2024 0816 by Ned Lovell RN  Lung Protection Measures: fluid excess minimized  Fluid/Electrolyte Management: fluids provided     Problem: Nutrition Impaired (Sepsis/Septic Shock)  Goal: Optimal Nutrition Intake  8/3/2024 1055 by Ned Lovell RN  Outcome: Met  8/3/2024 0819 by Ned Lovell RN  Outcome: Ongoing, Progressing     Problem: Pain Acute  Goal: Acceptable Pain Control and Functional Ability  8/3/2024 1055 by Ned Lovell RN  Outcome: Met  8/3/2024 0819 by Ned Lovell RN  Outcome: Ongoing, Progressing  Intervention: Prevent or Manage Pain  Recent Flowsheet Documentation  Taken 8/3/2024 0816 by Ned Lovell RN  Sensory Stimulation Regulation:    auditory stimulation minimized   care clustered   quiet environment promoted   tactile stimulation minimized   visual stimulation minimized  Sleep/Rest Enhancement:   awakenings minimized   consistent schedule promoted   family presence promoted   natural light exposure provided   regular sleep/rest pattern promoted   therapeutic touch utilized  Medication Review/Management: medications reviewed  Intervention: Optimize Psychosocial Wellbeing  Recent Flowsheet Documentation  Taken 8/3/2024 0816 by Ned Lovell RN  Supportive Measures:   active listening utilized   self-care encouraged   self-reflection promoted   self-responsibility promoted  Diversional Activities:   smartphone   television  Spiritual Activities Assistance:   affirmation provided   personal rituals encouraged     Problem: Fall Injury Risk  Goal: Absence of Fall and Fall-Related Injury  8/3/2024 1055 by Ned Lovell RN  Outcome: Met  8/3/2024 0819 by Ned Lovell RN  Outcome: Ongoing, Progressing  Intervention: Identify and Manage Contributors  Recent Flowsheet Documentation  Taken 8/3/2024 0816 by Ned Lovell RN  Medication Review/Management: medications reviewed  Self-Care Promotion:   BADL personal objects within reach   BADL personal routines maintained   safe use of adaptive equipment encouraged  Intervention: Promote Injury-Free Environment  Recent Flowsheet Documentation  Taken 8/3/2024 0816 by Ned Lovell RN  Safety Promotion/Fall Prevention:   activity supervised   nonskid shoes/slippers when out of bed   room organization consistent   safety round/check completed     Problem: Infection  Goal: Absence of Infection Signs and Symptoms  8/3/2024 1055 by Ned Lovell RN  Outcome: Met  8/3/2024 0819 by Ned Lovell RN  Outcome: Ongoing, Progressing     Problem: Gas Exchange Impaired  Goal: Optimal Gas Exchange  8/3/2024 1055 by Ned Lovell RN  Outcome: Met  8/3/2024 0819 by Ned Lovell RN  Outcome: Ongoing,  Progressing  Intervention: Optimize Oxygenation and Ventilation  Recent Flowsheet Documentation  Taken 8/3/2024 0816 by Ned Lovell, RN  Head of Bed (HOB) Positioning: HOB elevated     Problem: Breathing Pattern Ineffective  Goal: Effective Breathing Pattern  8/3/2024 1055 by Ned Lovell, RN  Outcome: Met  8/3/2024 0819 by Ned Lovell, RN  Outcome: Ongoing, Progressing  Intervention: Promote Improved Breathing Pattern  Recent Flowsheet Documentation  Taken 8/3/2024 0816 by Ned Lovell RN  Supportive Measures:   active listening utilized   self-care encouraged   self-reflection promoted   self-responsibility promoted  Head of Bed (HOB) Positioning: HOB elevated   Goal Outcome Evaluation:  Plan of Care Reviewed With: patient        Progress: improving

## 2024-08-03 NOTE — PLAN OF CARE
Problem: Adult Inpatient Plan of Care  Goal: Plan of Care Review  Outcome: Ongoing, Progressing  Flowsheets (Taken 8/3/2024 0819)  Progress: improving  Plan of Care Reviewed With: patient  Goal: Patient-Specific Goal (Individualized)  Outcome: Ongoing, Progressing  Goal: Absence of Hospital-Acquired Illness or Injury  Outcome: Ongoing, Progressing  Intervention: Identify and Manage Fall Risk  Recent Flowsheet Documentation  Taken 8/3/2024 0816 by Ned Lovell RN  Safety Promotion/Fall Prevention:   activity supervised   nonskid shoes/slippers when out of bed   room organization consistent   safety round/check completed  Intervention: Prevent Skin Injury  Recent Flowsheet Documentation  Taken 8/3/2024 0816 by Ned Lovell RN  Body Position:   position changed independently   weight shifting  Skin Protection:   adhesive use limited   tubing/devices free from skin contact  Intervention: Prevent and Manage VTE (Venous Thromboembolism) Risk  Recent Flowsheet Documentation  Taken 8/3/2024 0816 by Ned Lovell RN  Activity Management: activity encouraged  Intervention: Prevent Infection  Recent Flowsheet Documentation  Taken 8/3/2024 0816 by Ned Lovell RN  Infection Prevention:   environmental surveillance performed   equipment surfaces disinfected   hand hygiene promoted   personal protective equipment utilized   rest/sleep promoted   single patient room provided  Goal: Optimal Comfort and Wellbeing  Outcome: Ongoing, Progressing  Intervention: Provide Person-Centered Care  Recent Flowsheet Documentation  Taken 8/3/2024 0816 by Ned Lovell RN  Trust Relationship/Rapport:   care explained   choices provided   emotional support provided   empathic listening provided   questions answered   questions encouraged   reassurance provided   thoughts/feelings acknowledged  Goal: Readiness for Transition of Care  Outcome: Ongoing, Progressing     Problem: Asthma Comorbidity  Goal: Maintenance of Asthma  Control  Outcome: Ongoing, Progressing  Intervention: Maintain Asthma Symptom Control  Recent Flowsheet Documentation  Taken 8/3/2024 0816 by Ned Lovell RN  Medication Review/Management: medications reviewed     Problem: Behavioral Health Comorbidity  Goal: Maintenance of Behavioral Health Symptom Control  Outcome: Ongoing, Progressing  Intervention: Maintain Behavioral Health Symptom Control  Recent Flowsheet Documentation  Taken 8/3/2024 0816 by Ned Lovell RN  Medication Review/Management: medications reviewed     Problem: COPD (Chronic Obstructive Pulmonary Disease) Comorbidity  Goal: Maintenance of COPD Symptom Control  Outcome: Ongoing, Progressing  Intervention: Maintain COPD-Symptom Control  Recent Flowsheet Documentation  Taken 8/3/2024 0816 by Ned Lovell RN  Supportive Measures:   active listening utilized   self-care encouraged   self-reflection promoted   self-responsibility promoted  Medication Review/Management: medications reviewed     Problem: Diabetes Comorbidity  Goal: Blood Glucose Level Within Targeted Range  Outcome: Ongoing, Progressing  Intervention: Monitor and Manage Glycemia  Recent Flowsheet Documentation  Taken 8/3/2024 0816 by Ned Lovell RN  Glycemic Management: oral hydration promoted     Problem: Heart Failure Comorbidity  Goal: Maintenance of Heart Failure Symptom Control  Outcome: Ongoing, Progressing  Intervention: Maintain Heart Failure-Management  Recent Flowsheet Documentation  Taken 8/3/2024 0816 by Ned Lovell RN  Medication Review/Management: medications reviewed     Problem: Hypertension Comorbidity  Goal: Blood Pressure in Desired Range  Outcome: Ongoing, Progressing  Intervention: Maintain Blood Pressure Management  Recent Flowsheet Documentation  Taken 8/3/2024 0816 by Ned Lovell RN  Syncope Management:   legs elevated   position changed slowly  Medication Review/Management: medications reviewed     Problem: Obstructive Sleep Apnea Risk or  Actual Comorbidity Management  Goal: Unobstructed Breathing During Sleep  Outcome: Ongoing, Progressing     Problem: Osteoarthritis Comorbidity  Goal: Maintenance of Osteoarthritis Symptom Control  Outcome: Ongoing, Progressing  Intervention: Maintain Osteoarthritis Symptom Control  Recent Flowsheet Documentation  Taken 8/3/2024 0816 by Ned Lovell RN  Activity Management: activity encouraged  Medication Review/Management: medications reviewed     Problem: Pain Chronic (Persistent) (Comorbidity Management)  Goal: Acceptable Pain Control and Functional Ability  Outcome: Ongoing, Progressing  Intervention: Manage Persistent Pain  Recent Flowsheet Documentation  Taken 8/3/2024 0816 by Ned Lovell RN  Sleep/Rest Enhancement:   awakenings minimized   consistent schedule promoted   family presence promoted   natural light exposure provided   regular sleep/rest pattern promoted   therapeutic touch utilized  Medication Review/Management: medications reviewed  Intervention: Optimize Psychosocial Wellbeing  Recent Flowsheet Documentation  Taken 8/3/2024 0816 by Ned Lovell RN  Supportive Measures:   active listening utilized   self-care encouraged   self-reflection promoted   self-responsibility promoted  Diversional Activities:   smartphone   television  Spiritual Activities Assistance:   affirmation provided   personal rituals encouraged  Family/Support System Care:   involvement promoted   support provided   self-care encouraged     Problem: Seizure Disorder Comorbidity  Goal: Maintenance of Seizure Control  Outcome: Ongoing, Progressing     Problem: Adjustment to Illness (Sepsis/Septic Shock)  Goal: Optimal Coping  Outcome: Ongoing, Progressing  Intervention: Optimize Psychosocial Adjustment to Illness  Recent Flowsheet Documentation  Taken 8/3/2024 0816 by Ned Lovell RN  Supportive Measures:   active listening utilized   self-care encouraged   self-reflection promoted   self-responsibility  promoted  Family/Support System Care:   involvement promoted   support provided   self-care encouraged     Problem: Bleeding (Sepsis/Septic Shock)  Goal: Absence of Bleeding  Outcome: Ongoing, Progressing     Problem: Glycemic Control Impaired (Sepsis/Septic Shock)  Goal: Blood Glucose Level Within Desired Range  Outcome: Ongoing, Progressing  Intervention: Optimize Glycemic Control  Recent Flowsheet Documentation  Taken 8/3/2024 0816 by Ned Lovell RN  Glycemic Management: oral hydration promoted     Problem: Infection Progression (Sepsis/Septic Shock)  Goal: Absence of Infection Signs and Symptoms  Outcome: Ongoing, Progressing  Intervention: Initiate Sepsis Management  Recent Flowsheet Documentation  Taken 8/3/2024 0816 by Ned Lovell RN  Infection Prevention:   environmental surveillance performed   equipment surfaces disinfected   hand hygiene promoted   personal protective equipment utilized   rest/sleep promoted   single patient room provided  Intervention: Promote Recovery  Recent Flowsheet Documentation  Taken 8/3/2024 0816 by Ned Lovell RN  Activity Management: activity encouraged  Airway/Ventilation Support:   comfort measures provided   cough relief provided   dyspnea relief promoted   pulmonary hygiene promoted  Sleep/Rest Enhancement:   awakenings minimized   consistent schedule promoted   family presence promoted   natural light exposure provided   regular sleep/rest pattern promoted   therapeutic touch utilized  Intervention: Promote Stabilization  Recent Flowsheet Documentation  Taken 8/3/2024 0816 by Ned Lovell RN  Lung Protection Measures: fluid excess minimized  Fluid/Electrolyte Management: fluids provided     Problem: Nutrition Impaired (Sepsis/Septic Shock)  Goal: Optimal Nutrition Intake  Outcome: Ongoing, Progressing     Problem: Pain Acute  Goal: Acceptable Pain Control and Functional Ability  Outcome: Ongoing, Progressing  Intervention: Prevent or Manage Pain  Recent  Flowsheet Documentation  Taken 8/3/2024 0816 by Ned Lovell RN  Sensory Stimulation Regulation:   auditory stimulation minimized   care clustered   quiet environment promoted   tactile stimulation minimized   visual stimulation minimized  Sleep/Rest Enhancement:   awakenings minimized   consistent schedule promoted   family presence promoted   natural light exposure provided   regular sleep/rest pattern promoted   therapeutic touch utilized  Medication Review/Management: medications reviewed  Intervention: Optimize Psychosocial Wellbeing  Recent Flowsheet Documentation  Taken 8/3/2024 0816 by Ned Lovell RN  Supportive Measures:   active listening utilized   self-care encouraged   self-reflection promoted   self-responsibility promoted  Diversional Activities:   smartphone   television  Spiritual Activities Assistance:   affirmation provided   personal rituals encouraged     Problem: Fall Injury Risk  Goal: Absence of Fall and Fall-Related Injury  Outcome: Ongoing, Progressing  Intervention: Identify and Manage Contributors  Recent Flowsheet Documentation  Taken 8/3/2024 0816 by Ned Lovell RN  Medication Review/Management: medications reviewed  Self-Care Promotion:   BADL personal objects within reach   BADL personal routines maintained   safe use of adaptive equipment encouraged  Intervention: Promote Injury-Free Environment  Recent Flowsheet Documentation  Taken 8/3/2024 0816 by Ned Lovell RN  Safety Promotion/Fall Prevention:   activity supervised   nonskid shoes/slippers when out of bed   room organization consistent   safety round/check completed     Problem: Infection  Goal: Absence of Infection Signs and Symptoms  Outcome: Ongoing, Progressing     Problem: Gas Exchange Impaired  Goal: Optimal Gas Exchange  Outcome: Ongoing, Progressing  Intervention: Optimize Oxygenation and Ventilation  Recent Flowsheet Documentation  Taken 8/3/2024 0816 by Ned Lovell RN  Head of Bed (HOB) Positioning:  HOB elevated     Problem: Breathing Pattern Ineffective  Goal: Effective Breathing Pattern  Outcome: Ongoing, Progressing  Intervention: Promote Improved Breathing Pattern  Recent Flowsheet Documentation  Taken 8/3/2024 0816 by Ned Lovell, RN  Supportive Measures:   active listening utilized   self-care encouraged   self-reflection promoted   self-responsibility promoted  Head of Bed (HOB) Positioning: HOB elevated   Goal Outcome Evaluation:  Plan of Care Reviewed With: patient        Progress: improving

## 2024-08-03 NOTE — PLAN OF CARE
Goal Outcome Evaluation:     Problem: Adult Inpatient Plan of Care  Goal: Plan of Care Review  Outcome: Ongoing, Progressing  Goal: Patient-Specific Goal (Individualized)  Outcome: Ongoing, Progressing  Goal: Absence of Hospital-Acquired Illness or Injury  Outcome: Ongoing, Progressing  Goal: Optimal Comfort and Wellbeing  Outcome: Ongoing, Progressing  Goal: Readiness for Transition of Care  Outcome: Ongoing, Progressing     Problem: Asthma Comorbidity  Goal: Maintenance of Asthma Control  Outcome: Ongoing, Progressing     Problem: Behavioral Health Comorbidity  Goal: Maintenance of Behavioral Health Symptom Control  Outcome: Ongoing, Progressing     Problem: COPD (Chronic Obstructive Pulmonary Disease) Comorbidity  Goal: Maintenance of COPD Symptom Control  Outcome: Ongoing, Progressing     Problem: Diabetes Comorbidity  Goal: Blood Glucose Level Within Targeted Range  Outcome: Ongoing, Progressing     Problem: Heart Failure Comorbidity  Goal: Maintenance of Heart Failure Symptom Control  Outcome: Ongoing, Progressing     Problem: Hypertension Comorbidity  Goal: Blood Pressure in Desired Range  Outcome: Ongoing, Progressing     Problem: Obstructive Sleep Apnea Risk or Actual Comorbidity Management  Goal: Unobstructed Breathing During Sleep  Outcome: Ongoing, Progressing     Problem: Osteoarthritis Comorbidity  Goal: Maintenance of Osteoarthritis Symptom Control  Outcome: Ongoing, Progressing     Problem: Pain Chronic (Persistent) (Comorbidity Management)  Goal: Acceptable Pain Control and Functional Ability  Outcome: Ongoing, Progressing     Problem: Seizure Disorder Comorbidity  Goal: Maintenance of Seizure Control  Outcome: Ongoing, Progressing     Problem: Adjustment to Illness (Sepsis/Septic Shock)  Goal: Optimal Coping  Outcome: Ongoing, Progressing     Problem: Bleeding (Sepsis/Septic Shock)  Goal: Absence of Bleeding  Outcome: Ongoing, Progressing     Problem: Glycemic Control Impaired (Sepsis/Septic  Shock)  Goal: Blood Glucose Level Within Desired Range  Outcome: Ongoing, Progressing     Problem: Infection Progression (Sepsis/Septic Shock)  Goal: Absence of Infection Signs and Symptoms  Outcome: Ongoing, Progressing     Problem: Nutrition Impaired (Sepsis/Septic Shock)  Goal: Optimal Nutrition Intake  Outcome: Ongoing, Progressing     Problem: Pain Acute  Goal: Acceptable Pain Control and Functional Ability  Outcome: Ongoing, Progressing     Problem: Fall Injury Risk  Goal: Absence of Fall and Fall-Related Injury  Outcome: Ongoing, Progressing     Problem: Infection  Goal: Absence of Infection Signs and Symptoms  Outcome: Ongoing, Progressing     Problem: Gas Exchange Impaired  Goal: Optimal Gas Exchange  Outcome: Ongoing, Progressing     Problem: Breathing Pattern Ineffective  Goal: Effective Breathing Pattern  Outcome: Ongoing, Progressing

## 2024-08-05 ENCOUNTER — TRANSITIONAL CARE MANAGEMENT TELEPHONE ENCOUNTER (OUTPATIENT)
Dept: CALL CENTER | Facility: HOSPITAL | Age: 54
End: 2024-08-05
Payer: MEDICARE

## 2024-08-05 NOTE — OUTREACH NOTE
Call Center TCM Note      Flowsheet Row Responses   [facility] patient discharged from? [place]   Does the patient have one of the following disease processes/diagnoses(primary or secondary)? COPD   TCM attempt successful? No   Unsuccessful attempts Attempt 2   Call Status Left message            Ceci Holcomb RN    8/5/2024, 16:33 EDT

## 2024-08-05 NOTE — OUTREACH NOTE
Call Center TCM Note      Flowsheet Row Responses   [facility] patient discharged from? [place]   Does the patient have one of the following disease processes/diagnoses(primary or secondary)? COPD   TCM attempt successful? No   Unsuccessful attempts Attempt 1   Call Status Left message            Ceci Holcomb RN    8/5/2024, 14:33 EDT

## 2024-08-06 ENCOUNTER — TRANSITIONAL CARE MANAGEMENT TELEPHONE ENCOUNTER (OUTPATIENT)
Dept: CALL CENTER | Facility: HOSPITAL | Age: 54
End: 2024-08-06
Payer: MEDICARE

## 2024-08-06 NOTE — OUTREACH NOTE
Call Center TCM Note      Flowsheet Row Responses   [facility] patient discharged from? [place]   Does the patient have one of the following disease processes/diagnoses(primary or secondary)? COPD   TCM attempt successful? No   Unsuccessful attempts Attempt 3            Mar Bruno RN    8/6/2024, 13:16 EDT

## 2024-08-21 ENCOUNTER — OFFICE VISIT (OUTPATIENT)
Age: 54
End: 2024-08-21
Payer: MEDICARE

## 2024-08-21 VITALS
DIASTOLIC BLOOD PRESSURE: 82 MMHG | SYSTOLIC BLOOD PRESSURE: 132 MMHG | RESPIRATION RATE: 16 BRPM | WEIGHT: 142.3 LBS | TEMPERATURE: 98.3 F | HEART RATE: 74 BPM | BODY MASS INDEX: 22.34 KG/M2 | HEIGHT: 67 IN

## 2024-08-21 DIAGNOSIS — J44.1 CHRONIC OBSTRUCTIVE PULMONARY DISEASE WITH ACUTE EXACERBATION: Primary | ICD-10-CM

## 2024-08-21 DIAGNOSIS — K21.9 GASTROESOPHAGEAL REFLUX DISEASE WITHOUT ESOPHAGITIS: Chronic | ICD-10-CM

## 2024-08-21 DIAGNOSIS — F17.210 CIGARETTE NICOTINE DEPENDENCE WITHOUT COMPLICATION: ICD-10-CM

## 2024-08-21 DIAGNOSIS — J30.9 ALLERGIC RHINITIS, UNSPECIFIED SEASONALITY, UNSPECIFIED TRIGGER: ICD-10-CM

## 2024-08-21 DIAGNOSIS — Z72.0 TOBACCO USE: ICD-10-CM

## 2024-08-21 PROCEDURE — 3079F DIAST BP 80-89 MM HG: CPT | Performed by: NURSE PRACTITIONER

## 2024-08-21 PROCEDURE — 94726 PLETHYSMOGRAPHY LUNG VOLUMES: CPT | Performed by: NURSE PRACTITIONER

## 2024-08-21 PROCEDURE — 3075F SYST BP GE 130 - 139MM HG: CPT | Performed by: NURSE PRACTITIONER

## 2024-08-21 PROCEDURE — 1160F RVW MEDS BY RX/DR IN RCRD: CPT | Performed by: NURSE PRACTITIONER

## 2024-08-21 PROCEDURE — 99214 OFFICE O/P EST MOD 30 MIN: CPT | Performed by: NURSE PRACTITIONER

## 2024-08-21 PROCEDURE — 94729 DIFFUSING CAPACITY: CPT | Performed by: NURSE PRACTITIONER

## 2024-08-21 PROCEDURE — 1159F MED LIST DOCD IN RCRD: CPT | Performed by: NURSE PRACTITIONER

## 2024-08-21 PROCEDURE — 94618 PULMONARY STRESS TESTING: CPT | Performed by: NURSE PRACTITIONER

## 2024-08-21 PROCEDURE — 94060 EVALUATION OF WHEEZING: CPT | Performed by: NURSE PRACTITIONER

## 2024-08-21 RX ORDER — FEXOFENADINE HCL 180 MG/1
180 TABLET ORAL DAILY
Qty: 90 TABLET | Refills: 3 | Status: SHIPPED | OUTPATIENT
Start: 2024-08-21

## 2024-08-21 RX ORDER — ALBUTEROL SULFATE 2.5 MG/3ML
2.5 SOLUTION RESPIRATORY (INHALATION) 4 TIMES DAILY PRN
Qty: 360 ML | Refills: 3 | Status: SHIPPED | OUTPATIENT
Start: 2024-08-21

## 2024-08-21 RX ORDER — BUPROPION HYDROCHLORIDE 150 MG/1
150 TABLET ORAL DAILY
Qty: 30 TABLET | Refills: 1 | Status: SHIPPED | OUTPATIENT
Start: 2024-08-21

## 2024-08-21 RX ORDER — ALBUTEROL SULFATE 90 UG/1
4 AEROSOL, METERED RESPIRATORY (INHALATION) ONCE
Status: COMPLETED | OUTPATIENT
Start: 2024-08-21 | End: 2024-08-21

## 2024-08-21 RX ADMIN — ALBUTEROL SULFATE 4 PUFF: 90 AEROSOL, METERED RESPIRATORY (INHALATION) at 13:46

## 2024-08-21 NOTE — PROGRESS NOTES
[facility] Pulmonary Follow up    CHIEF COMPLAINT    Congestion/shortness of breath/anxiety    HISTORY OF PRESENT ILLNESS    Arcelia Quintero is a 54 y.o.female here today for a hospital follow-up.  She had been followed in our office from 5836-2117 for her COPD.  She had severe COPD at that time.    She was recently admitted to [facility] on 8/2 to 8/3 for COPD exacerbation.  In April 2024 she was admitted for respiratory failure requiring intubation.  she was treated with IV steroids and antibiotics and her symptoms improved.     She finished her medications from the hospitalization but does continue to have some thick green sputum.    She denies any hemoptysis.  She denies any fever, chills.    She continues use the Breztri 2 puffs twice a day.  She is using her nebulizers every 6 hours.    She continues to use her oxygen at nighttime.  She was supposed to get set up with daytime oxygen.    She continues to smoke 1 pack/day.  She has smoked at least 21 years.    She has a chest tightness when she takes a deep breath.  She denies any lower extremity edema or palpitations.     She does have significant GERD.  She does take medication on a daily basis but does have breakthrough symptoms.  She does not follow reflux precautions.    She does not sleep well.  She does wake up frequently.  She does feel like she is more anxious to stay alone at home.    She does have allergies and needs a prescription sent for her allergy medication.    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    COPD (chronic obstructive pulmonary disease)    Cervical high risk HPV (human papillomavirus) test positive    Abnormal Pap smear of cervix    PTSD (post-traumatic stress disorder)    H/O PNES    AMS (altered mental status)    Hypokalemia    History of stroke    CKD (chronic kidney disease), stage III    Former smoker    Psychogenic nonepileptic seizure    Abnormal nuclear stress test    Other chest pain    COPD exacerbation    Acute on chronic respiratory failure with hypoxia       Allergies   Allergen Reactions    Codeine Hives    Influenza Vaccines Other (See Comments)     Got GB syndrome    Propofol Other (See Comments)     Propofol infusion reaction, weakness, tremors, paralysis       Current Outpatient Medications:     albuterol sulfate  (90 Base) MCG/ACT inhaler, Inhale 2 puffs Every 4 (Four) Hours As Needed for Wheezing., Disp: 18 g, Rfl: 0    amLODIPine (NORVASC) 10 MG tablet, Take 1 tablet by mouth Daily., Disp: 90 tablet, Rfl: 0    aspirin 81 MG EC tablet, Take 1 tablet by mouth Daily., Disp: , Rfl:     atorvastatin (LIPITOR) 40 MG tablet, Take 1 tablet by mouth Daily., Disp: 30 tablet, Rfl: 1    Budeson-Glycopyrrol-Formoterol (BREZTRI) 160-9-4.8 MCG/ACT aerosol inhaler, Inhale 2 puffs 2 (Two) Times a Day., Disp: 10.7 g, Rfl: 11    carvedilol (COREG) 12.5 MG tablet, Take 1 tablet by mouth 2 (Two) Times a Day With Meals., Disp: 180 tablet, Rfl: 0    FLUoxetine (PROzac) 20 MG capsule, Take 1 capsule by mouth Daily., Disp: 30 capsule, Rfl: 0    lactulose (CHRONULAC) 10 GM/15ML solution, Take 45 mL by mouth 2 (Two) Times a Day., Disp: , Rfl:     levETIRAcetam (Keppra) 100 MG/ML solution, Take 5 mL by mouth 2 (Two) Times a Day., Disp: 300 mL, Rfl: 0    linaclotide (Linzess) 145 MCG capsule capsule, Take 1 capsule by mouth Every Morning Before Breakfast., Disp: 90 capsule, Rfl: 3    losartan (Cozaar) 50 MG tablet, Take 1  tablet by mouth Daily., Disp: 90 tablet, Rfl: 0    mirtazapine (REMERON) 7.5 MG tablet, Take 2 tablets by mouth Every Night., Disp: 30 tablet, Rfl: 0    nystatin (MYCOSTATIN) 100,000 unit/mL suspension, Swish and swallow 5 mL 4 (Four) Times a Day., Disp: 473 mL, Rfl: 0    pantoprazole (PROTONIX) 40 MG EC tablet, TAKE 1 TABLET BY MOUTH DAILY, Disp: 90 tablet, Rfl: 1    rizatriptan (Maxalt) 10 MG tablet, Take 1 tablet at onset of headache.  May repeat once in 2 hours.  Do not take more than 2 tabs a day or 8 tabs a month, Disp: 8 tablet, Rfl: 3    albuterol (PROVENTIL) (2.5 MG/3ML) 0.083% nebulizer solution, Take 2.5 mg by nebulization 4 (Four) Times a Day As Needed for Wheezing., Disp: 360 mL, Rfl: 3    buPROPion XL (Wellbutrin XL) 150 MG 24 hr tablet, Take 1 tablet by mouth Daily., Disp: 30 tablet, Rfl: 1    fexofenadine (Allegra Allergy) 180 MG tablet, Take 1 tablet by mouth Daily., Disp: 90 tablet, Rfl: 3  No current facility-administered medications for this visit.  MEDICATION LIST AND ALLERGIES REVIEWED.    Social History     Tobacco Use    Smoking status: Every Day     Current packs/day: 0.00     Average packs/day: 0.5 packs/day for 41.7 years (20.9 ttl pk-yrs)     Types: Cigarettes     Start date: 1981     Last attempt to quit: 2022     Years since quittin.9     Passive exposure: Current    Smokeless tobacco: Never    Tobacco comments:     quit with chantix in past   Vaping Use    Vaping status: Never Used   Substance Use Topics    Alcohol use: No    Drug use: No       FAMILY AND SOCIAL HISTORY REVIEWED.    Review of Systems   Constitutional:  Positive for fatigue. Negative for activity change, appetite change, fever and unexpected weight change.   HENT:  Positive for congestion. Negative for postnasal drip, rhinorrhea, sinus pressure, sore throat and voice change.    Eyes:  Negative for visual disturbance.   Respiratory:  Positive for shortness of breath. Negative for cough, chest tightness and  "wheezing.    Cardiovascular:  Negative for chest pain, palpitations and leg swelling.   Gastrointestinal:  Negative for abdominal distention, abdominal pain, nausea and vomiting.   Endocrine: Negative for cold intolerance and heat intolerance.   Genitourinary:  Negative for difficulty urinating and urgency.   Musculoskeletal:  Negative for arthralgias, back pain and neck pain.   Skin:  Negative for color change and pallor.   Allergic/Immunologic: Negative for environmental allergies and food allergies.   Neurological:  Negative for dizziness, syncope, weakness and light-headedness.   Hematological:  Negative for adenopathy. Does not bruise/bleed easily.   Psychiatric/Behavioral:  Positive for sleep disturbance. Negative for agitation and behavioral problems. The patient is nervous/anxious.    .    /82 (BP Location: Left arm, Patient Position: Sitting, Cuff Size: Adult)   Pulse 74   Temp 98.3 °F (36.8 °C) (Oral)   Resp 16   Ht 170.2 cm (67\")   Wt 64.5 kg (142 lb 4.8 oz)   LMP  (LMP Unknown)   BMI 22.29 kg/m²     Immunization History   Administered Date(s) Administered    Fluzone (or Fluarix & Flulaval for VFC) >6mos 10/23/2013, 10/14/2017    Fluzone Quad >6mos (Multi-dose) 11/02/2011, 10/23/2013    Influenza, Unspecified 11/02/2011    Pneumococcal Polysaccharide (PPSV23) 08/03/2011, 09/01/2020    Pneumococcal, Unspecified 08/03/2011    Tdap 08/03/2011       Physical Exam  Vitals and nursing note reviewed.   Constitutional:       Appearance: She is well-developed. She is not diaphoretic.   HENT:      Head: Normocephalic and atraumatic.   Eyes:      Pupils: Pupils are equal, round, and reactive to light.   Neck:      Thyroid: No thyromegaly.   Cardiovascular:      Rate and Rhythm: Normal rate and regular rhythm.      Heart sounds: Normal heart sounds. No murmur heard.     No friction rub. No gallop.   Pulmonary:      Effort: Pulmonary effort is normal. No respiratory distress.      Breath sounds: Normal " breath sounds. No wheezing or rales.   Chest:      Chest wall: No tenderness.   Abdominal:      General: Bowel sounds are normal.      Palpations: Abdomen is soft.      Tenderness: There is no abdominal tenderness.   Musculoskeletal:         General: No swelling. Normal range of motion.      Cervical back: Normal range of motion and neck supple.   Lymphadenopathy:      Cervical: No cervical adenopathy.   Skin:     General: Skin is warm and dry.      Capillary Refill: Capillary refill takes less than 2 seconds.   Neurological:      Mental Status: She is alert and oriented to person, place, and time.   Psychiatric:         Mood and Affect: Mood normal.         Behavior: Behavior normal.           RESULTS    Spirometry Interpretation: FVC 2.41 64% predicted, FEV1 0.68 23% predicted, FEV1/FVC 28% predicted, TLC 6.47 113% predicted, DLCO 54% predicted, severe obstruction with postbronchodilator response, no restriction and severe diffusion.    6-minute walk test: Patient ambulated 1300 feet and never desaturated below 92%, she does not meet qualifications for oxygen with activity, percent of predicted was 70%.    XR Chest 1 View    Result Date: 8/2/2024  Impression: Findings suggestive of COPD without acute cardiopulmonary abnormality. Electronically Signed: Ned Forrester MD  8/2/2024 6:13 AM EDT  Workstation ID: [id number]    PROBLEM LIST    Problem List Items Addressed This Visit          Gastrointestinal Abdominal     GERD (gastroesophageal reflux disease) (Chronic)       Pulmonary and Pneumonias    COPD (chronic obstructive pulmonary disease) - Primary    Relevant Medications    albuterol (PROVENTIL) (2.5 MG/3ML) 0.083% nebulizer solution    fexofenadine (Allegra Allergy) 180 MG tablet    albuterol sulfate HFA (PROVENTIL HFA;VENTOLIN HFA;PROAIR HFA) inhaler 4 puff (Completed)    Other Relevant Orders    Complete PFT - Pre & Post Bronchodilator (Completed)    Walking Oximetry (Completed)       Tobacco    Cigarette  nicotine dependence without complication    Relevant Medications    buPROPion XL (Wellbutrin XL) 150 MG 24 hr tablet    Other Relevant Orders    CT Chest Low Dose Wo     Other Visit Diagnoses       Allergic rhinitis, unspecified seasonality, unspecified trigger        Relevant Medications    fexofenadine (Allegra Allergy) 180 MG tablet              DISCUSSION    Ms. Quintero was here for a hospital follow-up.  She seems to be doing better than her hospitalization but she continues to feel poorly.  She does not appear to be infected in the office today.  We did review her PFTs in the office today and she has a severe obstruction.  I did stress the importance of using the Breztri 2 puffs twice a day and the albuterol as needed for shortness of breath or wheezing.  I did send in refills for her albuterol today.    I think that GERD could be contributing to some of her symptoms.  We did discuss taking Protonix daily.  We also discussed strict reflux precaution such as elevating head of bed at night, not eating to 3 hours before bed and avoiding foods that trigger reflux symptoms.    I will send in a prescription for Allegra for her to take every day for her allergies.    We did discuss smoking cessation office for 3 minutes.  She would like to start the Wellbutrin daily to help her quit smoking.  She states that she has had good success with this medication before.  She does qualify for yearly CT screenings.  Her next CT scan will be due in December 2024.    I think that her anxiety could be contributing to some of her symptoms as well.  I did advise her to discuss her anxiety with her PCP.    She will follow-up in 2 to 3 months.    I personally spent a total of 31 minutes on patient visit today including chart review, face to face with the patient obtaining the history and physical exam, review of pertinent images and tests, counseling and discussion and/or coordination of care as described above, and documentation.   Total time excludes time spent on other separate services such as performing procedures or test interpretation, if applicable.        Hannah ANEESH Price, APRN  08/21/202415:10 EDT  Electronically signed     Please note that portions of this note were completed with a voice recognition program.        CC: Coby Ely PA-C

## 2024-09-19 ENCOUNTER — OFFICE VISIT (OUTPATIENT)
Dept: FAMILY MEDICINE CLINIC | Facility: CLINIC | Age: 54
End: 2024-09-19
Payer: MEDICARE

## 2024-09-19 VITALS
DIASTOLIC BLOOD PRESSURE: 78 MMHG | OXYGEN SATURATION: 93 % | TEMPERATURE: 98.4 F | HEART RATE: 73 BPM | WEIGHT: 142.4 LBS | BODY MASS INDEX: 22.35 KG/M2 | HEIGHT: 67 IN | SYSTOLIC BLOOD PRESSURE: 162 MMHG

## 2024-09-19 DIAGNOSIS — F17.200 CURRENT SMOKER: ICD-10-CM

## 2024-09-19 DIAGNOSIS — T36.95XA ANTIBIOTIC-INDUCED YEAST INFECTION: ICD-10-CM

## 2024-09-19 DIAGNOSIS — J44.1 COPD WITH EXACERBATION: ICD-10-CM

## 2024-09-19 DIAGNOSIS — B37.9 ANTIBIOTIC-INDUCED YEAST INFECTION: ICD-10-CM

## 2024-09-19 DIAGNOSIS — J02.9 SORE THROAT: Primary | ICD-10-CM

## 2024-09-19 LAB
EXPIRATION DATE: NORMAL
EXPIRATION DATE: NORMAL
FLUAV AG UPPER RESP QL IA.RAPID: NOT DETECTED
FLUBV AG UPPER RESP QL IA.RAPID: NOT DETECTED
INTERNAL CONTROL: NORMAL
INTERNAL CONTROL: NORMAL
Lab: NORMAL
Lab: NORMAL
S PYO AG THROAT QL: NEGATIVE
SARS-COV-2 AG UPPER RESP QL IA.RAPID: NOT DETECTED

## 2024-09-19 PROCEDURE — 99214 OFFICE O/P EST MOD 30 MIN: CPT | Performed by: PHYSICIAN ASSISTANT

## 2024-09-19 PROCEDURE — 87880 STREP A ASSAY W/OPTIC: CPT | Performed by: PHYSICIAN ASSISTANT

## 2024-09-19 PROCEDURE — 3078F DIAST BP <80 MM HG: CPT | Performed by: PHYSICIAN ASSISTANT

## 2024-09-19 PROCEDURE — 1125F AMNT PAIN NOTED PAIN PRSNT: CPT | Performed by: PHYSICIAN ASSISTANT

## 2024-09-19 PROCEDURE — 87428 SARSCOV & INF VIR A&B AG IA: CPT | Performed by: PHYSICIAN ASSISTANT

## 2024-09-19 PROCEDURE — 3077F SYST BP >= 140 MM HG: CPT | Performed by: PHYSICIAN ASSISTANT

## 2024-09-19 RX ORDER — METHYLPREDNISOLONE 4 MG
TABLET, DOSE PACK ORAL
Qty: 1 EACH | Refills: 0 | Status: SHIPPED | OUTPATIENT
Start: 2024-09-19

## 2024-09-19 RX ORDER — ALBUTEROL SULFATE 0.83 MG/ML
2.5 SOLUTION RESPIRATORY (INHALATION) 4 TIMES DAILY PRN
Qty: 360 ML | Refills: 3 | Status: SHIPPED | OUTPATIENT
Start: 2024-09-19

## 2024-09-19 RX ORDER — NICOTINE 21 MG/24HR
1 PATCH, TRANSDERMAL 24 HOURS TRANSDERMAL EVERY 24 HOURS
Qty: 28 EACH | Refills: 0 | Status: SHIPPED | OUTPATIENT
Start: 2024-09-19

## 2024-09-19 RX ORDER — FLUCONAZOLE 150 MG/1
TABLET ORAL
Qty: 2 TABLET | Refills: 0 | Status: SHIPPED | OUTPATIENT
Start: 2024-09-19

## 2024-10-02 ENCOUNTER — OFFICE VISIT (OUTPATIENT)
Age: 54
End: 2024-10-02
Payer: MEDICARE

## 2024-10-02 VITALS
TEMPERATURE: 98.1 F | BODY MASS INDEX: 23.43 KG/M2 | SYSTOLIC BLOOD PRESSURE: 160 MMHG | WEIGHT: 149.3 LBS | RESPIRATION RATE: 16 BRPM | HEIGHT: 67 IN | HEART RATE: 64 BPM | DIASTOLIC BLOOD PRESSURE: 84 MMHG | OXYGEN SATURATION: 95 %

## 2024-10-02 DIAGNOSIS — K21.9 GASTROESOPHAGEAL REFLUX DISEASE WITHOUT ESOPHAGITIS: Chronic | ICD-10-CM

## 2024-10-02 DIAGNOSIS — R06.02 SHORTNESS OF BREATH: ICD-10-CM

## 2024-10-02 DIAGNOSIS — F17.210 CIGARETTE NICOTINE DEPENDENCE WITHOUT COMPLICATION: Primary | ICD-10-CM

## 2024-10-02 DIAGNOSIS — J43.9 PULMONARY EMPHYSEMA, UNSPECIFIED EMPHYSEMA TYPE: Chronic | ICD-10-CM

## 2024-10-02 RX ORDER — VARENICLINE TARTRATE 0.5 (11)-1
1 KIT ORAL 2 TIMES DAILY
Qty: 42 EACH | Refills: 0 | Status: SHIPPED | OUTPATIENT
Start: 2024-10-02

## 2024-10-02 RX ORDER — AZITHROMYCIN 250 MG/1
TABLET, FILM COATED ORAL
Qty: 36 TABLET | Refills: 3 | Status: SHIPPED | OUTPATIENT
Start: 2024-10-02

## 2024-10-02 RX ORDER — OMEPRAZOLE 40 MG/1
40 CAPSULE, DELAYED RELEASE ORAL DAILY
Qty: 90 CAPSULE | Refills: 3 | Status: SHIPPED | OUTPATIENT
Start: 2024-10-02

## 2024-10-02 NOTE — PROGRESS NOTES
[facility] Pulmonary Follow up    CHIEF COMPLAINT    congestion    HISTORY OF PRESENT ILLNESS    Arcelia Quintero is a 54 y.o.female here today for follow-up.  She was last seen in the office by me in August.  She went to her PCP in the middle of September and was given a round of antibiotics for COPD exacerbation.  She states her symptoms did clear up.  However she does feel like the congestion has returned.    She is coughing some clear secretions.  She denies any hemoptysis.  She denies any fever, chills or night sweats.    In April 2024 she was admitted to [facility] and had respiratory failure and was intubated.  It was felt that her respiratory failure was related to malignant hypertension.  She was treated with IV steroids and antibiotics her symptoms improved.  It was felt that she possibly could have had a seizure but however her EEG was normal on April 3, 2024.    She has been to the urgent treatment or ED monthly since May.  She received rounds of antibiotics and steroids and that her symptoms improved.    She continues to use her Breztri twice a day.  She is using the albuterol occasionally for shortness of breath or wheezing.  She does have nebulizers to use as needed as well.    She continues to have difficulty with reflux symptoms.  She had been on Protonix but did not feel like it was working well.  She would like to be switched to something else if possible.    She continues to wear oxygen at 2 L at nighttime.  She was advised by her PCP to increase to 3 L but had been waking up with headaches.    She denies any chest pain or palpitations.  Denies any lower extremity edema or calf tenderness.    She continues to smoke.  She is smoking at least a pack a day.  She does have a 21-pack-year history.  She would like to start Chantix if possible.      Patient Active Problem List   Diagnosis    Anxiety and depression    Chronic idiopathic constipation    Dyslipidemia    Emphysema/COPD    Essential  hypertension    GERD (gastroesophageal reflux disease)    Irritable bowel syndrome    Migraine    H/O Seizures on Keppra    Carpal tunnel syndrome    Internal hemorrhoids    Hemoptysis    Atypical chest pain    Agoraphobia    Genital ulcer, female    Cigarette nicotine dependence without complication    Bilateral leg numbness    Low back pain    History of Guillain-Hamilton syndrome due to influenza immunization    COPD (chronic obstructive pulmonary disease)    Cervical high risk HPV (human papillomavirus) test positive    Abnormal Pap smear of cervix    PTSD (post-traumatic stress disorder)    H/O PNES    AMS (altered mental status)    Hypokalemia    History of stroke    CKD (chronic kidney disease), stage III    Psychogenic nonepileptic seizure    Abnormal nuclear stress test    Other chest pain    COPD exacerbation    Acute on chronic respiratory failure with hypoxia       Allergies   Allergen Reactions    Codeine Hives    Influenza Vaccines Other (See Comments)     Got GB syndrome    Propofol Other (See Comments)     Propofol infusion reaction, weakness, tremors, paralysis       Current Outpatient Medications:     albuterol (PROVENTIL) (2.5 MG/3ML) 0.083% nebulizer solution, Take 2.5 mg by nebulization 4 (Four) Times a Day As Needed for Wheezing., Disp: 360 mL, Rfl: 3    albuterol sulfate  (90 Base) MCG/ACT inhaler, Inhale 2 puffs Every 4 (Four) Hours As Needed for Wheezing., Disp: 18 g, Rfl: 0    amLODIPine (NORVASC) 10 MG tablet, Take 1 tablet by mouth Daily., Disp: 90 tablet, Rfl: 0    aspirin 81 MG EC tablet, Take 1 tablet by mouth Daily., Disp: , Rfl:     atorvastatin (LIPITOR) 40 MG tablet, Take 1 tablet by mouth Daily., Disp: 30 tablet, Rfl: 1    Budeson-Glycopyrrol-Formoterol (BREZTRI) 160-9-4.8 MCG/ACT aerosol inhaler, Inhale 2 puffs 2 (Two) Times a Day., Disp: 10.7 g, Rfl: 11    carvedilol (COREG) 12.5 MG tablet, Take 1 tablet by mouth 2 (Two) Times a Day With Meals., Disp: 180 tablet, Rfl: 0     fexofenadine (Allegra Allergy) 180 MG tablet, Take 1 tablet by mouth Daily., Disp: 90 tablet, Rfl: 3    FLUoxetine (PROzac) 20 MG capsule, Take 1 capsule by mouth Daily., Disp: 30 capsule, Rfl: 0    lactulose (CHRONULAC) 10 GM/15ML solution, Take 45 mL by mouth 2 (Two) Times a Day., Disp: , Rfl:     levETIRAcetam (Keppra) 100 MG/ML solution, Take 5 mL by mouth 2 (Two) Times a Day., Disp: 300 mL, Rfl: 0    linaclotide (Linzess) 145 MCG capsule capsule, Take 1 capsule by mouth Every Morning Before Breakfast., Disp: 90 capsule, Rfl: 3    losartan (Cozaar) 50 MG tablet, Take 1 tablet by mouth Daily., Disp: 90 tablet, Rfl: 0    mirtazapine (REMERON) 7.5 MG tablet, Take 2 tablets by mouth Every Night., Disp: 30 tablet, Rfl: 0    nicotine (Nicoderm CQ) 21 MG/24HR patch, Place 1 patch on the skin as directed by provider Daily., Disp: 28 each, Rfl: 0    nystatin (MYCOSTATIN) 100,000 unit/mL suspension, Swish and swallow 5 mL 4 (Four) Times a Day., Disp: 473 mL, Rfl: 0    rizatriptan (Maxalt) 10 MG tablet, Take 1 tablet at onset of headache.  May repeat once in 2 hours.  Do not take more than 2 tabs a day or 8 tabs a month, Disp: 8 tablet, Rfl: 3    azithromycin (Zithromax) 250 MG tablet, Take 1 tablet every Monday, Wednesday, Friday., Disp: 36 tablet, Rfl: 3    omeprazole (priLOSEC) 40 MG capsule, Take 1 capsule by mouth Daily., Disp: 90 capsule, Rfl: 3    Varenicline Tartrate, Starter, 0.5 MG X 11 & 1 MG X 42 tablet therapy pack, Take 1 mg by mouth 2 (Two) Times a Day. Use as directed on package instructions, try to quit smoking after 1 week, Disp: 42 each, Rfl: 0  MEDICATION LIST AND ALLERGIES REVIEWED.    Social History     Tobacco Use    Smoking status: Every Day     Current packs/day: 0.00     Average packs/day: 0.5 packs/day for 41.7 years (20.9 ttl pk-yrs)     Types: Cigarettes     Start date: 1981     Last attempt to quit: 2022     Years since quittin.0     Passive exposure: Current    Smokeless tobacco:  "Never    Tobacco comments:     quit with chantix in past   Vaping Use    Vaping status: Never Used   Substance Use Topics    Alcohol use: No    Drug use: No       FAMILY AND SOCIAL HISTORY REVIEWED.    Review of Systems   Constitutional:  Positive for fatigue. Negative for activity change, appetite change, fever and unexpected weight change.   HENT:  Positive for congestion. Negative for postnasal drip, rhinorrhea, sinus pressure, sore throat and voice change.    Eyes:  Negative for visual disturbance.   Respiratory:  Positive for cough and shortness of breath. Negative for chest tightness and wheezing.    Cardiovascular:  Negative for chest pain, palpitations and leg swelling.   Gastrointestinal:  Negative for abdominal distention, abdominal pain, nausea and vomiting.   Endocrine: Negative for cold intolerance and heat intolerance.   Genitourinary:  Negative for difficulty urinating and urgency.   Musculoskeletal:  Negative for arthralgias, back pain and neck pain.   Skin:  Negative for color change and pallor.   Allergic/Immunologic: Negative for environmental allergies and food allergies.   Neurological:  Negative for dizziness, syncope, weakness and light-headedness.   Hematological:  Negative for adenopathy. Does not bruise/bleed easily.   Psychiatric/Behavioral:  Positive for sleep disturbance. Negative for agitation and behavioral problems.    .    /84 (BP Location: Right arm, Patient Position: Sitting, Cuff Size: Adult)   Pulse 64   Temp 98.1 °F (36.7 °C) (Oral)   Resp 16 Comment: room air resting  Ht 170.2 cm (67.01\")   Wt 67.7 kg (149 lb 4.8 oz)   LMP  (LMP Unknown)   SpO2 95% Comment: room air resting  BMI 23.38 kg/m²     Immunization History   Administered Date(s) Administered    Fluzone (or Fluarix & Flulaval for VFC) >6mos 10/23/2013, 10/14/2017    Fluzone Quad >6mos (Multi-dose) 11/02/2011, 10/23/2013    Influenza, Unspecified 11/02/2011    Pneumococcal Polysaccharide (PPSV23) " 08/03/2011, 09/01/2020    Pneumococcal, Unspecified 08/03/2011    Tdap 08/03/2011       Physical Exam  Vitals and nursing note reviewed.   Constitutional:       Appearance: She is well-developed. She is not diaphoretic.   HENT:      Head: Normocephalic and atraumatic.   Eyes:      Pupils: Pupils are equal, round, and reactive to light.   Neck:      Thyroid: No thyromegaly.   Cardiovascular:      Rate and Rhythm: Normal rate and regular rhythm.      Heart sounds: Normal heart sounds. No murmur heard.     No friction rub. No gallop.   Pulmonary:      Effort: Pulmonary effort is normal. No respiratory distress.      Breath sounds: Normal breath sounds. No wheezing or rales.   Chest:      Chest wall: No tenderness.   Abdominal:      General: Bowel sounds are normal.      Palpations: Abdomen is soft.      Tenderness: There is no abdominal tenderness.   Musculoskeletal:         General: No swelling. Normal range of motion.      Cervical back: Normal range of motion and neck supple.   Lymphadenopathy:      Cervical: No cervical adenopathy.   Skin:     General: Skin is warm and dry.      Capillary Refill: Capillary refill takes less than 2 seconds.   Neurological:      Mental Status: She is alert and oriented to person, place, and time.   Psychiatric:         Mood and Affect: Mood normal.         Behavior: Behavior normal.           RESULTS    XR Chest 1 View    Result Date: 8/2/2024  Impression: Findings suggestive of COPD without acute cardiopulmonary abnormality. Electronically Signed: Ned Forrester MD  8/2/2024 6:13 AM EDT  Workstation ID: [id number]    XR Chest PA & Lateral    Result Date: 8/1/2024  Impression: No evidence of acute cardiopulmonary disease. Electronically Signed: Freddie Sandoval MD  8/1/2024 3:36 PM EDT  Workstation ID: [id number]     PROBLEM LIST    Problem List Items Addressed This Visit          Gastrointestinal Abdominal     GERD (gastroesophageal reflux disease) (Chronic)    Relevant Medications     omeprazole (priLOSEC) 40 MG capsule       Pulmonary and Pneumonias    Emphysema/COPD (Chronic)    Overview     Chest x-ray 1/29/2013, reports chronic appearing changes of COPD.         Relevant Medications    azithromycin (Zithromax) 250 MG tablet       Tobacco    Cigarette nicotine dependence without complication - Primary    Relevant Medications    Varenicline Tartrate, Starter, 0.5 MG X 11 & 1 MG X 42 tablet therapy pack     Other Visit Diagnoses       Shortness of breath        Relevant Orders    CT Chest Hi Resolution              DISCUSSION    Ms. Quintero was here for follow-up.  Seems to be doing okay from a pulmonary standpoint as she is just recently getting over a COPD exacerbation.  She has had frequent exacerbations over the last several months.  I would like to get an HRCT to further evaluate as her chest x-rays have been clear in the last few months.    I am going to start her on azithromycin every Monday, Wednesday and Friday as she has had antibiotics and steroids at least every month since May.    She will continue the Breztri 2 puffs twice a day.  I did encourage her to use the nebulizers as needed and try to get these at least twice a day.    I did encourage her to decrease her oxygen to 2 L at nighttime due to her headaches in the mornings with 3 L.  I did encourage her to use the oxygen during the day if her oxygen saturations were lower than 88%.    She did have elevated blood pressure in the office today and I did encourage her to continue monitoring her blood pressure closely and if it does not lower by the afternoon she needs to be seen by her PCP or ED.    I am going to start her on omeprazole daily.  She may need to have omeprazole twice a day if she continues to have frequent infections.  Her reflux could be the culprit of her frequent infections.    We discussed smoking cessation in the office today.  She does want to quit and is willing to try Chantix.  I will send this into her pharmacy  today.    We will have her follow-up in 2 to 3 months.    I personally spent a total of 32 minutes on patient visit today including chart review, face to face with the patient obtaining the history and physical exam, review of pertinent images and tests, counseling and discussion and/or coordination of care as described above, and documentation.  Total time excludes time spent on other separate services such as performing procedures or test interpretation, if applicable.        Hannah Price, APRN  10/02/[id number]:45 EDT  Electronically signed     Please note that portions of this note were completed with a voice recognition program.        CC: Coby Ely PA-C

## 2024-10-16 ENCOUNTER — OFFICE VISIT (OUTPATIENT)
Dept: FAMILY MEDICINE CLINIC | Facility: CLINIC | Age: 54
End: 2024-10-16
Payer: MEDICARE

## 2024-10-16 VITALS
OXYGEN SATURATION: 96 % | HEIGHT: 67 IN | BODY MASS INDEX: 24.42 KG/M2 | HEART RATE: 70 BPM | SYSTOLIC BLOOD PRESSURE: 178 MMHG | WEIGHT: 155.6 LBS | DIASTOLIC BLOOD PRESSURE: 98 MMHG

## 2024-10-16 DIAGNOSIS — E78.5 DYSLIPIDEMIA: Chronic | ICD-10-CM

## 2024-10-16 DIAGNOSIS — R73.02 IMPAIRED GLUCOSE TOLERANCE: ICD-10-CM

## 2024-10-16 DIAGNOSIS — Z00.00 MEDICARE ANNUAL WELLNESS VISIT, SUBSEQUENT: Primary | ICD-10-CM

## 2024-10-16 DIAGNOSIS — J44.9 CHRONIC OBSTRUCTIVE PULMONARY DISEASE, UNSPECIFIED COPD TYPE: ICD-10-CM

## 2024-10-16 DIAGNOSIS — I10 ESSENTIAL HYPERTENSION: ICD-10-CM

## 2024-10-16 DIAGNOSIS — R56.9 SEIZURE: Chronic | ICD-10-CM

## 2024-10-16 DIAGNOSIS — Z00.00 PHYSICAL EXAM, ANNUAL: ICD-10-CM

## 2024-10-16 DIAGNOSIS — F41.9 ANXIETY AND DEPRESSION: Chronic | ICD-10-CM

## 2024-10-16 DIAGNOSIS — K59.04 CHRONIC IDIOPATHIC CONSTIPATION: ICD-10-CM

## 2024-10-16 DIAGNOSIS — F32.A ANXIETY AND DEPRESSION: Chronic | ICD-10-CM

## 2024-10-16 NOTE — PROGRESS NOTES
Subjective   The ABCs of the Annual Wellness Visit  Medicare Wellness Visit      Arcelia Quintero is a 54 y.o. patient who presents for a Medicare Wellness Visit.    The following portions of the patient's history were reviewed and   updated as appropriate: allergies, current medications, past family history, past medical history, past social history, past surgical history, and problem list.    Compared to one year ago, the patient's physical   health is the same.  Compared to one year ago, the patient's mental   health is the same.    Recent Hospitalizations:  This patient has had a [facility] admission record on file within the last 365 days.  Current Medical Providers:  Patient Care Team:  Coby Ely PA-C as PCP - General (Physician Assistant)  Carolina Tello MD as Consulting Physician (Neurology)  Elliott Hunt MD as Consulting Physician (Gastroenterology)  Ryder Delarosa III, MD as Cardiologist (Cardiology)  Hannah Price APRN as Nurse Practitioner (Pulmonary Disease)    Outpatient Medications Prior to Visit   Medication Sig Dispense Refill    albuterol (PROVENTIL) (2.5 MG/3ML) 0.083% nebulizer solution Take 2.5 mg by nebulization 4 (Four) Times a Day As Needed for Wheezing. 360 mL 3    albuterol sulfate  (90 Base) MCG/ACT inhaler Inhale 2 puffs Every 4 (Four) Hours As Needed for Wheezing. 18 g 0    amLODIPine (NORVASC) 10 MG tablet Take 1 tablet by mouth Daily. 90 tablet 0    aspirin 81 MG EC tablet Take 1 tablet by mouth Daily.      atorvastatin (LIPITOR) 40 MG tablet Take 1 tablet by mouth Daily. 30 tablet 1    azithromycin (Zithromax) 250 MG tablet Take 1 tablet every Monday, Wednesday, Friday. 36 tablet 3    Budeson-Glycopyrrol-Formoterol (BREZTRI) 160-9-4.8 MCG/ACT aerosol inhaler Inhale 2 puffs 2 (Two) Times a Day. 10.7 g 11    carvedilol (COREG) 12.5 MG tablet Take 1 tablet by mouth 2 (Two) Times a Day With Meals. 180 tablet 0    fexofenadine (Allegra Allergy) 180  MG tablet Take 1 tablet by mouth Daily. 90 tablet 3    FLUoxetine (PROzac) 20 MG capsule Take 1 capsule by mouth Daily. 30 capsule 0    lactulose (CHRONULAC) 10 GM/15ML solution Take 45 mL by mouth 2 (Two) Times a Day.      levETIRAcetam (Keppra) 100 MG/ML solution Take 5 mL by mouth 2 (Two) Times a Day. 300 mL 0    linaclotide (Linzess) 145 MCG capsule capsule Take 1 capsule by mouth Every Morning Before Breakfast. 90 capsule 3    losartan (Cozaar) 50 MG tablet Take 1 tablet by mouth Daily. 90 tablet 0    mirtazapine (REMERON) 7.5 MG tablet Take 2 tablets by mouth Every Night. 30 tablet 0    nicotine (Nicoderm CQ) 21 MG/24HR patch Place 1 patch on the skin as directed by provider Daily. 28 each 0    nystatin (MYCOSTATIN) 100,000 unit/mL suspension Swish and swallow 5 mL 4 (Four) Times a Day. 473 mL 0    omeprazole (priLOSEC) 40 MG capsule Take 1 capsule by mouth Daily. 90 capsule 3    rizatriptan (Maxalt) 10 MG tablet Take 1 tablet at onset of headache.  May repeat once in 2 hours.  Do not take more than 2 tabs a day or 8 tabs a month 8 tablet 3    Varenicline Tartrate, Starter, 0.5 MG X 11 & 1 MG X 42 tablet therapy pack Take 1 mg by mouth 2 (Two) Times a Day. Use as directed on package instructions, try to quit smoking after 1 week 42 each 0     No facility-administered medications prior to visit.     No opioid medication identified on active medication list. I have reviewed chart for other potential  high risk medication/s and harmful drug interactions in the elderly.      Aspirin is on active medication list. Aspirin use is indicated based on review of current medical condition/s. Pros and cons of this therapy have been discussed today. Benefits of this medication outweigh potential harm.  Patient has been encouraged to continue taking this medication.  .      Patient Active Problem List   Diagnosis    Anxiety and depression    Chronic idiopathic constipation    Dyslipidemia    Emphysema/COPD    Essential  "hypertension    GERD (gastroesophageal reflux disease)    Irritable bowel syndrome    Migraine    H/O Seizures on Keppra    Carpal tunnel syndrome    Internal hemorrhoids    Hemoptysis    Atypical chest pain    Agoraphobia    Genital ulcer, female    Cigarette nicotine dependence without complication    Bilateral leg numbness    Low back pain    History of Guillain-Wethersfield syndrome due to influenza immunization    COPD (chronic obstructive pulmonary disease)    Cervical high risk HPV (human papillomavirus) test positive    Abnormal Pap smear of cervix    PTSD (post-traumatic stress disorder)    H/O PNES    AMS (altered mental status)    Hypokalemia    History of stroke    CKD (chronic kidney disease), stage III    Psychogenic nonepileptic seizure    Abnormal nuclear stress test    Other chest pain    COPD exacerbation    Acute on chronic respiratory failure with hypoxia     Advance Care Planning Advance Directive is not on file.  ACP discussion was held with the patient during this visit. Patient does not have an advance directive, declines further assistance.            Objective   Vitals:    10/16/24 1021   BP: 178/98   BP Location: Left arm   Patient Position: Sitting   Cuff Size: Adult   Pulse: 70   SpO2: 96%   Weight: 70.6 kg (155 lb 9.6 oz)   Height: 170.2 cm (67.01\")   PainSc: 0-No pain       Estimated body mass index is 24.36 kg/m² as calculated from the following:    Height as of this encounter: 170.2 cm (67.01\").    Weight as of this encounter: 70.6 kg (155 lb 9.6 oz).    BMI is within normal parameters. No other follow-up for BMI required.       Does the patient have evidence of cognitive impairment? Yes                                                                                                Health  Risk Assessment    Smoking Status:  Social History     Tobacco Use   Smoking Status Every Day    Current packs/day: 0.00    Average packs/day: 0.5 packs/day for 41.7 years (20.9 ttl pk-yrs)    Types: " Cigarettes    Start date: 1981    Last attempt to quit: 2022    Years since quittin.0    Passive exposure: Current   Smokeless Tobacco Never   Tobacco Comments    quit with chantix in past     Alcohol Consumption:  Social History     Substance and Sexual Activity   Alcohol Use No       Fall Risk Screen  THEOADI Fall Risk Assessment was completed, and patient is at LOW risk for falls.Assessment completed on:10/16/2024    Depression Screening:      10/16/2024    10:25 AM   PHQ-2/PHQ-9 Depression Screening   Little interest or pleasure in doing things Not at all   Feeling down, depressed, or hopeless Several days   How difficult have these problems made it for you to do your work, take care of things at home, or get along with other people? Not difficult at all     Health Habits and Functional and Cognitive Screening:      10/16/2024    10:21 AM   Functional & Cognitive Status   Current Diet Well Balanced Diet   Dental Exam Not up to date   Eye Exam Not up to date   Exercise (times per week) 7 times per week   Current Exercises Include Walking   Who do you live with? Child   If you need help, do you have trouble finding someone available to you? No   Have you been bothered in the last four weeks by sexual problems? No   Do you have difficulty concentrating, remembering or making decisions? Yes           Age-appropriate Screening Schedule:  Refer to the list below for future screening recommendations based on patient's age, sex and/or medical conditions. Orders for these recommended tests are listed in the plan section. The patient has been provided with a written plan.    Health Maintenance List  Health Maintenance   Topic Date Due    LUNG CANCER SCREENING  2024    ZOSTER VACCINE (1 of 2) 10/16/2024 (Originally 2021)    COVID-19 Vaccine (1 - -24 season) 10/18/2024 (Originally 2024)    Pneumococcal Vaccine 0-64 (2 of 2 - PCV) 10/16/2025 (Originally 2021)    TDAP/TD VACCINES (2 - Td or  Tdap) 10/16/2025 (Originally 8/3/2021)    LIPID PANEL  12/27/2024    ANNUAL WELLNESS VISIT  10/16/2025    MAMMOGRAM  12/04/2025    PAP SMEAR  10/18/2026    COLORECTAL CANCER SCREENING  12/01/2028    HEPATITIS C SCREENING  Completed                                                                                                                                                CMS Preventative Services Quick Reference  Risk Factors Identified During Encounter  Immunizations Discussed/Encouraged:  patient had [place] after receiving a flu shot.  She was told to avoid all further vaccinations.  Vision Screening Recommended    The above risks/problems have been discussed with the patient.  Pertinent information has been shared with the patient in the After Visit Summary.  An After Visit Summary and PPPS were made available to the patient.    Follow Up:   Next Medicare Wellness visit to be scheduled in 1 year.         Additional E&M Note during same encounter follows:  Patient has additional, significant, and separately identifiable condition(s)/problem(s) that require work above and beyond the Medicare Wellness Visit     Chief Complaint  Medicare Wellness-subsequent and Annual Exam    Subjective   HPI  Arcelia is also being seen today for an annual adult preventative physical exam.     Review of Systems   Constitutional:  Negative for chills and fever.   HENT:  Negative for congestion, ear pain, postnasal drip, rhinorrhea, sinus pressure and sore throat.    Eyes:  Negative for visual disturbance.   Respiratory:  Negative for cough, shortness of breath and wheezing.    Cardiovascular:  Negative for chest pain, palpitations and leg swelling.   Gastrointestinal:  Negative for abdominal pain, blood in stool, diarrhea, nausea and vomiting.   Endocrine: Negative for polyuria.   Genitourinary:  Negative for dysuria, flank pain, frequency and hematuria.   Musculoskeletal:  Negative for arthralgias, gait problem and myalgias.  "  Skin:  Negative for rash.   Neurological:  Negative for dizziness and confusion.   Hematological:  Does not bruise/bleed easily.   Psychiatric/Behavioral:  Negative for agitation, dysphoric mood, self-injury, sleep disturbance and suicidal ideas. The patient is not nervous/anxious.               Objective   Vital Signs:  /98 (BP Location: Left arm, Patient Position: Sitting, Cuff Size: Adult)   Pulse 70   Ht 170.2 cm (67.01\")   Wt 70.6 kg (155 lb 9.6 oz)   SpO2 96%   BMI 24.36 kg/m²   Physical Exam  Vitals reviewed.   Constitutional:       General: She is not in acute distress.     Appearance: Normal appearance. She is well-developed and normal weight. She is not ill-appearing or diaphoretic.   HENT:      Head: Normocephalic and atraumatic.      Right Ear: Hearing, tympanic membrane, ear canal and external ear normal.      Left Ear: Hearing, tympanic membrane, ear canal and external ear normal.      Nose: Nose normal.      Right Sinus: No maxillary sinus tenderness or frontal sinus tenderness.      Left Sinus: No maxillary sinus tenderness or frontal sinus tenderness.      Mouth/Throat:      Pharynx: Uvula midline.      Comments: No teeth  Eyes:      General: Lids are normal.      Extraocular Movements: Extraocular movements intact.      Conjunctiva/sclera: Conjunctivae normal.   Neck:      Thyroid: No thyroid mass or thyromegaly.      Trachea: Trachea and phonation normal.   Cardiovascular:      Rate and Rhythm: Normal rate and regular rhythm.      Heart sounds: Normal heart sounds.   Pulmonary:      Effort: Pulmonary effort is normal. No respiratory distress.      Breath sounds: Decreased breath sounds present.   Abdominal:      General: Bowel sounds are normal. There is no distension.      Palpations: Abdomen is soft. Abdomen is not rigid.      Tenderness: There is no abdominal tenderness. There is no guarding.   Musculoskeletal:         General: Normal range of motion.      Cervical back: Normal " range of motion.      Right lower leg: No edema.      Left lower leg: No edema.   Lymphadenopathy:      Cervical: No cervical adenopathy.      Right cervical: No superficial cervical adenopathy.     Left cervical: No superficial cervical adenopathy.   Skin:     General: Skin is warm.      Findings: No erythema or rash.      Nails: There is no clubbing.   Neurological:      Mental Status: She is alert and oriented to person, place, and time.      Coordination: Coordination normal.      Gait: Gait normal.      Deep Tendon Reflexes: Reflexes are normal and symmetric.      Comments: CN grossly intact   Psychiatric:         Attention and Perception: Attention and perception normal. She is attentive.         Mood and Affect: Mood and affect normal.         Speech: Speech normal.         Behavior: Behavior normal. Behavior is cooperative.         Thought Content: Thought content normal.         Cognition and Memory: Cognition and memory normal.         Judgment: Judgment normal.         The following data was reviewed by: Coby Ely PA-C on 10/16/2024:        Assessment and Plan Additional age appropriate preventative wellness advice topics were discussed during today's preventative wellness exam(some topics already addressed during AWV portion of the note above):    Physical Activity: Advised cardiovascular activity 150 minutes per week as tolerated. (example brisk walk for 30 minutes, 5 days a week).     Nutrition: Discussed nutrition plan with patient. Information shared in after visit summary. Goal is for a well balanced diet to enhance overall health.              Medicare annual wellness visit, subsequent    Physical exam, annual  PE completed  Preventative labs ordered  Colonoscopy is up-to-date  Mammogram is up-to-date  Gynecologist - established, reports exam is up-to-date  Ophthalmologist - encouraged to go regularly  Vaccinations discussed - unable to obtain due to history of GB    H/O Seizures on  Nbole  Admits she is not taking regularly.  Has not had a seizure recently.  Missed 2 appointments with Dr. Tello.  Gave her phone number and she will call to reschedule appointment.  Encouraged her to take medicine as prescribed.  Dyslipidemia  On atorvastatin.  Essential hypertension  Elevated today on repeat.  She states she took medicine right before appointment.  Encouraged her to obtain BP cuff and monitor at home.  She can monitor at grocery store as well.  Call if BP is greater than 140/90.  HTN handout provided.  Followed by cardiology.  Impaired glucose tolerance    Anxiety and depression  Stable mood.  Chronic idiopathic constipation    Chronic obstructive pulmonary disease, unspecified COPD type  Followed by pulmonology.  On Chantix.  Quit smoking a week ago and doing great!      Orders Placed This Encounter   Procedures    TSH Rfx On Abnormal To Free T4     Standing Status:   Future     Standing Expiration Date:   10/16/2025     Order Specific Question:   Release to patient     Answer:   Routine Release [[id number]]    Lipid Panel     Standing Status:   Future     Standing Expiration Date:   10/16/2025     Order Specific Question:   Release to patient     Answer:   Routine Release [[id number]]    Hemoglobin A1c     Standing Status:   Future     Standing Expiration Date:   10/16/2025     Order Specific Question:   Release to patient     Answer:   Routine Release [[id number]]    Comprehensive Metabolic Panel     Standing Status:   Future     Standing Expiration Date:   10/16/2025     Order Specific Question:   Release to patient     Answer:   Routine Release [[id number]]           I spent 35 minutes caring for Arcelia on this date of service. This time includes time spent by me in the following activities:preparing for the visit, reviewing tests, obtaining and/or reviewing a separately obtained history, performing a medically appropriate examination and/or evaluation , counseling and educating the  patient/family/caregiver, ordering medications, tests, or procedures, referring and communicating with other health care professionals , documenting information in the medical record, independently interpreting results and communicating that information with the patient/family/caregiver, and care coordination  Follow Up   Return in about 6 months (around 4/16/2025) for Recheck, 6 month follow-up.  Patient was given instructions and counseling regarding her condition or for health maintenance advice. Please see specific information pulled into the AVS if appropriate.

## 2024-10-16 NOTE — ASSESSMENT & PLAN NOTE
Admits she is not taking regularly.  Has not had a seizure recently.  Missed 2 appointments with Dr. Tello.  Gave her phone number and she will call to reschedule appointment.  Encouraged her to take medicine as prescribed.

## 2024-10-16 NOTE — ASSESSMENT & PLAN NOTE
Elevated today on repeat.  She states she took medicine right before appointment.  Encouraged her to obtain BP cuff and monitor at home.  She can monitor at grocery store as well.  Call if BP is greater than 140/90.  HTN handout provided.  Followed by cardiology.

## 2024-10-17 ENCOUNTER — LAB (OUTPATIENT)
Dept: LAB | Facility: HOSPITAL | Age: 54
End: 2024-10-17
Payer: MEDICARE

## 2024-10-17 DIAGNOSIS — I10 ESSENTIAL HYPERTENSION: ICD-10-CM

## 2024-10-17 DIAGNOSIS — E78.5 DYSLIPIDEMIA: Chronic | ICD-10-CM

## 2024-10-17 DIAGNOSIS — F32.A ANXIETY AND DEPRESSION: Chronic | ICD-10-CM

## 2024-10-17 DIAGNOSIS — F41.9 ANXIETY AND DEPRESSION: Chronic | ICD-10-CM

## 2024-10-17 DIAGNOSIS — R73.02 IMPAIRED GLUCOSE TOLERANCE: ICD-10-CM

## 2024-10-17 LAB
ALBUMIN SERPL-MCNC: 4.3 G/DL (ref 3.5–5.2)
ALBUMIN/GLOB SERPL: 1.7 G/DL
ALP SERPL-CCNC: 101 U/L (ref 39–117)
ALT SERPL W P-5'-P-CCNC: 12 U/L (ref 1–33)
ANION GAP SERPL CALCULATED.3IONS-SCNC: 6.5 MMOL/L (ref 5–15)
AST SERPL-CCNC: 15 U/L (ref 1–32)
BILIRUB SERPL-MCNC: 0.6 MG/DL (ref 0–1.2)
BUN SERPL-MCNC: 11 MG/DL (ref 6–20)
BUN/CREAT SERPL: 8.8 (ref 7–25)
CALCIUM SPEC-SCNC: 10.1 MG/DL (ref 8.6–10.5)
CHLORIDE SERPL-SCNC: 104 MMOL/L (ref 98–107)
CHOLEST SERPL-MCNC: 212 MG/DL (ref 0–200)
CO2 SERPL-SCNC: 29.5 MMOL/L (ref 22–29)
CREAT SERPL-MCNC: 1.25 MG/DL (ref 0.57–1)
EGFRCR SERPLBLD CKD-EPI 2021: 51.6 ML/MIN/1.73
GLOBULIN UR ELPH-MCNC: 2.6 GM/DL
GLUCOSE SERPL-MCNC: 92 MG/DL (ref 65–99)
HBA1C MFR BLD: 5.6 % (ref 4.8–5.6)
HDLC SERPL-MCNC: 39 MG/DL (ref 40–60)
LDLC SERPL CALC-MCNC: 143 MG/DL (ref 0–100)
LDLC/HDLC SERPL: 3.58 {RATIO}
POTASSIUM SERPL-SCNC: 4.5 MMOL/L (ref 3.5–5.2)
PROT SERPL-MCNC: 6.9 G/DL (ref 6–8.5)
SODIUM SERPL-SCNC: 140 MMOL/L (ref 136–145)
TRIGL SERPL-MCNC: 166 MG/DL (ref 0–150)
TSH SERPL DL<=0.05 MIU/L-ACNC: 2.1 UIU/ML (ref 0.27–4.2)
VLDLC SERPL-MCNC: 30 MG/DL (ref 5–40)

## 2024-10-17 PROCEDURE — 84443 ASSAY THYROID STIM HORMONE: CPT

## 2024-10-17 PROCEDURE — 83036 HEMOGLOBIN GLYCOSYLATED A1C: CPT

## 2024-10-17 PROCEDURE — 80053 COMPREHEN METABOLIC PANEL: CPT

## 2024-10-17 PROCEDURE — 80061 LIPID PANEL: CPT

## 2024-10-18 DIAGNOSIS — R94.39 ABNORMAL NUCLEAR STRESS TEST: ICD-10-CM

## 2024-10-18 RX ORDER — ATORVASTATIN CALCIUM 80 MG/1
80 TABLET, FILM COATED ORAL NIGHTLY
Qty: 90 TABLET | Refills: 0 | Status: SHIPPED | OUTPATIENT
Start: 2024-10-18

## 2024-10-26 DIAGNOSIS — I10 ESSENTIAL HYPERTENSION: ICD-10-CM

## 2024-10-28 RX ORDER — LOSARTAN POTASSIUM 50 MG/1
50 TABLET ORAL DAILY
Qty: 90 TABLET | Refills: 0 | Status: SHIPPED | OUTPATIENT
Start: 2024-10-28

## 2024-10-28 RX ORDER — CARVEDILOL 12.5 MG/1
12.5 TABLET ORAL 2 TIMES DAILY WITH MEALS
Qty: 180 TABLET | Refills: 0 | Status: SHIPPED | OUTPATIENT
Start: 2024-10-28

## 2024-10-28 NOTE — TELEPHONE ENCOUNTER
Rx Refill Note  Requested Prescriptions     Pending Prescriptions Disp Refills    losartan (COZAAR) 50 MG tablet [Pharmacy Med Name: LOSARTAN POTASSIUM 50 MG TAB] 90 tablet 0     Sig: TAKE 1 TABLET BY MOUTH DAILY    carvedilol (COREG) 12.5 MG tablet [Pharmacy Med Name: CARVEDILOL 12.5 MG TABLET] 180 tablet 0     Sig: TAKE 1 TABLET BY MOUTH TWICE A DAY WITH A MEAL      Last office visit with prescribing clinician: 10/16/2024   Last telemedicine visit with prescribing clinician: Visit date not found   Next office visit with prescribing clinician: 4/16/2025                         Would you like a call back once the refill request has been completed: [] Yes [] No    If the office needs to give you a call back, can they leave a voicemail: [] Yes [] No    Glendy Medina MA  10/28/24, 13:32 EDT

## 2024-11-04 DIAGNOSIS — F17.210 CIGARETTE NICOTINE DEPENDENCE WITHOUT COMPLICATION: Primary | ICD-10-CM

## 2024-11-04 DIAGNOSIS — Z72.0 TOBACCO USE: ICD-10-CM

## 2024-11-04 RX ORDER — VARENICLINE TARTRATE 1 MG/1
1 TABLET, FILM COATED ORAL 2 TIMES DAILY
Qty: 60 TABLET | Refills: 1 | Status: SHIPPED | OUTPATIENT
Start: 2024-11-04

## 2024-11-04 NOTE — TELEPHONE ENCOUNTER
Pt called asking for a refill of her Chantix. This has been sent to [facility] pharmacy per pt.

## 2025-01-03 DIAGNOSIS — Z72.0 TOBACCO USE: ICD-10-CM

## 2025-01-03 DIAGNOSIS — F17.210 CIGARETTE NICOTINE DEPENDENCE WITHOUT COMPLICATION: ICD-10-CM

## 2025-01-03 RX ORDER — VARENICLINE TARTRATE 1 MG/1
1 TABLET, FILM COATED ORAL 2 TIMES DAILY
Qty: 60 TABLET | Refills: 1 | Status: SHIPPED | OUTPATIENT
Start: 2025-01-03

## 2025-01-03 NOTE — TELEPHONE ENCOUNTER
Refilled Rx Chantix 1 mg per chart via fax sent to [facility] per pt's request. Pt verbalized appreciation.

## 2025-01-27 DIAGNOSIS — I10 ESSENTIAL HYPERTENSION: ICD-10-CM

## 2025-01-27 RX ORDER — CARVEDILOL 12.5 MG/1
12.5 TABLET ORAL 2 TIMES DAILY WITH MEALS
Qty: 180 TABLET | Refills: 0 | Status: SHIPPED | OUTPATIENT
Start: 2025-01-27

## 2025-01-27 RX ORDER — LOSARTAN POTASSIUM 50 MG/1
50 TABLET ORAL DAILY
Qty: 90 TABLET | Refills: 0 | Status: SHIPPED | OUTPATIENT
Start: 2025-01-27

## 2025-01-27 NOTE — TELEPHONE ENCOUNTER
Rx Refill Note  Requested Prescriptions     Pending Prescriptions Disp Refills    losartan (COZAAR) 50 MG tablet [Pharmacy Med Name: LOSARTAN POTASSIUM 50 MG TAB] 90 tablet 0     Sig: TAKE 1 TABLET BY MOUTH DAILY    carvedilol (COREG) 12.5 MG tablet [Pharmacy Med Name: CARVEDILOL 12.5 MG TABLET] 180 tablet 0     Sig: TAKE 1 TABLET BY MOUTH TWICE A DAY WITH A MEAL      Last office visit with prescribing clinician: 10/16/2024   Last telemedicine visit with prescribing clinician: Visit date not found   Next office visit with prescribing clinician: 4/16/2025                         Would you like a call back once the refill request has been completed: [] Yes [] No    If the office needs to give you a call back, can they leave a voicemail: [] Yes [] No    Glendy Medina MA  01/27/25, 10:05 EST  
DISCHARGE ISSUE - LACK OF APPROPRIATE OUTPATIENT SERVICES

## 2025-03-06 DIAGNOSIS — F17.210 CIGARETTE NICOTINE DEPENDENCE WITHOUT COMPLICATION: ICD-10-CM

## 2025-03-06 DIAGNOSIS — Z72.0 TOBACCO USE: ICD-10-CM

## 2025-03-06 RX ORDER — VARENICLINE TARTRATE 1 MG/1
1 TABLET, FILM COATED ORAL 2 TIMES DAILY
Qty: 60 TABLET | Refills: 1 | Status: SHIPPED | OUTPATIENT
Start: 2025-03-06

## 2025-03-12 ENCOUNTER — OFFICE VISIT (OUTPATIENT)
Dept: FAMILY MEDICINE CLINIC | Facility: CLINIC | Age: 55
End: 2025-03-12
Payer: MEDICARE

## 2025-03-12 VITALS
TEMPERATURE: 97.9 F | SYSTOLIC BLOOD PRESSURE: 184 MMHG | OXYGEN SATURATION: 97 % | BODY MASS INDEX: 25.87 KG/M2 | WEIGHT: 164.8 LBS | HEIGHT: 67 IN | DIASTOLIC BLOOD PRESSURE: 92 MMHG | HEART RATE: 73 BPM

## 2025-03-12 DIAGNOSIS — R51.9 DAILY HEADACHE: Primary | ICD-10-CM

## 2025-03-12 DIAGNOSIS — R06.09 EXERTIONAL DYSPNEA: ICD-10-CM

## 2025-03-12 DIAGNOSIS — I10 ESSENTIAL HYPERTENSION: ICD-10-CM

## 2025-03-12 DIAGNOSIS — J44.9 CHRONIC OBSTRUCTIVE PULMONARY DISEASE, UNSPECIFIED COPD TYPE: ICD-10-CM

## 2025-03-12 DIAGNOSIS — F33.2 SEVERE EPISODE OF RECURRENT MAJOR DEPRESSIVE DISORDER, WITHOUT PSYCHOTIC FEATURES: ICD-10-CM

## 2025-03-12 DIAGNOSIS — F17.210 CIGARETTE NICOTINE DEPENDENCE WITHOUT COMPLICATION: ICD-10-CM

## 2025-03-12 RX ORDER — CARVEDILOL 12.5 MG/1
12.5 TABLET ORAL 2 TIMES DAILY WITH MEALS
Qty: 180 TABLET | Refills: 0 | Status: SHIPPED | OUTPATIENT
Start: 2025-03-12

## 2025-03-12 RX ORDER — ALBUTEROL SULFATE 0.83 MG/ML
2.5 SOLUTION RESPIRATORY (INHALATION) 4 TIMES DAILY PRN
Qty: 360 ML | Refills: 3 | Status: SHIPPED | OUTPATIENT
Start: 2025-03-12

## 2025-03-12 RX ORDER — AMLODIPINE BESYLATE 10 MG/1
10 TABLET ORAL NIGHTLY
Qty: 90 TABLET | Refills: 0 | Status: SHIPPED | OUTPATIENT
Start: 2025-03-12

## 2025-03-12 RX ORDER — ALBUTEROL SULFATE 90 UG/1
2 INHALANT RESPIRATORY (INHALATION) EVERY 4 HOURS PRN
Qty: 18 G | Refills: 0 | Status: SHIPPED | OUTPATIENT
Start: 2025-03-12

## 2025-03-12 RX ORDER — VARENICLINE TARTRATE 1 MG/1
1 TABLET, FILM COATED ORAL 2 TIMES DAILY
Qty: 60 TABLET | Refills: 1 | Status: SHIPPED | OUTPATIENT
Start: 2025-03-12

## 2025-03-12 RX ORDER — LOSARTAN POTASSIUM 50 MG/1
50 TABLET ORAL EVERY MORNING
Qty: 90 TABLET | Refills: 0 | Status: SHIPPED | OUTPATIENT
Start: 2025-03-12

## 2025-03-12 RX ORDER — ATORVASTATIN CALCIUM 80 MG/1
80 TABLET, FILM COATED ORAL NIGHTLY
Qty: 90 TABLET | Refills: 0 | Status: SHIPPED | OUTPATIENT
Start: 2025-03-12

## 2025-03-12 RX ORDER — LEVETIRACETAM 500 MG/1
500 TABLET ORAL 2 TIMES DAILY
COMMUNITY
End: 2025-03-12

## 2025-03-12 NOTE — PROGRESS NOTES
Chief Complaint   Patient presents with    Cough     Shortness of breath, headaches. Started last week         Arcelia Quintero is a 54 y.o. female who presents for Cough (Shortness of breath, headaches. Started last week)    Patient reports cough and shortness of breath for the last week.  No significant cough, fever, chills or facial pain.  Not using her inhalers.  Quit smoking in October 2024.  Still on chantix, not using nicoderm patches.  Most of her blood pressure medications are in storage.  She states she has not had her medicine today.      Denies history of seizures.  Not taking Keppra regularly.  States she had an anesthesia reaction, not a seizure.      Past Medical History:   Diagnosis Date    Abnormal Pap smear of cervix     Allergic rhinitis     Asthma     Atypical chest pain     Brain tumor     Status post brain tumor resection in 1989.    Candidiasis of mouth     Cervical high risk HPV (human papillomavirus) test positive 09/01/2020    CKD (chronic kidney disease), stage III     Colon polyps 09/09/2020    sessile and tubular adenoma    Conversion disorder     COPD (chronic obstructive pulmonary disease)     COVID-19     Elevated liver enzymes     Former smoker 07/05/2023    GERD (gastroesophageal reflux disease)     H/O Helicobacter infection      Status post EGD 9/4/2012, pathology revealed H. pylori infection.    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     Neurology evaluation thought it was numbness from carpal tunnel and not Guillain-Madison    Hyperlipidemia     Hypertension     Influenza     Internal hemorrhoids     Low grade squamous intraepith lesion on cytologic smear cervix (lgsil) 09/01/2020    Median neuropathy     Migraine     PTSD (post-traumatic stress disorder)     Seizures     2 seizures after surgery    Small fiber neuropathy     Stroke     Tinea corporis        Past Surgical History:   Procedure Laterality Date    BRAIN SURGERY       status post brain tumor resection    CARDIAC CATHETERIZATION N/A 2023    Procedure: Left Heart Cath;  Surgeon: Ryder Delarosa III, MD;  Location: [facility] CATH INVASIVE LOCATION;  Service: Cardiovascular;  Laterality: N/A;    COLONOSCOPY      polyp removal    LIVER BIOPSY      UPPER GASTROINTESTINAL ENDOSCOPY      Status post EGD 2012, pathology revealed H. pylori infection.       Family History   Adopted: Yes   Problem Relation Age of Onset    Lung disease Mother     Heart disease Mother     Stroke Mother     Throat cancer Mother     Uterine cancer Mother     Ovarian cancer Mother     Prostate cancer Father     No Known Problems Sister     No Known Problems Brother     Breast cancer Other     Diabetes Other     Colon cancer Other        Social History     Socioeconomic History    Marital status: Legally    Tobacco Use    Smoking status: Every Day     Current packs/day: 0.00     Average packs/day: 0.5 packs/day for 41.7 years (20.9 ttl pk-yrs)     Types: Cigarettes     Start date: 1981     Last attempt to quit: 2022     Years since quittin.4     Passive exposure: Current    Smokeless tobacco: Never    Tobacco comments:     quit with chantix in past   Vaping Use    Vaping status: Never Used   Substance and Sexual Activity    Alcohol use: No    Drug use: No    Sexual activity: Yes     Partners: Male     Birth control/protection: Post-menopausal       Allergies   Allergen Reactions    Codeine Hives    Influenza Vaccines Other (See Comments)     Got GB syndrome    Propofol Other (See Comments)     Propofol infusion reaction, weakness, tremors, paralysis       ROS    Review of Systems   Constitutional:  Negative for chills, fatigue and fever.   HENT:  Negative for congestion, ear pain, rhinorrhea, sinus pressure and sore throat.    Respiratory:  Positive for shortness of breath and wheezing. Negative for cough.    Cardiovascular:  Negative for chest pain, palpitations and leg swelling.  "  Musculoskeletal:  Negative for myalgias.   Neurological:  Negative for dizziness.       Vitals:    03/12/25 0859   BP: (!) 184/92   Pulse: 73   Temp: 97.9 °F (36.6 °C)   TempSrc: Infrared   SpO2: 97%   Weight: 74.8 kg (164 lb 12.8 oz)   Height: 170.2 cm (67.01\")   PainSc: 7    PainLoc: Head     Body mass index is 25.8 kg/m².    Current Outpatient Medications on File Prior to Visit   Medication Sig Dispense Refill    aspirin 81 MG EC tablet Take 1 tablet by mouth Daily.      azithromycin (Zithromax) 250 MG tablet Take 1 tablet every Monday, Wednesday, Friday. 36 tablet 3    fexofenadine (Allegra Allergy) 180 MG tablet Take 1 tablet by mouth Daily. 90 tablet 3    lactulose (CHRONULAC) 10 GM/15ML solution Take 45 mL by mouth 2 (Two) Times a Day.      linaclotide (Linzess) 145 MCG capsule capsule Take 1 capsule by mouth Every Morning Before Breakfast. 90 capsule 3    mirtazapine (REMERON) 7.5 MG tablet Take 2 tablets by mouth Every Night. 30 tablet 0    nystatin (MYCOSTATIN) 100,000 unit/mL suspension Swish and swallow 5 mL 4 (Four) Times a Day. 473 mL 0    omeprazole (priLOSEC) 40 MG capsule Take 1 capsule by mouth Daily. 90 capsule 3    rizatriptan (Maxalt) 10 MG tablet Take 1 tablet at onset of headache.  May repeat once in 2 hours.  Do not take more than 2 tabs a day or 8 tabs a month 8 tablet 3    [DISCONTINUED] albuterol (PROVENTIL) (2.5 MG/3ML) 0.083% nebulizer solution Take 2.5 mg by nebulization 4 (Four) Times a Day As Needed for Wheezing. 360 mL 3    [DISCONTINUED] albuterol sulfate  (90 Base) MCG/ACT inhaler Inhale 2 puffs Every 4 (Four) Hours As Needed for Wheezing. 18 g 0    [DISCONTINUED] amLODIPine (NORVASC) 10 MG tablet Take 1 tablet by mouth Daily. 90 tablet 0    [DISCONTINUED] atorvastatin (LIPITOR) 80 MG tablet Take 1 tablet by mouth Every Night. 90 tablet 0    [DISCONTINUED] Budeson-Glycopyrrol-Formoterol (BREZTRI) 160-9-4.8 MCG/ACT aerosol inhaler Inhale 2 puffs 2 (Two) Times a Day. 10.7 g " 11    [DISCONTINUED] carvedilol (COREG) 12.5 MG tablet TAKE 1 TABLET BY MOUTH TWICE A DAY WITH A MEAL 180 tablet 0    [DISCONTINUED] FLUoxetine (PROzac) 20 MG capsule Take 1 capsule by mouth Daily. 30 capsule 0    [DISCONTINUED] levETIRAcetam (Keppra) 100 MG/ML solution Take 5 mL by mouth 2 (Two) Times a Day. 300 mL 0    [DISCONTINUED] levETIRAcetam (KEPPRA) 500 MG tablet Take 1 tablet by mouth 2 (Two) Times a Day.      [DISCONTINUED] losartan (COZAAR) 50 MG tablet TAKE 1 TABLET BY MOUTH DAILY 90 tablet 0    [DISCONTINUED] nicotine (Nicoderm CQ) 21 MG/24HR patch Place 1 patch on the skin as directed by provider Daily. 28 each 0    [DISCONTINUED] varenicline (CHANTIX) 1 MG tablet Take 1 tablet by mouth 2 (Two) Times a Day. 60 tablet 1    [DISCONTINUED] Varenicline Tartrate, Starter, 0.5 MG X 11 & 1 MG X 42 tablet therapy pack Take 1 mg by mouth 2 (Two) Times a Day. Use as directed on package instructions, try to quit smoking after 1 week 42 each 0     No current facility-administered medications on file prior to visit.       Results for orders placed or performed in visit on 10/17/24   TSH Rfx On Abnormal To Free T4    Collection Time: 10/17/24 10:04 AM    Specimen: Blood   Result Value Ref Range    TSH 2.100 0.270 - 4.200 uIU/mL   Lipid Panel    Collection Time: 10/17/24 10:04 AM    Specimen: Blood   Result Value Ref Range    Total Cholesterol 212 (H) 0 - 200 mg/dL    Triglycerides 166 (H) 0 - 150 mg/dL    HDL Cholesterol 39 (L) 40 - 60 mg/dL    LDL Cholesterol  143 (H) 0 - 100 mg/dL    VLDL Cholesterol 30 5 - 40 mg/dL    LDL/HDL Ratio 3.58    Hemoglobin A1c    Collection Time: 10/17/24 10:04 AM    Specimen: Blood   Result Value Ref Range    Hemoglobin A1C 5.60 4.80 - 5.60 %   Comprehensive Metabolic Panel    Collection Time: 10/17/24 10:04 AM    Specimen: Blood   Result Value Ref Range    Glucose 92 65 - 99 mg/dL    BUN 11 6 - 20 mg/dL    Creatinine 1.25 (H) 0.57 - 1.00 mg/dL    Sodium 140 136 - 145 mmol/L     Potassium 4.5 3.5 - 5.2 mmol/L    Chloride 104 98 - 107 mmol/L    CO2 29.5 (H) 22.0 - 29.0 mmol/L    Calcium 10.1 8.6 - 10.5 mg/dL    Total Protein 6.9 6.0 - 8.5 g/dL    Albumin 4.3 3.5 - 5.2 g/dL    ALT (SGPT) 12 1 - 33 U/L    AST (SGOT) 15 1 - 32 U/L    Alkaline Phosphatase 101 39 - 117 U/L    Total Bilirubin 0.6 0.0 - 1.2 mg/dL    Globulin 2.6 gm/dL    A/G Ratio 1.7 g/dL    BUN/Creatinine Ratio 8.8 7.0 - 25.0    Anion Gap 6.5 5.0 - 15.0 mmol/L    eGFR 51.6 (L) >60.0 mL/min/1.73       PE    Physical Exam  Vitals reviewed.   Constitutional:       General: She is not in acute distress.     Appearance: She is well-developed and normal weight. She is not ill-appearing or diaphoretic.   HENT:      Head: Normocephalic and atraumatic.      Right Ear: Hearing, tympanic membrane, ear canal and external ear normal.      Left Ear: Hearing, tympanic membrane, ear canal and external ear normal.      Nose: Nose normal.      Right Sinus: No maxillary sinus tenderness or frontal sinus tenderness.      Left Sinus: No maxillary sinus tenderness or frontal sinus tenderness.      Mouth/Throat:      Pharynx: Uvula midline. No posterior oropharyngeal erythema.   Eyes:      Conjunctiva/sclera: Conjunctivae normal.   Cardiovascular:      Rate and Rhythm: Normal rate and regular rhythm.      Heart sounds: Normal heart sounds. No murmur heard.     No friction rub. No gallop.   Pulmonary:      Effort: Pulmonary effort is normal. No respiratory distress.      Breath sounds: Decreased air movement present. Decreased breath sounds present. No wheezing, rhonchi or rales.   Musculoskeletal:         General: Normal range of motion.      Cervical back: Normal range of motion.   Skin:     General: Skin is warm.      Findings: No erythema.   Neurological:      Mental Status: She is alert and oriented to person, place, and time.   Psychiatric:         Behavior: Behavior normal.         Thought Content: Thought content normal.         Judgment:  Judgment normal.          A/P    Diagnoses and all orders for this visit:    1. Daily headache (Primary)  BP, migraine vs. Viral prodrome.  Take BP meds today.  Nurtec sample provided.  Possibly had Covid or another viral prodrome but it has been a week.  Call if headaches do not improve.    2. Essential hypertension  -     amLODIPine (NORVASC) 10 MG tablet; Take 1 tablet by mouth Every Night.  Dispense: 90 tablet; Refill: 0  -     carvedilol (COREG) 12.5 MG tablet; Take 1 tablet by mouth 2 (Two) Times a Day With Meals.  Dispense: 180 tablet; Refill: 0  -     losartan (COZAAR) 50 MG tablet; Take 1 tablet by mouth Every Morning.  Dispense: 90 tablet; Refill: 0  -     atorvastatin (LIPITOR) 80 MG tablet; Take 1 tablet by mouth Every Night.  Dispense: 90 tablet; Refill: 0  Patient is not on medication.  Blood pressure is elevated and is most likely cause of headache.  She states she has aura as well and has history of migraines.  Recommend restarting medicine today and monitoring at home.  Return in 2 weeks.    3. Exertional dyspnea  Pulse ox is 97% on room air.  Not using inhalers.  Remains cigarette free.  Restart inhalers.  Lungs have diminished breath sounds but no wheezing, rales or rhonci.  Call if symptoms worsen or do not improve with inhalers.    4. Chronic obstructive pulmonary disease, unspecified COPD type  -     albuterol sulfate  (90 Base) MCG/ACT inhaler; Inhale 2 puffs Every 4 (Four) Hours As Needed for Wheezing.  Dispense: 18 g; Refill: 0  -     Budeson-Glycopyrrol-Formoterol (BREZTRI) 160-9-4.8 MCG/ACT aerosol inhaler; Inhale 2 puffs 2 (Two) Times a Day.  Dispense: 10.7 g; Refill: 11  -     albuterol (PROVENTIL) (2.5 MG/3ML) 0.083% nebulizer solution; Take 2.5 mg by nebulization 4 (Four) Times a Day As Needed for Wheezing.  Dispense: 360 mL; Refill: 3    5. Cigarette nicotine dependence without complication  -     varenicline (CHANTIX) 1 MG tablet; Take 1 tablet by mouth 2 (Two) Times a Day.   Dispense: 60 tablet; Refill: 1    6. Severe episode of recurrent major depressive disorder, without psychotic features  -     FLUoxetine (PROzac) 20 MG capsule; Take 1 capsule by mouth Daily.  Dispense: 90 capsule; Refill: 0         Plan of care reviewed with patient at the conclusion of today's visit. Education was provided regarding diagnosis, management and any prescribed or recommended OTC medications.  Patient verbalizes understanding of and agreement with management plan.    Dictated Utilizing Dragon Dictation     Please note that portions of this note were completed with a voice recognition program.     Part of this note may be an electronic transcription/translation of spoken language to printed text using the Dragon Dictation System.    Return in about 2 weeks (around 3/26/2025) for Recheck, HTN.     Coby Ely PA-C

## 2025-03-25 DIAGNOSIS — I10 ESSENTIAL HYPERTENSION: ICD-10-CM

## 2025-03-25 RX ORDER — ATORVASTATIN CALCIUM 80 MG/1
80 TABLET, FILM COATED ORAL NIGHTLY
Qty: 90 TABLET | Refills: 0 | OUTPATIENT
Start: 2025-03-25

## 2025-06-06 DIAGNOSIS — I10 ESSENTIAL HYPERTENSION: ICD-10-CM

## 2025-06-06 DIAGNOSIS — F33.2 SEVERE EPISODE OF RECURRENT MAJOR DEPRESSIVE DISORDER, WITHOUT PSYCHOTIC FEATURES: ICD-10-CM

## 2025-06-06 RX ORDER — ATORVASTATIN CALCIUM 80 MG/1
80 TABLET, FILM COATED ORAL NIGHTLY
Qty: 90 TABLET | Refills: 0 | Status: SHIPPED | OUTPATIENT
Start: 2025-06-06

## 2025-06-06 RX ORDER — LOSARTAN POTASSIUM 50 MG/1
50 TABLET ORAL EVERY MORNING
Qty: 90 TABLET | Refills: 0 | Status: SHIPPED | OUTPATIENT
Start: 2025-06-06

## 2025-06-06 RX ORDER — CARVEDILOL 12.5 MG/1
TABLET ORAL
Qty: 180 TABLET | Refills: 0 | Status: SHIPPED | OUTPATIENT
Start: 2025-06-06

## 2025-06-10 PROCEDURE — 99285 EMERGENCY DEPT VISIT HI MDM: CPT

## 2025-06-11 ENCOUNTER — HOSPITAL ENCOUNTER (EMERGENCY)
Facility: HOSPITAL | Age: 55
Discharge: HOME | End: 2025-06-11
Attending: EMERGENCY MEDICINE
Payer: MEDICARE

## 2025-06-11 ENCOUNTER — APPOINTMENT (OUTPATIENT)
Dept: GENERAL RADIOLOGY | Facility: HOSPITAL | Age: 55
End: 2025-06-11
Payer: MEDICARE

## 2025-06-11 ENCOUNTER — PATIENT ROUNDING (BHMG ONLY) (OUTPATIENT)
Dept: URGENT CARE | Facility: CLINIC | Age: 55
End: 2025-06-11
Payer: MEDICARE

## 2025-06-11 ENCOUNTER — APPOINTMENT (OUTPATIENT)
Dept: CT IMAGING | Facility: HOSPITAL | Age: 55
End: 2025-06-11
Payer: MEDICARE

## 2025-06-11 VITALS
BODY MASS INDEX: 23.54 KG/M2 | WEIGHT: 150 LBS | RESPIRATION RATE: 18 BRPM | OXYGEN SATURATION: 91 % | TEMPERATURE: 98.2 F | HEART RATE: 60 BPM | DIASTOLIC BLOOD PRESSURE: 96 MMHG | HEIGHT: 67 IN | SYSTOLIC BLOOD PRESSURE: 177 MMHG

## 2025-06-11 DIAGNOSIS — Z86.73 HISTORY OF STROKE: ICD-10-CM

## 2025-06-11 DIAGNOSIS — G62.9 SMALL FIBER NEUROPATHY: ICD-10-CM

## 2025-06-11 DIAGNOSIS — Z86.69 HISTORY OF GUILLAIN-BARRE SYNDROME: ICD-10-CM

## 2025-06-11 DIAGNOSIS — Z86.39 HISTORY OF HYPERLIPIDEMIA: ICD-10-CM

## 2025-06-11 DIAGNOSIS — M79.10 MYALGIA: ICD-10-CM

## 2025-06-11 DIAGNOSIS — R07.89 ATYPICAL CHEST PAIN: Primary | ICD-10-CM

## 2025-06-11 DIAGNOSIS — F17.200 TOBACCO DEPENDENCE: ICD-10-CM

## 2025-06-11 DIAGNOSIS — Z87.09 HISTORY OF COPD: ICD-10-CM

## 2025-06-11 DIAGNOSIS — Z86.79 HISTORY OF HYPERTENSION: ICD-10-CM

## 2025-06-11 DIAGNOSIS — I10 ELEVATED BLOOD PRESSURE READING WITH DIAGNOSIS OF HYPERTENSION: ICD-10-CM

## 2025-06-11 DIAGNOSIS — M54.9 UPPER BACK PAIN: ICD-10-CM

## 2025-06-11 LAB
ALBUMIN SERPL-MCNC: 3.8 G/DL (ref 3.5–5.2)
ALBUMIN/GLOB SERPL: 1.5 G/DL
ALP SERPL-CCNC: 87 U/L (ref 39–117)
ALT SERPL W P-5'-P-CCNC: <5 U/L (ref 1–33)
ANION GAP SERPL CALCULATED.3IONS-SCNC: 10 MMOL/L (ref 5–15)
AST SERPL-CCNC: 25 U/L (ref 1–32)
BASOPHILS # BLD AUTO: 0.02 10*3/MM3 (ref 0–0.2)
BASOPHILS NFR BLD AUTO: 0.2 % (ref 0–1.5)
BILIRUB SERPL-MCNC: 0.2 MG/DL (ref 0–1.2)
BUN SERPL-MCNC: 17.5 MG/DL (ref 6–20)
BUN/CREAT SERPL: 21.3 (ref 7–25)
CALCIUM SPEC-SCNC: 8.5 MG/DL (ref 8.6–10.5)
CHLORIDE SERPL-SCNC: 102 MMOL/L (ref 98–107)
CO2 SERPL-SCNC: 24 MMOL/L (ref 22–29)
CREAT BLDA-MCNC: 0.9 MG/DL (ref 0.6–1.3)
CREAT SERPL-MCNC: 0.82 MG/DL (ref 0.57–1)
DEPRECATED RDW RBC AUTO: 40.9 FL (ref 37–54)
EGFRCR SERPLBLD CKD-EPI 2021: 85.7 ML/MIN/1.73
EOSINOPHIL # BLD AUTO: 0.03 10*3/MM3 (ref 0–0.4)
EOSINOPHIL NFR BLD AUTO: 0.3 % (ref 0.3–6.2)
ERYTHROCYTE [DISTWIDTH] IN BLOOD BY AUTOMATED COUNT: 13 % (ref 12.3–15.4)
GEN 5 1HR TROPONIN T REFLEX: <6 NG/L
GLOBULIN UR ELPH-MCNC: 2.5 GM/DL
GLUCOSE SERPL-MCNC: 134 MG/DL (ref 65–99)
HCT VFR BLD AUTO: 40.8 % (ref 34–46.6)
HGB BLD-MCNC: 14.9 G/DL (ref 12–15.9)
HOLD SPECIMEN: NORMAL
IMM GRANULOCYTES # BLD AUTO: 0.09 10*3/MM3 (ref 0–0.05)
IMM GRANULOCYTES NFR BLD AUTO: 0.8 % (ref 0–0.5)
LIPASE SERPL-CCNC: 51 U/L (ref 13–60)
LYMPHOCYTES # BLD AUTO: 3.25 10*3/MM3 (ref 0.7–3.1)
LYMPHOCYTES NFR BLD AUTO: 29.5 % (ref 19.6–45.3)
MCH RBC QN AUTO: 31.6 PG (ref 26.6–33)
MCHC RBC AUTO-ENTMCNC: 36.5 G/DL (ref 31.5–35.7)
MCV RBC AUTO: 86.4 FL (ref 79–97)
MONOCYTES # BLD AUTO: 0.71 10*3/MM3 (ref 0.1–0.9)
MONOCYTES NFR BLD AUTO: 6.4 % (ref 5–12)
NEUTROPHILS NFR BLD AUTO: 6.91 10*3/MM3 (ref 1.7–7)
NEUTROPHILS NFR BLD AUTO: 62.8 % (ref 42.7–76)
NRBC BLD AUTO-RTO: 0.2 /100 WBC (ref 0–0.2)
NT-PROBNP SERPL-MCNC: 269.3 PG/ML (ref 0–900)
PLATELET # BLD AUTO: 219 10*3/MM3 (ref 140–450)
PMV BLD AUTO: 11.3 FL (ref 6–12)
POTASSIUM SERPL-SCNC: 4.7 MMOL/L (ref 3.5–5.2)
PROT SERPL-MCNC: 6.3 G/DL (ref 6–8.5)
RBC # BLD AUTO: 4.72 10*6/MM3 (ref 3.77–5.28)
SODIUM SERPL-SCNC: 136 MMOL/L (ref 136–145)
TROPONIN T NUMERIC DELTA: NORMAL
TROPONIN T SERPL HS-MCNC: <6 NG/L
WBC NRBC COR # BLD AUTO: 11.01 10*3/MM3 (ref 3.4–10.8)
WHOLE BLOOD HOLD COAG: NORMAL
WHOLE BLOOD HOLD SPECIMEN: NORMAL

## 2025-06-11 PROCEDURE — 83690 ASSAY OF LIPASE: CPT | Performed by: EMERGENCY MEDICINE

## 2025-06-11 PROCEDURE — 83880 ASSAY OF NATRIURETIC PEPTIDE: CPT | Performed by: EMERGENCY MEDICINE

## 2025-06-11 PROCEDURE — 96374 THER/PROPH/DIAG INJ IV PUSH: CPT

## 2025-06-11 PROCEDURE — 93005 ELECTROCARDIOGRAM TRACING: CPT | Performed by: EMERGENCY MEDICINE

## 2025-06-11 PROCEDURE — 85025 COMPLETE CBC W/AUTO DIFF WBC: CPT | Performed by: EMERGENCY MEDICINE

## 2025-06-11 PROCEDURE — 25010000002 HYDROMORPHONE 1 MG/ML SOLUTION: Performed by: EMERGENCY MEDICINE

## 2025-06-11 PROCEDURE — 80053 COMPREHEN METABOLIC PANEL: CPT | Performed by: EMERGENCY MEDICINE

## 2025-06-11 PROCEDURE — 93005 ELECTROCARDIOGRAM TRACING: CPT

## 2025-06-11 PROCEDURE — 25510000001 IOPAMIDOL PER 1 ML: Performed by: EMERGENCY MEDICINE

## 2025-06-11 PROCEDURE — 71045 X-RAY EXAM CHEST 1 VIEW: CPT

## 2025-06-11 PROCEDURE — 36415 COLL VENOUS BLD VENIPUNCTURE: CPT

## 2025-06-11 PROCEDURE — 84484 ASSAY OF TROPONIN QUANT: CPT | Performed by: EMERGENCY MEDICINE

## 2025-06-11 PROCEDURE — 71275 CT ANGIOGRAPHY CHEST: CPT

## 2025-06-11 PROCEDURE — 82565 ASSAY OF CREATININE: CPT

## 2025-06-11 RX ORDER — ASPIRIN 81 MG/1
324 TABLET, CHEWABLE ORAL ONCE
Status: COMPLETED | OUTPATIENT
Start: 2025-06-11 | End: 2025-06-11

## 2025-06-11 RX ORDER — METHOCARBAMOL 500 MG/1
500 TABLET, FILM COATED ORAL 3 TIMES DAILY PRN
Qty: 21 TABLET | Refills: 0 | Status: SHIPPED | OUTPATIENT
Start: 2025-06-11

## 2025-06-11 RX ORDER — METHOCARBAMOL 750 MG/1
750 TABLET, FILM COATED ORAL ONCE
Status: COMPLETED | OUTPATIENT
Start: 2025-06-11 | End: 2025-06-11

## 2025-06-11 RX ORDER — SODIUM CHLORIDE 0.9 % (FLUSH) 0.9 %
10 SYRINGE (ML) INJECTION AS NEEDED
Status: DISCONTINUED | OUTPATIENT
Start: 2025-06-11 | End: 2025-06-11 | Stop reason: HOSPADM

## 2025-06-11 RX ORDER — IOPAMIDOL 755 MG/ML
100 INJECTION, SOLUTION INTRAVASCULAR
Status: COMPLETED | OUTPATIENT
Start: 2025-06-11 | End: 2025-06-11

## 2025-06-11 RX ADMIN — ASPIRIN 81 MG 324 MG: 81 TABLET ORAL at 01:33

## 2025-06-11 RX ADMIN — METHOCARBAMOL 750 MG: 750 TABLET ORAL at 04:56

## 2025-06-11 RX ADMIN — IOPAMIDOL 75 ML: 755 INJECTION, SOLUTION INTRAVENOUS at 02:28

## 2025-06-11 RX ADMIN — HYDROMORPHONE HYDROCHLORIDE 1 MG: 1 INJECTION, SOLUTION INTRAMUSCULAR; INTRAVENOUS; SUBCUTANEOUS at 01:34

## 2025-06-11 NOTE — DISCHARGE INSTRUCTIONS
"ER assessment revealed normal cardiac workup with normal EKG and 2 normal cardiac troponins.  CBC and chemistries were also within normal limits, including normal kidney function.  CT angiogram of the chest was performed to assess the aorta, and there was no evidence of aortic dissection or aneurysm.  Lungs revealed no pneumonia or blood clot or other acute process.  ER workup was reassuring.  We will refer patient to her chest pain clinic for further assessment.  Rx for diclofenac and Robaxin as needed for \"spasms\".  Recommend close PCP follow-up for recheck.  Strongly recommend tobacco cessation.  Return to the ER if worsening symptoms.  "

## 2025-06-11 NOTE — ED NOTES
Thank you for letting us care for you in your recent visit to our urgent care center. We would love to hear about your experience with us. Was this the first time you have visited our location?    We’re always looking for ways to make our patients’ experiences even better. Do you have any recommendations on ways we may improve?     I appreciate you taking the time to respond. Please be on the lookout for a survey about your recent visit from [employer] via text or email. We would greatly appreciate if you could fill that out and turn it back in. We want your voice to be heard and we value your feedback.   Thank you for choosing [facility] for your healthcare needs.

## 2025-06-11 NOTE — ED PROVIDER NOTES
"Subjective   History of Present Illness  This is a 54-year-old female that presents to the ER with recurrent \"spasms\" all across the chest with radiation straight through to the upper back.  Patient says pain is radiating to the left arm and it causes her to be short of breath.  She does have chronic shortness of breath with history of COPD and continued tobacco dependence at less than half pack of cigarettes per day.  Patient denies any fever, chills, rhinorrhea, or nasal congestion.  She has a chronic cough and denies any expectoration recently.  Patient says these spasms are coming and going, and they are causing her blood pressure to be elevated.  She was just treated for COPD exacerbation with doxycycline and oral steroids at [facility].  Patient denies any pedal edema to lower extremities.  She denies any near fall or twisting injury or repetitive straining or lifting that could have caused muscle spasms\".  Patient also has past medical history of benign brain tumor with removal, history of Guillain-Barré, history of stroke, PTSD, tobacco dependence, asthma, COPD, hypertension, migraine headaches, small fiber neuropathy, stage III chronic kidney disease, history of seizures, and GERD.  Patient    History provided by:  Patient  Chest Pain  Pain location:  L chest and R chest  Pain quality comment:  \"Spasms\"  Pain radiates to:  Upper back (Pain radiates straight through to upper back)  Duration:  2 days  Timing:  Intermittent  Progression:  Worsening  Chronicity:  New  Context: not breathing, not movement and not raising an arm    Context comment:  2 day h/o \"spasms\" all across chest with radiation straight through to upper back and SOA. Pain to left arm. Pain causes SOA.  Relieved by:  Nothing  Worsened by:  Nothing  Ineffective treatments: Pt taking oral steroids and Doxycycline for chronic bronchitis per [facility].  Associated symptoms: back pain (mid-upper back, \"spasms\"), cough and shortness of breath    Associated " "symptoms: no abdominal pain, no altered mental status, no anorexia, no anxiety, no dizziness, no fatigue, no fever, no headache, no heartburn, no lower extremity edema, no nausea, no near-syncope, no numbness, no orthopnea, no palpitations, no PND, no syncope, no vomiting and no weakness    Risk factors: smoking        Review of Systems   Constitutional: Negative.  Negative for activity change, appetite change, fatigue and fever.   HENT: Negative.  Negative for congestion, ear pain, postnasal drip, rhinorrhea, sinus pressure, sinus pain, sneezing and sore throat.    Respiratory:  Positive for cough and shortness of breath. Negative for wheezing.         Recent dx of chronic bronchitis with underlying COPD. NP cough. Smokes <1/2ppd.  Recent exacerbation of COPD with treatment of doxycycline and oral steroids.   Cardiovascular:  Positive for chest pain (\"spasms\" all across anterior chest with radiation straight through to upper back and left arm). Negative for palpitations, orthopnea, leg swelling, syncope, PND and near-syncope.   Gastrointestinal: Negative.  Negative for abdominal pain, anorexia, constipation, diarrhea, heartburn, nausea and vomiting.   Genitourinary: Negative.  Negative for dysuria, flank pain, frequency and urgency.   Musculoskeletal:  Positive for back pain (mid-upper back, \"spasms\") and myalgias (\"spasms\" to chest and back.).   Neurological: Negative.  Negative for dizziness, syncope, weakness, numbness and headaches.        History of CVA and benign brain tumor removed.   Psychiatric/Behavioral:  The patient is nervous/anxious (anxious secondary to pain from \"spasms\").    All other systems reviewed and are negative.      Past Medical History:   Diagnosis Date    Abnormal Pap smear of cervix     Allergic rhinitis     Asthma     Atypical chest pain     Brain tumor     Status post brain tumor resection in 1989.    Candidiasis of mouth     Cervical high risk HPV (human papillomavirus) test positive " 09/01/2020    CKD (chronic kidney disease), stage III     Colon polyps 09/09/2020    sessile and tubular adenoma    Conversion disorder     COPD (chronic obstructive pulmonary disease)     COVID-19     Elevated liver enzymes     Former smoker 07/05/2023    GERD (gastroesophageal reflux disease)     H/O Helicobacter infection      Status post EGD 9/4/2012, pathology revealed H. pylori infection.    Headache     migraines    History of Guillain-[place] syndrome due to influenza immunization     Neurology evaluation thought it was numbness from carpal tunnel and not Guillain-[place]    Hyperlipidemia     Hypertension     Influenza     Internal hemorrhoids     Low grade squamous intraepith lesion on cytologic smear cervix (lgsil) 09/01/2020    Median neuropathy     Migraine     PTSD (post-traumatic stress disorder)     Seizures     2 seizures after surgery    Small fiber neuropathy     Stroke     Tinea corporis        Allergies   Allergen Reactions    Influenza Vaccines Other (See Comments)     Got GB syndrome    Propofol Other (See Comments)     Propofol infusion reaction, weakness, tremors, paralysis    Codeine Hives       Past Surgical History:   Procedure Laterality Date    BRAIN SURGERY      status post brain tumor resection    CARDIAC CATHETERIZATION N/A 12/27/2023    Procedure: Left Heart Cath;  Surgeon: Ryder Delarosa III, MD;  Location: [facility] CATH INVASIVE LOC[facility];  Service: Cardiovascular;  Laterality: N/A;    COLONOSCOPY      polyp removal    LIVER BIOPSY      UPPER GASTROINTESTINAL ENDOSCOPY      Status post EGD 9/4/2012, pathology revealed H. pylori infection.       Family History   Adopted: Yes   Problem Relation Age of Onset    Lung disease Mother     Heart disease Mother     Stroke Mother     Throat cancer Mother     Uterine cancer Mother     Ovarian cancer Mother     Prostate cancer Father     No Known Problems Sister     No Known Problems Brother     Breast cancer Other     Diabetes Other     Colon cancer  "Other        Social History     Socioeconomic History    Marital status: Legally    Tobacco Use    Smoking status: Every Day     Current packs/day: 0.00     Average packs/day: 0.5 packs/day for 41.7 years (20.9 ttl pk-yrs)     Types: Cigarettes     Start date: 1981     Last attempt to quit: 2022     Years since quittin.7     Passive exposure: Current    Smokeless tobacco: Never    Tobacco comments:     quit with chantix in past   Vaping Use    Vaping status: Never Used   Substance and Sexual Activity    Alcohol use: No    Drug use: No    Sexual activity: Yes     Partners: Male     Birth control/protection: Post-menopausal           Objective   Physical Exam  Vitals and nursing note reviewed.   Constitutional:       General: She is not in acute distress.     Appearance: Normal appearance. She is not ill-appearing, toxic-appearing or diaphoretic.      Comments: No acute distress.  Patient appears uncomfortable secondary to \"spasms\" to the chest and upper back.  Strong tobacco odor noted.   HENT:      Head: Normocephalic and atraumatic.      Nose: Nose normal. No congestion or rhinorrhea.      Right Sinus: No maxillary sinus tenderness or frontal sinus tenderness.      Left Sinus: No maxillary sinus tenderness or frontal sinus tenderness.      Comments: No rhinorrhea or nasal congestion     Mouth/Throat:      Mouth: Mucous membranes are moist.      Pharynx: Oropharynx is clear. No pharyngeal swelling, oropharyngeal exudate, posterior oropharyngeal erythema or uvula swelling.      Comments: Oral mucous membranes are still moist.  Posterior pharynx is not erythematous.  Eyes:      Extraocular Movements: Extraocular movements intact.      Conjunctiva/sclera: Conjunctivae normal.      Pupils: Pupils are equal, round, and reactive to light.   Cardiovascular:      Rate and Rhythm: Normal rate and regular rhythm. No extrasystoles are present.     Pulses: Normal pulses.      Heart sounds: Normal heart " sounds. No murmur heard.     Comments: Regular rate and rhythm.  No tachycardia or ectopy.  No pedal edema to lower extremities  Pulmonary:      Effort: Pulmonary effort is normal. No tachypnea, accessory muscle usage or retractions.      Breath sounds: Wheezing present. No decreased breath sounds, rhonchi or rales.      Comments: Regular respiratory effort.  No retractions or accessory muscle use.  Good air exchange to bilateral lung fields.  Bilateral expiratory wheezes noted.  Chest:      Chest wall: No deformity, swelling, tenderness or crepitus.      Comments: No particular chest wall tenderness to palpation.  No skin lesions or rash.  No crepitus or palpable rib fracture.  Abdominal:      General: Bowel sounds are normal. There is no distension.      Palpations: Abdomen is soft.      Tenderness: There is no abdominal tenderness. There is no right CVA tenderness, left CVA tenderness, guarding or rebound.      Comments: Abdomen soft and nontender.  Active bowel sounds in all 4 quadrants.   Musculoskeletal:         General: Normal range of motion.      Cervical back: Normal range of motion and neck supple.      Thoracic back: No swelling, edema, deformity, signs of trauma, lacerations, spasms, tenderness or bony tenderness. Normal range of motion. No scoliosis.      Comments: I do not appreciate any tenderness to the thoracic myofascia bilaterally.  No muscle tightness or spasms.  No bruising or sign of injury and no skin lesions or rash.   Skin:     General: Skin is warm and dry.      Findings: No bruising, ecchymosis, signs of injury, lesion or rash.      Comments: No skin changes to the trunk, either chest wall or upper back   Neurological:      General: No focal deficit present.      Mental Status: She is alert and oriented to person, place, and time.      Cranial Nerves: Cranial nerves 2-12 are intact.      Sensory: Sensation is intact.      Motor: Motor function is intact.      Coordination: Coordination is  "intact.      Gait: Gait is intact.      Comments: Alert and oriented x 3.  Neuro intact and nonfocal   Psychiatric:         Mood and Affect: Affect normal. Mood is anxious.         Speech: Speech normal.         Thought Content: Thought content normal.         Cognition and Memory: Cognition and memory normal.         Judgment: Judgment normal.      Comments: Anxious and uncomfortable secondary to chest and upper back \"spasms\"         Procedures           ED Course  ED Course as of 06/11/25 0423   Wed Jun 11, 2025   0400 Creatinine: 0.82 [LD]   0414 Patient appears quite uncomfortable on initial assessment due to \"spasms\" to the chest and upper back.  Blood pressure was elevated at 199/126, most likely pain related.  O2 sats were stable on room air throughout the ER course.  I personally interpreted EKG which showed sinus rhythm.  There was no acute ST-T wave changes consistent with ischemia.  CBC and chemistries were within normal limits.  Lipase was 51 and high-sensitivity troponins x 2 sets were less than 6.  BNP was 269.  Lab contacted me due to blood samples being very lipemic. The instruments were having trouble spending the blood down.  We proceeded with CT angiogram of the chest with contrast to assess the aorta due to significant pain to the chest radiating straight through to the upper back.  CTA showed no acute cardiopulmonary abnormality.  No evidence of aortic aneurysm or dissection.  Blood pressure improved after patient was given IV Dilaudid.  We also gave full-strength aspirin 324 mg by mouth.  Last left heart cath was 12/27/2023 per Dr. Delarosa, cardiology, which revealed mild nonobstructive CAD.  Normal ejection fraction.  Aortic stenosis.  Cardiologist recommended medical therapy.  We will refer patient closely to our chest pain clinic for further assessment.  Strongly recommend discontinuation of smoking.  Rx for Robaxin 500 mg by mouth 3 times daily as needed muscle spasms and pain.  We also will " prescribe diclofenac 50 mg by mouth 3 times daily with meals as needed for pain/inflammation.  Return to the ER if worsening symptoms.  Upon reassessment, patient feeling much better and expresses readiness for discharge. [FC]   0418 Heart score is 3. [FC]   0422 SMOKING CESSATION COUNSELING:  I independently performed smoking cessation counseling with the patient for a total of 5 minutes.  I discussed the risks associated with smoking, including increased risk of chronic lung disease, cardiovascular disease and cancer.  I offered to provide resources related to assistance with smoking cessation and also offered to prescribe nicotine replacement therapy, including nicotine gum and patches.  The patient is not currently interested in quitting at this time.  I am providing discharge instructions related to smoking cessation as well as information on how to obtain treatment should they decide.  [FC]      ED Course User Index  [FC] Siena Gan PA-C  [LD] Korin Vega MD                  HEART Score: 3   Shared Decision Making  I discussed the findings with the patient/patient representative who is in agreement with the treatment plan and the final disposition.  Risks and benefits of discharge and/or observation/admission were discussed: Yes                        HEART Score for Major Cardiac Events - MDCalc  Calculated on Jun 11 2025 4:18 AM  3 points -> Low Score (0-3 points) Risk of MACE of 0.9-1.7%.         Recent Results (from the past 24 hours)   ECG 12 Lead QT Measurement    Collection Time: 06/11/25 12:06 AM   Result Value Ref Range    QT Interval 430 ms    QTC Interval 460 ms   BNP    Collection Time: 06/11/25  1:12 AM    Specimen: Blood   Result Value Ref Range    proBNP 269.3 0.0 - 900.0 pg/mL   Green Top (Gel)    Collection Time: 06/11/25  1:12 AM   Result Value Ref Range    Extra Tube Hold for add-ons.    Lavender Top    Collection Time: 06/11/25  1:12 AM   Result Value Ref Range    Extra Tube hold  for add-on    Gold Top - SST    Collection Time: 06/11/25  1:12 AM   Result Value Ref Range    Extra Tube Hold for add-ons.    Gray Top    Collection Time: 06/11/25  1:12 AM   Result Value Ref Range    Extra Tube Hold for add-ons.    Light Blue Top    Collection Time: 06/11/25  1:12 AM   Result Value Ref Range    Extra Tube Hold for add-ons.    CBC Auto Differential    Collection Time: 06/11/25  1:12 AM    Specimen: Blood   Result Value Ref Range    WBC 11.01 (H) 3.40 - 10.80 10*3/mm3    RBC 4.72 3.77 - 5.28 10*6/mm3    Hemoglobin 14.9 12.0 - 15.9 g/dL    Hematocrit 40.8 34.0 - 46.6 %    MCV 86.4 79.0 - 97.0 fL    MCH 31.6 26.6 - 33.0 pg    MCHC 36.5 (H) 31.5 - 35.7 g/dL    RDW 13.0 12.3 - 15.4 %    RDW-SD 40.9 37.0 - 54.0 fl    MPV 11.3 6.0 - 12.0 fL    Platelets 219 140 - 450 10*3/mm3    Neutrophil % 62.8 42.7 - 76.0 %    Lymphocyte % 29.5 19.6 - 45.3 %    Monocyte % 6.4 5.0 - 12.0 %    Eosinophil % 0.3 0.3 - 6.2 %    Basophil % 0.2 0.0 - 1.5 %    Immature Grans % 0.8 (H) 0.0 - 0.5 %    Neutrophils, Absolute 6.91 1.70 - 7.00 10*3/mm3    Lymphocytes, Absolute 3.25 (H) 0.70 - 3.10 10*3/mm3    Monocytes, Absolute 0.71 0.10 - 0.90 10*3/mm3    Eosinophils, Absolute 0.03 0.00 - 0.40 10*3/mm3    Basophils, Absolute 0.02 0.00 - 0.20 10*3/mm3    Immature Grans, Absolute 0.09 (H) 0.00 - 0.05 10*3/mm3    nRBC 0.2 0.0 - 0.2 /100 WBC   High Sensitivity Troponin T    Collection Time: 06/11/25  2:07 AM    Specimen: Blood   Result Value Ref Range    HS Troponin T <6 <14 ng/L   Comprehensive Metabolic Panel    Collection Time: 06/11/25  2:07 AM    Specimen: Blood   Result Value Ref Range    Glucose 134 (H) 65 - 99 mg/dL    BUN 17.5 6.0 - 20.0 mg/dL    Creatinine 0.82 0.57 - 1.00 mg/dL    Sodium 136 136 - 145 mmol/L    Potassium 4.7 3.5 - 5.2 mmol/L    Chloride 102 98 - 107 mmol/L    CO2 24.0 22.0 - 29.0 mmol/L    Calcium 8.5 (L) 8.6 - 10.5 mg/dL    Total Protein 6.3 6.0 - 8.5 g/dL    Albumin 3.8 3.5 - 5.2 g/dL    ALT (SGPT) <5 1 -  33 U/L    AST (SGOT) 25 1 - 32 U/L    Alkaline Phosphatase 87 39 - 117 U/L    Total Bilirubin 0.2 0.0 - 1.2 mg/dL    Globulin 2.5 gm/dL    A/G Ratio 1.5 g/dL    BUN/Creatinine Ratio 21.3 7.0 - 25.0    Anion Gap 10.0 5.0 - 15.0 mmol/L    eGFR 85.7 >60.0 mL/min/1.73   Lipase    Collection Time: 06/11/25  2:07 AM    Specimen: Blood   Result Value Ref Range    Lipase 51 13 - 60 U/L   POC Creatinine    Collection Time: 06/11/25  2:09 AM    Specimen: Blood   Result Value Ref Range    Creatinine 0.90 0.60 - 1.30 mg/dL   High Sensitivity Troponin T 1Hr    Collection Time: 06/11/25  3:15 AM    Specimen: Blood   Result Value Ref Range    HS Troponin T <6 <14 ng/L    Troponin T Numeric Delta       Note: In addition to lab results from this visit, the labs listed above may include labs taken at another facility or during a different encounter within the last 24 hours. Please correlate lab times with ED admission and discharge times for further clarification of the services performed during this visit.    CT Angiogram Chest   Final Result   Impression:   1.No acute cardiopulmonary abnormality. No evidence of aortic aneurysm or dissection.   2.Mild coronary artery calcification.                  Electronically Signed: Ned Forrester MD     6/11/2025 2:39 AM EDT     Workstation ID: [id number]      XR Chest 1 View   Final Result   Impression:   No acute cardiopulmonary abnormality.               Electronically Signed: Ned Forrester MD     6/11/2025 1:08 AM EDT     Workstation ID: [id number]        Vitals:    06/11/25 0230 06/11/25 0300 06/11/25 0330 06/11/25 0400   BP: (!) 195/102 180/100 165/82 (!) 187/100   Pulse: 53 50 (!) 48 62   Resp:       Temp:       SpO2: 92% 92% 94% 94%   Weight:       Height:         Medications   sodium chloride 0.9 % flush 10 mL (has no administration in time range)   methocarbamol (ROBAXIN) tablet 750 mg (has no administration in time range)   aspirin chewable tablet 324 mg (324 mg Oral Given 6/11/25  0133)   HYDROmorphone (DILAUDID) injection 1 mg (1 mg Intravenous Given 6/11/25 0134)   iopamidol (ISOVUE-370) 76 % injection 100 mL (75 mL Intravenous Given 6/11/25 0228)     ECG/EMG Results (last 24 hours)       Procedure Component Value Units Date/Time    ECG 12 Lead QT Measurement [[id number]] Collected: 06/11/25 0006     Updated: 06/11/25 0005     QT Interval 430 ms      QTC Interval 460 ms     Narrative:      Test Reason : QT Measurement  Blood Pressure :   */*   mmHG  Vent. Rate :  69 BPM     Atrial Rate :  69 BPM     P-R Int : 112 ms          QRS Dur : 112 ms      QT Int : 430 ms       P-R-T Axes :  87  51  75 degrees    QTcB Int : 460 ms    Normal sinus rhythm  Possible Left atrial enlargement  Borderline ECG  When compared with ECG of 03-Apr-2024 19:30,  T wave inversion no longer evident in Inferior leads  T wave inversion no longer evident in Anterolateral leads  QT has shortened    Referred By:            Confirmed By:           ECG 12 Lead QT Measurement   Preliminary Result   Test Reason : QT Measurement   Blood Pressure :   */*   mmHG   Vent. Rate :  69 BPM     Atrial Rate :  69 BPM      P-R Int : 112 ms          QRS Dur : 112 ms       QT Int : 430 ms       P-R-T Axes :  87  51  75 degrees     QTcB Int : 460 ms      Normal sinus rhythm   Possible Left atrial enlargement   Borderline ECG   When compared with ECG of 03-Apr-2024 19:30,   T wave inversion no longer evident in Inferior leads   T wave inversion no longer evident in Anterolateral leads   QT has shortened      Referred By:            Confirmed By:       ECG 12 Lead QT Measurement    (Results Pending)              Medical Decision Making  Amount and/or Complexity of Data Reviewed  Labs: ordered. Decision-making details documented in ED Course.  Radiology: ordered.  ECG/medicine tests: ordered.    Risk  OTC drugs.  Prescription drug management.        Final diagnoses:   Atypical chest pain   Upper back pain   Elevated blood pressure reading  with diagnosis of hypertension   Myalgia   History of hypertension   History of hyperlipidemia   History of stroke   Tobacco dependence   History of COPD   Small fiber neuropathy   History of Guillain-[place] syndrome       ED Disposition  ED Disposition       ED Disposition   Discharge    Condition   Stable    Comment   --               [facility] CARDIOLOGY  [address]  [address]  [facility] [address] [phone]  Call today  Call today for close [facility]    Coby Ely PA-C  [address]  [place] [address]  [phone]    Schedule an appointment as soon as possible for a visit in 2 days  Close PCP follow-up for recheck    [facility] EMERGENCY DEPARTMENT  [address] [facility] [address] [phone]    If symptoms worsen         Medication List        New Prescriptions      diclofenac 50 MG EC tablet  Commonly known as: VOLTAREN  Take 1 tablet by mouth 3 (Three) Times a Day.     methocarbamol 500 MG tablet  Commonly known as: ROBAXIN  Take 1 tablet by mouth 3 (Three) Times a Day As Needed for Muscle Spasms.               Where to Get Your Medications        These medications were sent to [facility] [id number] - [place], [place] - [facility] [address] AT [facility] - [phone]  - [phone]   [address], [facility] [address]      Phone: [phone]   diclofenac 50 MG EC tablet  methocarbamol 500 MG tablet            Siena Gan PA-C  06/11/25 0423

## 2025-06-14 LAB
QT INTERVAL: 430 MS
QTC INTERVAL: 460 MS

## 2025-06-16 ENCOUNTER — TELEPHONE (OUTPATIENT)
Dept: CARDIOLOGY | Facility: HOSPITAL | Age: 55
End: 2025-06-16
Payer: MEDICARE

## 2025-06-16 NOTE — TELEPHONE ENCOUNTER
Patient was referred to the Chest Pain Clinic by the [facility]. Several attempts have been made to contact the patient with  no success. Letter was mailed to patient to contact the office to schedule.

## 2025-06-28 ENCOUNTER — PATIENT ROUNDING (BHMG ONLY) (OUTPATIENT)
Dept: URGENT CARE | Facility: CLINIC | Age: 55
End: 2025-06-28
Payer: MEDICARE

## (undated) DEVICE — MODEL BT2000 P/N 700287-012KIT CONTENTS: MANIFOLD WITH SALINE AND CONTRAST PORTS, SALINE TUBING WITH SPIKE AND HAND SYRINGE, TRANSDUCER: Brand: BT2000 AUTOMATED MANIFOLD KIT

## (undated) DEVICE — CATH DIAG EXPO .045 FL3.5 5F 100CM

## (undated) DEVICE — ADULT, W/LG. BACK PAD, RADIOTRANSPARENT ELEMENT AND LEAD WIRE COMPATIBLE W/: Brand: DEFIBRILLATION ELECTRODES

## (undated) DEVICE — CATH DIAG EXPO M/ PK 5F FL4/FR4 PIG

## (undated) DEVICE — PK CATH CARD 10

## (undated) DEVICE — GW PERIPH GUIDERIGHT STD/EXCHNG/J/TIP SS 0.035IN 5X260CM

## (undated) DEVICE — ST INF PRI SMRTSTE 20DRP 2VLV 24ML 117

## (undated) DEVICE — DEV COMPR RADL PRELUDESYNCEZ 30ML 26CM

## (undated) DEVICE — CVR PROB ULTRASND/TRANSD W/GEL 7X11IN STRL

## (undated) DEVICE — ST EXT IV SMRTSTE 2VLV FIX M LL 6ML 41

## (undated) DEVICE — INTRO SHEATH PRELUDE EASE HC .025 6F 11CM